# Patient Record
Sex: FEMALE | Race: WHITE | Employment: OTHER | URBAN - METROPOLITAN AREA
[De-identification: names, ages, dates, MRNs, and addresses within clinical notes are randomized per-mention and may not be internally consistent; named-entity substitution may affect disease eponyms.]

---

## 2017-02-08 ENCOUNTER — TRANSFERRED RECORDS (OUTPATIENT)
Dept: HEALTH INFORMATION MANAGEMENT | Facility: CLINIC | Age: 42
End: 2017-02-08

## 2017-02-10 ENCOUNTER — TRANSFERRED RECORDS (OUTPATIENT)
Dept: HEALTH INFORMATION MANAGEMENT | Facility: CLINIC | Age: 42
End: 2017-02-10

## 2017-02-22 ENCOUNTER — OFFICE VISIT (OUTPATIENT)
Dept: NEUROSURGERY | Facility: CLINIC | Age: 42
End: 2017-02-22
Attending: NEUROLOGICAL SURGERY
Payer: MEDICARE

## 2017-02-22 VITALS — WEIGHT: 229.72 LBS | HEIGHT: 65 IN | BODY MASS INDEX: 38.27 KG/M2 | TEMPERATURE: 99 F

## 2017-02-22 DIAGNOSIS — D32.0 BENIGN NEOPLASM OF CEREBRAL MENINGES (H): ICD-10-CM

## 2017-02-22 DIAGNOSIS — D49.6 BRAIN TUMOR (H): Primary | ICD-10-CM

## 2017-02-22 DIAGNOSIS — D32.9 MENINGIOMA (H): Primary | ICD-10-CM

## 2017-02-22 PROCEDURE — 99214 OFFICE O/P EST MOD 30 MIN: CPT | Mod: ZF

## 2017-02-22 RX ORDER — DIPHENHYDRAMINE HCL 25 MG
25 TABLET ORAL EVERY 6 HOURS PRN
Qty: 56 TABLET | Status: CANCELLED | OUTPATIENT
Start: 2017-02-22

## 2017-02-22 RX ORDER — VENLAFAXINE HYDROCHLORIDE 75 MG/1
75 CAPSULE, EXTENDED RELEASE ORAL EVERY MORNING
COMMUNITY

## 2017-02-22 RX ORDER — MEDROXYPROGESTERONE ACETATE 150 MG/ML
150 INJECTION, SUSPENSION INTRAMUSCULAR
COMMUNITY

## 2017-02-22 RX ORDER — ALBUTEROL SULFATE 90 UG/1
2 AEROSOL, METERED RESPIRATORY (INHALATION) 2 TIMES DAILY
COMMUNITY

## 2017-02-22 RX ORDER — GABAPENTIN 100 MG/1
CAPSULE ORAL
COMMUNITY

## 2017-02-22 RX ORDER — METHOCARBAMOL 500 MG/1
TABLET, FILM COATED ORAL 4 TIMES DAILY PRN
Status: ON HOLD | COMMUNITY
End: 2020-01-01

## 2017-02-22 ASSESSMENT — PAIN SCALES - GENERAL: PAINLEVEL: WORST PAIN (10)

## 2017-02-22 NOTE — NURSING NOTE
"Chief Complaint   Patient presents with     Consult     Multiple meningiomas       Initial Temp 99  F (37.2  C) (Oral)  Ht 5' 4.72\" (164.4 cm)  Wt 229 lb 11.5 oz (104.2 kg)  HC 55 cm (21.65\")  BMI 38.55 kg/m2 Estimated body mass index is 38.55 kg/(m^2) as calculated from the following:    Height as of this encounter: 5' 4.72\" (164.4 cm).    Weight as of this encounter: 229 lb 11.5 oz (104.2 kg).  Medication Reconciliation: complete Did take a while to input all meds and allergies.    Lisa Jameson CMA      "

## 2017-02-22 NOTE — PATIENT INSTRUCTIONS
You met with Pediatric Neurosurgery at the [facility]    Dr. Ángel Lau, Dr. Walt Myles, Dr. Ang Bryant,  Lupis Arrington, JEN, Fabi Bautista NP    Pediatric Appointment Scheduling and Call Center [phone]  Sadia Lopez RNCC, [phone]    Clinic Fax Number: [phone]    For Urgent Matters that cannot wait until the next business day, is over a holiday and/or a weekend, please call [phone] and ask to page the Pediatric Neurosurgery Resident on call.

## 2017-02-22 NOTE — MR AVS SNAPSHOT
After Visit Summary   2/22/2017    Chari Coates    MRN: [id number]           Patient Information     Date Of Birth          1975        Visit Information        Provider Department      2/22/2017 2:15 PM Ángel Lau MD Peds Neurosurgery        Today's Diagnoses     Brain tumor (H)    -  1    Benign neoplasm of cerebral meninges (H)           Care Instructions    You met with Pediatric Neurosurgery at the [facility]    Dr. Ángel Lau, Dr. Walt Myles, Dr. Ang Bryant,  Lupis Arrington NP, Fabi Bautista NP    Pediatric Appointment Scheduling and Call Center [phone]  Sadia Lopez CC, [phone]    Clinic Fax Number: [phone]    For Urgent Matters that cannot wait until the next business day, is over a holiday and/or a weekend, please call [phone] and ask to page the Pediatric Neurosurgery Resident on call.          Follow-ups after your visit        Your next 10 appointments already scheduled     Feb 23, 2017 11:00 AM CST   MR HEAD W/O & W CONTRAST ANGIOGRAM with URMR2   [facility], [place], MRI ([facility])    [address] [place] [address] [phone]           Take your medicines as usual, unless your doctor tells you not to. Bring a list of your current medicines to your exam (including vitamins, minerals and over-the-counter drugs).  You will be given intravenous contrast for this exam. To prepare:   The day before your exam, drink extra fluids at least six 8-ounce glasses (unless your doctor tells you to restrict your fluids).   Have a blood test (creatinine test) within 30 days of your exam. Go to your clinic or Diagnostic Imaging Department for this test.  The MRI machine uses a strong magnet. Please wear clothes without metal (snaps, zippers). A sweatsuit works well, or we may give you a hospital gown.  Please remove any body piercings and hair extensions before you  arrive. You will also remove watches, jewelry, hairpins, wallets, dentures, partial dental plates and hearing aids. You may wear contact lenses, and you may be able to wear your rings. We have a safe place to keep your personal items, but it is safer to leave them at home.   **IMPORTANT** THE INSTRUCTIONS BELOW ARE ONLY FOR THOSE PATIENTS WHO HAVE BEEN TOLD THEY WILL RECEIVE SEDATION OR GENERAL ANESTHESIA DURING THEIR MRI PROCEDURE:  IF YOU WILL RECEIVE SEDATION (take medicine to help you relax during your exam):   You must get the medicine from your doctor before you arrive. Bring the medicine to the exam. Do not take it at home.   Arrive one hour early. Bring someone who can take you home after the test. Your medicine will make you sleepy. After the exam, you may not drive, take a bus or take a taxi by yourself.   No eating 8 hours before your exam. You may have clear liquids up until 4 hours before your exam. (Clear liquids include water, clear tea, black coffee and fruit juice without pulp.)  IF YOU WILL RECEIVE ANESTHESIA (be asleep for your exam):   Arrive 1 1/2 hours early. Bring someone who can take you home after the test. You may not drive, take a bus or take a taxi by yourself.   No eating 8 hours before your exam. You may have clear liquids up until 4 hours before your exam. (Clear liquids include water, clear tea, black coffee and fruit juice without pulp.)  Please call the Imaging Department at your exam site with any questions.            Feb 23, 2017 11:45 AM CST   MR LUMBAR SPINE W/O & W CONTRAST with URMR2   [facility], [place], MRI ([facility])    [address] [place] [address] [phone]           Take your medicines as usual, unless your doctor tells you not to. Bring a list of your current medicines to your exam (including vitamins, minerals and over-the-counter drugs).  You will be given intravenous contrast for this exam. To  prepare:   The day before your exam, drink extra fluids at least six 8-ounce glasses (unless your doctor tells you to restrict your fluids).   Have a blood test (creatinine test) within 30 days of your exam. Go to your clinic or Diagnostic Imaging Department for this test.  The MRI machine uses a strong magnet. Please wear clothes without metal (snaps, zippers). A sweatsuit works well, or we may give you a hospital gown.  Please remove any body piercings and hair extensions before you arrive. You will also remove watches, jewelry, hairpins, wallets, dentures, partial dental plates and hearing aids. You may wear contact lenses, and you may be able to wear your rings. We have a safe place to keep your personal items, but it is safer to leave them at home.   **IMPORTANT** THE INSTRUCTIONS BELOW ARE ONLY FOR THOSE PATIENTS WHO HAVE BEEN TOLD THEY WILL RECEIVE SEDATION OR GENERAL ANESTHESIA DURING THEIR MRI PROCEDURE:  IF YOU WILL RECEIVE SEDATION (take medicine to help you relax during your exam):   You must get the medicine from your doctor before you arrive. Bring the medicine to the exam. Do not take it at home.   Arrive one hour early. Bring someone who can take you home after the test. Your medicine will make you sleepy. After the exam, you may not drive, take a bus or take a taxi by yourself.   No eating 8 hours before your exam. You may have clear liquids up until 4 hours before your exam. (Clear liquids include water, clear tea, black coffee and fruit juice without pulp.)  IF YOU WILL RECEIVE ANESTHESIA (be asleep for your exam):   Arrive 1 1/2 hours early. Bring someone who can take you home after the test. You may not drive, take a bus or take a taxi by yourself.   No eating 8 hours before your exam. You may have clear liquids up until 4 hours before your exam. (Clear liquids include water, clear tea, black coffee and fruit juice without pulp.)  Please call the Imaging Department at your exam site with any  questions.            Feb 23, 2017 12:30 PM CST   MR CERVICAL SPINE W/O & W CONTRAST with URMR2   [facility], [place], MRI ([facility])    [facility] [address] [phone]           Take your medicines as usual, unless your doctor tells you not to. Bring a list of your current medicines to your exam (including vitamins, minerals and over-the-counter drugs).  You will be given intravenous contrast for this exam. To prepare:   The day before your exam, drink extra fluids at least six 8-ounce glasses (unless your doctor tells you to restrict your fluids).   Have a blood test (creatinine test) within 30 days of your exam. Go to your clinic or Diagnostic Imaging Department for this test.  The MRI machine uses a strong magnet. Please wear clothes without metal (snaps, zippers). A sweatsuit works well, or we may give you a hospital gown.  Please remove any body piercings and hair extensions before you arrive. You will also remove watches, jewelry, hairpins, wallets, dentures, partial dental plates and hearing aids. You may wear contact lenses, and you may be able to wear your rings. We have a safe place to keep your personal items, but it is safer to leave them at home.   **IMPORTANT** THE INSTRUCTIONS BELOW ARE ONLY FOR THOSE PATIENTS WHO HAVE BEEN TOLD THEY WILL RECEIVE SEDATION OR GENERAL ANESTHESIA DURING THEIR MRI PROCEDURE:  IF YOU WILL RECEIVE SEDATION (take medicine to help you relax during your exam):   You must get the medicine from your doctor before you arrive. Bring the medicine to the exam. Do not take it at home.   Arrive one hour early. Bring someone who can take you home after the test. Your medicine will make you sleepy. After the exam, you may not drive, take a bus or take a taxi by yourself.   No eating 8 hours before your exam. You may have clear liquids up until 4 hours before your exam. (Clear liquids include water, clear tea,  black coffee and fruit juice without pulp.)  IF YOU WILL RECEIVE ANESTHESIA (be asleep for your exam):   Arrive 1 1/2 hours early. Bring someone who can take you home after the test. You may not drive, take a bus or take a taxi by yourself.   No eating 8 hours before your exam. You may have clear liquids up until 4 hours before your exam. (Clear liquids include water, clear tea, black coffee and fruit juice without pulp.)  Please call the Imaging Department at your exam site with any questions.            Feb 23, 2017  2:30 PM CST   MR THORACIC SPINE W/O & W CONTRAST with URMR2   [facility], [place], MRI ([facility] [place] [facility])    [facility] [address] [phone]           Take your medicines as usual, unless your doctor tells you not to. Bring a list of your current medicines to your exam (including vitamins, minerals and over-the-counter drugs).  You will be given intravenous contrast for this exam. To prepare:   The day before your exam, drink extra fluids at least six 8-ounce glasses (unless your doctor tells you to restrict your fluids).   Have a blood test (creatinine test) within 30 days of your exam. Go to your clinic or Diagnostic Imaging Department for this test.  The MRI machine uses a strong magnet. Please wear clothes without metal (snaps, zippers). A sweatsuit works well, or we may give you a hospital gown.  Please remove any body piercings and hair extensions before you arrive. You will also remove watches, jewelry, hairpins, wallets, dentures, partial dental plates and hearing aids. You may wear contact lenses, and you may be able to wear your rings. We have a safe place to keep your personal items, but it is safer to leave them at home.   **IMPORTANT** THE INSTRUCTIONS BELOW ARE ONLY FOR THOSE PATIENTS WHO HAVE BEEN TOLD THEY WILL RECEIVE SEDATION OR GENERAL ANESTHESIA DURING THEIR MRI PROCEDURE:  IF YOU WILL RECEIVE SEDATION (take  medicine to help you relax during your exam):   You must get the medicine from your doctor before you arrive. Bring the medicine to the exam. Do not take it at home.   Arrive one hour early. Bring someone who can take you home after the test. Your medicine will make you sleepy. After the exam, you may not drive, take a bus or take a taxi by yourself.   No eating 8 hours before your exam. You may have clear liquids up until 4 hours before your exam. (Clear liquids include water, clear tea, black coffee and fruit juice without pulp.)  IF YOU WILL RECEIVE ANESTHESIA (be asleep for your exam):   Arrive 1 1/2 hours early. Bring someone who can take you home after the test. You may not drive, take a bus or take a taxi by yourself.   No eating 8 hours before your exam. You may have clear liquids up until 4 hours before your exam. (Clear liquids include water, clear tea, black coffee and fruit juice without pulp.)  Please call the Imaging Department at your exam site with any questions.              Future tests that were ordered for you today     Open Future Orders        Priority Expected Expires Ordered    MRI Lumbar spine with & without gadolinium [KQY047] Routine  2/22/2018 2/22/2017    MRI Cervical spine with & without gadolinium [YZB699] Routine  2/22/2018 2/22/2017    MRI Thoracic spine with & without gadolinium [SEW772] Routine  2/22/2018 2/22/2017    MRI Angiogram head with & without contrast [LKC064] Routine  2/22/2018 2/22/2017    CT Head with & without contrast [VKB640] Routine  2/22/2018 2/22/2017            Who to contact     Please call your clinic at [phone] to:    Ask questions about your health    Make or cancel appointments    Discuss your medicines    Learn about your test results    Speak to your doctor   If you have compliments or concerns about an experience at your clinic, or if you wish to file a complaint, please contact [facility] Patient Relations at [phone] or  "email us at [email or web address]         Additional Information About Your Visit        [employer] Information     [email or web address] is an electronic gateway that provides easy, online access to your medical records. With [email or web address], you can request a clinic appointment, read your test results, renew a prescription or communicate with your care team.     To sign up for [email or web address] visit the website at [email or web address]   You will be asked to enter the access code listed below, as well as some personal information. Please follow the directions to create your username and password.     Your access code is: [email or web address]  Expires: 2017  4:43 PM     Your access code will  in 90 days. If you need help or a new code, please contact your [facility] or call [phone] for assistance.        Care EveryWhere ID     This is your Care EveryWhere ID. This could be used by other organizations to access your [facility] medical records  [id number]        Your Vitals Were     Temperature Height Head Circumference BMI (Body Mass Index)          99  F (37.2  C) (Oral) 1.644 m (5' 4.72\") 55 cm (21.65\") 38.55 kg/m2         Blood Pressure from Last 3 Encounters:   No data found for BP    Weight from Last 3 Encounters:   17 104.2 kg (229 lb 11.5 oz)               Primary Care Provider Office Phone # Fax #    Zack Spanglerford [phone]       [facility] [address] [place] [address]        Thank you!     Thank you for choosing PEDS NEUROSURGERY  for your care. Our goal is always to provide you with excellent care. Hearing back from our patients is one way we can continue to improve our services. Please take a few minutes to complete the written survey that you may receive in the mail after your visit with us. Thank you!             Your Updated Medication List - Protect others around you: Learn how to safely use, store and throw away your medicines at " www.disposemymeds.org.          This list is accurate as of: 2/22/17  4:44 PM.  Always use your most recent med list.                   Brand Name Dispense Instructions for use    albuterol 108 (90 BASE) MCG/ACT Inhaler    PROAIR HFA/PROVENTIL HFA/VENTOLIN HFA     Inhale 2 puffs into the lungs every 6 hours       DEPO-PROVERA 150 MG/ML injection   Generic drug:  medroxyPROGESTERone      Inject 150 mg into the muscle every 3 months       GABAPENTIN PO      Take 300 mg by mouth At Bedtime       LINZESS PO      Take 145 mcg by mouth every morning (before breakfast)       methocarbamol 500 MG tablet    ROBAXIN     Take by mouth 4 times daily as needed for muscle spasms       SYMBICORT IN          venlafaxine 75 MG 24 hr capsule    EFFEXOR-XR     Take by mouth daily       VITAMIN D PO      Take 50,000 Units by mouth twice a week       ZOFRAN PO      Pt. Unaware of strength-- New Med

## 2017-02-22 NOTE — LETTER
2/22/2017      RE: Chari Coates  [address]  [facility] [address]       February 22, 2017      Elías Christian M.D.   [facility]   [address] [place] [address]       RE:  Chari Coates      Dear Dr. Christian:      It was our pleasure to see Chari along with her father and brother at Pediatric Neurosurgery Clinic at the [facility] in [place], [place].      Briefly, Chari is a 41-year-old lady who had a history of ALL status post chemo and radiation at the age of 5-6 years old.  She was doing great until 2 years ago when she started to have headache.  Initially, she was treated for sinus headache.  However, her headache got worse.  Further workup then revealed multiple enhancing extra-axial lesions.  She was then referred to our Neurosurgery Clinic for further evaluation and management.      She has constant headache mostly in the occipital region.  Its intensity and frequency have increased in the last couple of years, associated with nausea, but no vomiting.  She does get intermittent blurry vision.  According to her brother, she has a left visual field deficit and she gets easily fatigued as well.  She also complains of right hand numbness and tingling sensation on and off, which has been going on for 2-3 years.  No symptoms in the left upper extremity.  No symptoms in bilateral lower extremities.  She has no bowel or bladder dysfunction.  No seizure or no stroke in the past.  She has no other complaints at this point.      PAST MEDICAL HISTORY:  GERD, asthma, acute lymphocytic leukemia, depression     PAST SURGICAL HISTORY:  She has a left foot toe surgery, unclear exact nature of the surgery. Laparoscopic Nissen fundoplication.      MEDICATIONS:  She is on Zofran for nausea, Robaxin 500 mg for muscle spasms.  She is on Depo shot, medroxyprogesterone injections every 3 months.  She is on Effexor 75 for depression.  She is on albuterol and Symbicort  inhaler for asthma.  She is on gabapentin for a left foot spasm and pain which she had since the left foot surgery.      SOCIAL HISTORY:  She lives alone.  She has a lot of family support - her brother and father.  She lives in [place], [place].      FAMILY HISTORY:  Negative for brain tumors.      REVIEW OF SYSTEMS:  Sore throat.  See HPI.  Negative except mentioned above.      PHYSICAL EXAMINATION:  She is a pleasant lady.  Awake, alert, oriented x3.  Her pupils are equal, round, reactive to light.  Extraocular movements intact.  Her visual field testing shows that she has a full visual field on the left eye; however, on the right eye, she does have a left-sided visual field deficit.  Face is asymmetric with left-side slightly down, however, she was able to give a symmetric smile and was able to raise eyebrows on both sides.  Facial sensation is intact to light touch bilaterally.  Tongue midline.  No drift.  Motor strength is 5/5 bilateral upper and lower extremities.  Sensation is intact to light touch.  Reflexes +1 biceps, brachioradialis, patellar bilaterally and symmetric.  No clonus bilaterally.  Downgoing toes bilaterally.      Brain MRI from 02/2017 shows multiple extra-axial enhancing lesions.  Biggest one in the right occipital area, along the right side of the tentorium and the falx, pushing into nearby brain parenchymal tissue.     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      PLAN:  We have spent significant time with her and her family discussing possible surgical interventions.  All of their questions have been answered.  The family would like to proceed with the surgery - resection of the large lesion in the right parietal, occipital area.  Prior to the surgery, we would like to get MRA and MRV to evaluate the vasculature around the tumor.  Unfortunately, patient cannot get CT angiogram due to her allergy to IV dye.  We also want to get a formal visual field testing  before the surgery.    We will get MRI spine with and without contrast, given that she has radiation treatment to her spine in the past.      Thank you for involving us in the care of this patient.        cc:  Zack Nicholas M.D.         [address]  [address]         ÁNGEL MORALES MD       As dictated by VLAD HOWARD MD            D: 2017 17:04   T: 2017 08:13   MT: SAI      Name:     CHARI VERDUGO   MRN:      [id number]        Account:      [id number]   :      1975           Service Date: 2017      Document: [id number]        I, Ángel Morales MD, saw and evaluated Chari Verdugo as part of a shared visit.  I have reviewed and discussed with the resident their history, physical and plan.    I personally reviewed the vital signs, medications, labs and imaging .    My key history or physical exam findings: As described, progressive headaches with blurry vision and evidence of flied cut on confrontational exam. She has multiple radiation induced meningiomas with one large one arising from tentorium/falx extending into the right trigone. There are PCA and tentorial feeders. Other meningiomas much smaller.     Key management decisions made by me: Plan to obtain spine MRI to rule out spine meningiomas as she did receive CS therapy, will obtain formal visual field testing and schedule her for craniotomy and resection of the largest meningioma. We discussed that she may require more surgery in her lifetime and the other tumors will need to be followed. We discussed that this is a risky surgery with risks being possibility of not getting the entire tumor out, and damage to venous drainage which can lead to blood loss and venous stroke with deficits. She understands and wishes to proceed with surgery which we will try to get done soon.     Ángel Morales MD  2017

## 2017-02-23 ENCOUNTER — HOSPITAL ENCOUNTER (OUTPATIENT)
Dept: MRI IMAGING | Facility: CLINIC | Age: 42
End: 2017-02-23
Attending: NEUROLOGICAL SURGERY
Payer: MEDICARE

## 2017-02-23 ENCOUNTER — HOSPITAL ENCOUNTER (OUTPATIENT)
Dept: MRI IMAGING | Facility: CLINIC | Age: 42
Discharge: HOME | End: 2017-02-23
Attending: NEUROLOGICAL SURGERY | Admitting: NEUROLOGICAL SURGERY
Payer: MEDICARE

## 2017-02-23 DIAGNOSIS — D32.0 BENIGN NEOPLASM OF CEREBRAL MENINGES (H): ICD-10-CM

## 2017-02-23 DIAGNOSIS — D49.6 BRAIN TUMOR (H): ICD-10-CM

## 2017-02-23 PROCEDURE — 72156 MRI NECK SPINE W/O & W/DYE: CPT

## 2017-02-23 PROCEDURE — 70553 MRI BRAIN STEM W/O & W/DYE: CPT

## 2017-02-23 PROCEDURE — 25000128 H RX IP 250 OP 636: Performed by: NEUROLOGICAL SURGERY

## 2017-02-23 PROCEDURE — 70546 MR ANGIOGRAPH HEAD W/O&W/DYE: CPT

## 2017-02-23 PROCEDURE — 25500064 ZZH RX 255 OP 636: Performed by: NEUROLOGICAL SURGERY

## 2017-02-23 PROCEDURE — A9585 GADOBUTROL INJECTION: HCPCS | Performed by: NEUROLOGICAL SURGERY

## 2017-02-23 PROCEDURE — 72158 MRI LUMBAR SPINE W/O & W/DYE: CPT

## 2017-02-23 PROCEDURE — 72157 MRI CHEST SPINE W/O & W/DYE: CPT

## 2017-02-23 RX ORDER — GADOBUTROL 604.72 MG/ML
10 INJECTION INTRAVENOUS ONCE
Status: COMPLETED | OUTPATIENT
Start: 2017-02-23 | End: 2017-02-23

## 2017-02-23 RX ADMIN — SODIUM CHLORIDE 100 ML: 9 INJECTION, SOLUTION INTRAVENOUS at 19:31

## 2017-02-23 RX ADMIN — GADOBUTROL 10 ML: 604.72 INJECTION INTRAVENOUS at 19:30

## 2017-02-24 NOTE — PROGRESS NOTES
February 22, 2017      Elías Christian M.D.   [facility]   [address] [place] [address]       RE:  Chari Coates      Dear Dr. Christian:      It was our pleasure to see Chari along with her father and brother at Pediatric Neurosurgery Clinic at the [facility] in [place], [place].      Briefly, Chari is a 41-year-old lady who had a history of ALL status post chemo and radiation at the age of 5-6 years old.  She was doing great until 2 years ago when she started to have headache.  Initially, she was treated for sinus headache.  However, her headache got worse.  Further workup then revealed multiple enhancing extra-axial lesions.  She was then referred to our Neurosurgery Clinic for further evaluation and management.      She has constant headache mostly in the occipital region.  Its intensity and frequency have increased in the last couple of years, associated with nausea, but no vomiting.  She does get intermittent blurry vision.  According to her brother, she has a left visual field deficit and she gets easily fatigued as well.  She also complains of right hand numbness and tingling sensation on and off, which has been going on for 2-3 years.  No symptoms in the left upper extremity.  No symptoms in bilateral lower extremities.  She has no bowel or bladder dysfunction.  No seizure or no stroke in the past.  She has no other complaints at this point.      PAST MEDICAL HISTORY:  GERD, asthma, acute lymphocytic leukemia, depression     PAST SURGICAL HISTORY:  She has a left foot toe surgery, unclear exact nature of the surgery. Laparoscopic Nissen fundoplication.      MEDICATIONS:  She is on Zofran for nausea, Robaxin 500 mg for muscle spasms.  She is on Depo shot, medroxyprogesterone injections every 3 months.  She is on Effexor 75 for depression.  She is on albuterol and Symbicort inhaler for asthma.  She is on gabapentin for a left foot spasm and pain which she  had since the left foot surgery.      SOCIAL HISTORY:  She lives alone.  She has a lot of family support - her brother and father.  She lives in [place], [place].      FAMILY HISTORY:  Negative for brain tumors.      REVIEW OF SYSTEMS:  Sore throat.  See HPI.  Negative except mentioned above.      PHYSICAL EXAMINATION:  She is a pleasant lady.  Awake, alert, oriented x3.  Her pupils are equal, round, reactive to light.  Extraocular movements intact.  Her visual field testing shows that she has a full visual field on the left eye; however, on the right eye, she does have a left-sided visual field deficit.  Face is asymmetric with left-side slightly down, however, she was able to give a symmetric smile and was able to raise eyebrows on both sides.  Facial sensation is intact to light touch bilaterally.  Tongue midline.  No drift.  Motor strength is 5/5 bilateral upper and lower extremities.  Sensation is intact to light touch.  Reflexes +1 biceps, brachioradialis, patellar bilaterally and symmetric.  No clonus bilaterally.  Downgoing toes bilaterally.      Brain MRI from 02/2017 shows multiple extra-axial enhancing lesions.  Biggest one in the right occipital area, along the right side of the tentorium and the falx, pushing into nearby brain parenchymal tissue.     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      PLAN:  We have spent significant time with her and her family discussing possible surgical interventions.  All of their questions have been answered.  The family would like to proceed with the surgery - resection of the large lesion in the right parietal, occipital area.  Prior to the surgery, we would like to get MRA and MRV to evaluate the vasculature around the tumor.  Unfortunately, patient cannot get CT angiogram due to her allergy to IV dye.  We also want to get a formal visual field testing before the surgery.    We will get MRI spine with and without contrast, given that she  has radiation treatment to her spine in the past.      Thank you for involving us in the care of this patient.        cc:  Zack Nicholas M.D.         [address]  [address]         ÁNGEL MORALES MD       As dictated by VLAD HOWARD MD            D: 2017 17:04   T: 2017 08:13   MT: JV      Name:     CHARI VERDUGO   MRN:      [id number]        Account:      [id number]   :      1975           Service Date: 2017      Document: [id number]        I, Ángel Morales MD, saw and evaluated Chari Verdugo as part of a shared visit.  I have reviewed and discussed with the resident their history, physical and plan.    I personally reviewed the vital signs, medications, labs and imaging .    My key history or physical exam findings: As described, progressive headaches with blurry vision and evidence of flied cut on confrontational exam. She has multiple radiation induced meningiomas with one large one arising from tentorium/falx extending into the right trigone. There are PCA and tentorial feeders. Other meningiomas much smaller.     Key management decisions made by me: Plan to obtain spine MRI to rule out spine meningiomas as she did receive CS therapy, will obtain formal visual field testing and schedule her for craniotomy and resection of the largest meningioma. We discussed that she may require more surgery in her lifetime and the other tumors will need to be followed. We discussed that this is a risky surgery with risks being possibility of not getting the entire tumor out, and damage to venous drainage which can lead to blood loss and venous stroke with deficits. She understands and wishes to proceed with surgery which we will try to get done soon.     Ángel Morales MD  2017

## 2017-02-28 DIAGNOSIS — D32.0 CEREBRAL MENINGIOMA (H): Primary | ICD-10-CM

## 2017-03-06 ENCOUNTER — TRANSFERRED RECORDS (OUTPATIENT)
Dept: HEALTH INFORMATION MANAGEMENT | Facility: CLINIC | Age: 42
End: 2017-03-06

## 2017-03-27 ENCOUNTER — ANESTHESIA EVENT (OUTPATIENT)
Dept: SURGERY | Facility: CLINIC | Age: 42
DRG: 025 | End: 2017-03-27
Payer: MEDICARE

## 2017-03-29 ENCOUNTER — OFFICE VISIT (OUTPATIENT)
Dept: SURGERY | Facility: CLINIC | Age: 42
End: 2017-03-29

## 2017-03-29 ENCOUNTER — ALLIED HEALTH/NURSE VISIT (OUTPATIENT)
Dept: SURGERY | Facility: CLINIC | Age: 42
End: 2017-03-29

## 2017-03-29 VITALS
TEMPERATURE: 98.7 F | SYSTOLIC BLOOD PRESSURE: 134 MMHG | HEART RATE: 81 BPM | DIASTOLIC BLOOD PRESSURE: 80 MMHG | BODY MASS INDEX: 39.25 KG/M2 | HEIGHT: 65 IN | OXYGEN SATURATION: 96 % | RESPIRATION RATE: 14 BRPM | WEIGHT: 235.6 LBS

## 2017-03-29 DIAGNOSIS — G93.9 LESION OF LEFT PARIETAL LOBE OF BRAIN: ICD-10-CM

## 2017-03-29 DIAGNOSIS — G93.9 LESION OF LEFT PARIETAL LOBE OF BRAIN: Primary | ICD-10-CM

## 2017-03-29 LAB
APTT PPP: 27 SEC (ref 22–37)
INR PPP: 0.96 (ref 0.86–1.14)

## 2017-03-29 RX ORDER — DIPHENHYDRAMINE HCL 25 MG
CAPSULE ORAL PRN
COMMUNITY

## 2017-03-29 NOTE — ANESTHESIA PREPROCEDURE EVALUATION
Anesthesia Evaluation     . Pt has had prior anesthetic. Type: General and MAC (slow awakening; nausea delayed discharge)    History of anesthetic complications   - PONV        ROS/MED HX    ENT/Pulmonary:     (+)allergic rhinitis, Mild Persistent asthma Last exacerbation: years,Treatment: Inhaled steroids and Inhaler daily,  , . .    Neurologic: Comment: Fibromyalgia  Chronic back and neck pain - neg neurologic ROS     Cardiovascular:         METS/Exercise Tolerance: Comment: Can get wheezy with stairs >4 METS   Hematologic:     (+) History of Transfusion no previous transfusion reaction -      Musculoskeletal:   (+) arthritis (rheumatoid arthritis), , , other musculoskeletal- hx rheumatoid arthritis; no biologics or methotrxate      GI/Hepatic:     (+) GERD       Renal/Genitourinary:  - ROS Renal section negative       Endo:     (+) Obesity, .      Psychiatric:     (+) psychiatric history depression      Infectious Disease:   (+) Other Infectious Disease previous UTI treated with ciprofloxacin      Malignancy:   (+) Malignancy History of Neuro  Neuro CA Active status post.          Other:    (+) H/O Chronic Pain,                   Physical Exam  Normal systems: dental    Airway   Mallampati: III  TM distance: <3 FB  Neck ROM: full    Dental     Cardiovascular   Rhythm and rate: regular and normal      Pulmonary    breath sounds clear to auscultation    Other findings:   LABS scanned in from OSH: CBC and CMP normal  Type and screen sent today    Procedure: Procedure(s):  Stealth Assisted Craniotomy, Meningioma Resection - Wound Class:     HPI: Chari Coates is a 41 year old female with PMHx significant for fibromyalgia, rheumatoid arthritis, GERD, depression, obesity, chronic neck and back pain, and ALL as a child, presents for resection of multiple meningiomas.    PMHx/PSHx:  No past medical history on file.    No past surgical history on file.      No current facility-administered medications on file prior to  "encounter.   Current Outpatient Prescriptions on File Prior to Encounter:  Cholecalciferol (VITAMIN D PO) Take 50,000 Units by mouth twice a week   Ondansetron HCl (ZOFRAN PO) Pt. Unaware of strength-- New Med   methocarbamol (ROBAXIN) 500 MG tablet Take by mouth 4 times daily as needed for muscle spasms   Linaclotide (LINZESS PO) Take 145 mcg by mouth every morning (before breakfast)   medroxyPROGESTERone (DEPO-PROVERA) 150 MG/ML injection Inject 150 mg into the muscle every 3 months   venlafaxine (EFFEXOR-XR) 75 MG 24 hr capsule Take by mouth daily   Budesonide-Formoterol Fumarate (SYMBICORT IN)    albuterol (PROAIR HFA/PROVENTIL HFA/VENTOLIN HFA) 108 (90 BASE) MCG/ACT Inhaler Inhale 2 puffs into the lungs every 6 hours   GABAPENTIN PO Take 300 mg by mouth At Bedtime       Social Hx:   Social History   Substance Use Topics     Smoking status: Never Smoker     Smokeless tobacco: Not on file     Alcohol use Not on file       Allergies:   Allergies   Allergen Reactions     Augmentin      Azithromycin      Bactrim [Sulfamethoxazole W/Trimethoprim]      Cefdinir      Cortisone      Erythromycin      Flonase [Fluticasone]      Iodine      IV     Methylprednisolone      Penicillins      \"Flushed\" Per pt.      Tizanidine          NPO Status: Per ASA Guidelines    Labs:    Blood Bank:  No results found for: ABO, RH, AS  BMP:  No results for input(s): NA, POTASSIUM, CHLORIDE, CO2, BUN, CR, GLC, AUREA in the last 76086 hours.  CBC:   No results for input(s): WBC, RBC, HGB, HCT, MCV, MCH, MCHC, RDW, PLT in the last 56158 hours.  Coags:  No results for input(s): INR, PTT, FIBR in the last 75044 hours.      IMAGING  MR BRAIN W/O & W CONTRAST 2/23/2017 7:31 PM      Findings:  There are multiple T1 isointense, T2 hyperintense avidly enhancing  dural-based masses as follows:     4.5 x 4.2 x 5.0 cm bilobed lesion (series 11 image 13) with dural tail  extending along the right lateral surface of the posterior cerebral  falx and " right-sided leaflet of the tentorium cerebelli exerts mass  effect upon the right parietal occipital lobe and compresses the  posterior horn and trigone of the right lateral ventricle. There is  associated dilation of the temporal horn of the right lateral  ventricle. There is associated vasogenic edema within the right  parieto-occipital lobe subcortical white matter. It is unclear if this  represents one or 2 lesions as there is a thickened septa superiorly   the 2 lobes but the lesion appears more contiguous  inferiorly along the tentorium. There are multiple foci of  intratumoral T1 hyperintensity corresponding to increased attenuation  on CT, compatible with intratumoral hemorrhage.     1.7 x 0.8 x 0.7 cm lesion overlying the anterior left frontal lobe  (series 11 image 18) with mild mass effect upon the left frontal lobe  but no associated vasogenic edema.     0.8 x 0.7 x 0.8 cm lesion overlying the anterior left frontal lobe  adjacent to the falx (series 11 image 16) with minimal mass effect on  the underlying frontal lobe but no associated vasogenic edema.     1.8 x 2.0 x 2.1 cm parasagittal lesion overlying the left parietal  lobe (series 11 image 19) with associated blooming artifact  corresponding to foci of calcification on head CT. Mild mass effect on  the underlying parietal lobe without associated vasogenic edema.     0.6 x 0.7 x 0.5 cm parasagittal lesion along the right lateral aspect  of the posterior cerebral falx (series 11 image 17) with no associated  mass effect or vasogenic edema.     4 mm rounded lesion located in the posterior superior fundus of the  internal auditory canal, without a clear dural tail.     Ascites from the evidence of intratumoral hemorrhage involving the  largest lesion, there is no other evidence of intracranial hemorrhage.  The cerebellar tonsils extend 1 mm inferior to the foramen magnum. No  extra-axial fluid collection. The ventricles are proportionate to  the  cerebral sulci.     No definite abnormality of the skull marrow signal is noted. The major  vascular intracranial flow-voids are present. The visualized portions  of paranasal sinuses, and mastoid air cells are relatively clear. The  orbits are grossly unremarkable.         Impression:  1. There are 5 intracranial dural-based masses as described above with  imaging characteristics compatible with multiple meningiomas. The  largest of these masses arises from the right posterior cerebral falx  and right leaflet of the tentorium cerebelli and demonstrates mass  effect on the right parietal-occipital lobe and is the only lesion  that is associated with associated vasogenic edema and intratumoral  hemorrhage. This lesion also compresses the trigone of the right  lateral ventricle with associated mild dilatation of the temporal horn  of the right lateral ventricle. The other lesions variably demonstrate  mild to no significant mass effect upon the cerebrum.  2. In addition, there is a 4 mm rounded lesion in the left internal  auditory canal. In light of the other multiple meningiomas, this could  represent a small meningioma but by virtue of its location could  represent a schwannoma.     I have personally reviewed the examination and initial interpretation  and I agree with the findings.     CAYLA LEON MD       PAC Discussion and Assessment    ASA Classification: 3  Case is suitable for:   Anesthetic techniques and relevant risks discussed: GA  Invasive monitoring and risk discussed:   Types:   Possibility and Risk of blood transfusion discussed:   NPO instructions given:   Additional anesthetic preparation and risks discussed:   Needs early admission to pre-op area:   Other:     PAC Resident/NP Anesthesia Assessment:  41 year old female for combined optical tacking craniotomy, resection of right parietal lesion and potentially 2 smaller lesions,  with Dr. Ángel Lau, on 3/30/17, in diagnosis and  treatment of  likely meningiomas.   CANCER: Acute lymphocytic leukemia S/P chemoradiation at 5-6 years of age. Recent discovery of brain lesions when sinus CT done for persistent congestion. Some visual changes, no seizures. Ambulation normal.  PULM: Asthma on symbicort and albuterol inhalers. No acute sx. Never hospitalized or on steroids. On exam,   GI: GERD. PUD 2006. S/P Nissen fundoplication. On Zofran for nausea.   Muscles spasms treated with and gabapentin.  No known cardiovascular dx, sx or meds. Exercise tolerance: Treadmill for 45 min at a time,  2 x week. Waves and stairs can be associated with wheezing and she will pretreat with inhaler.   Heavy menses with h/o anemia- on Depoprovera shot x 20 years.   With past GA has had slow awakening which converted a SDS to overnight-? Date. Pt also evaluated by Dr. Jnag. See her recommendations below.        Reviewed and Signed by PAC Mid-Level Provider/Resident  Mid-Level Provider/Resident: Clau Sommers PA-C  Date: 3/29/17  Time: 4:47 pm    Attending Anesthesiologist Anesthesia Assessment:  41 year old for optical tracking craniotomy, excision of presumed meningioma(s). Chart reviewed, patient seen and evaluated; agree with above assessment.    Patient is appropriate for the planned procedure without further workup or medical management change. The final anesthesia plan will be determined by the physician anesthesiologist caring for the patient on the day of surgery.      Reviewed and Signed by PAC Anesthesiologist  Anesthesiologist: PEREZ  Date: 3/29/2017  Time:   Pass/Fail: Pass  Disposition:     PAC Pharmacist Assessment:        Pharmacist:   Date:   Time:      Anesthesia Plan      History & Physical Review  History and physical reviewed and following examination; no interval change.    ASA Status:  3 .        Plan for General, RSI and ETT with Intravenous induction. Maintenance will be Balanced.    PONV prophylaxis:  Ondansetron (or other  5HT-3)  Additional equipment: 2nd IV and Arterial Line      Postoperative Care      Consents            Nesha Nguyen MD  CA-2/PGY-3  3/29/2017  3:04 PM                      .

## 2017-03-29 NOTE — MR AVS SNAPSHOT
After Visit Summary   3/29/2017    Chari Coates    MRN: [id number]           Patient Information     Date Of Birth          1975        Visit Information        Provider Department      3/29/2017 4:00 PM 6, [facility]        Care Instructions    Preparing for Your Surgery      Name:  Chari Coates   MRN:  [id number]   :  1975   Today's Date:  3/29/2017     Arriving for surgery:  Surgery date:  3/30/17  Surgery time:  7:30 am  Arrival time:  5:00 am (CT @ 6:00 am)  Please come to:       [facility] [address]  [address] [place]  [address]    -   parking is available in front of the hospital from 5:15 am to 8:00 pm    -  Stop at the Information Desk in the lobby    -   Inform the information person that you are here for surgery. An escort to 3c will be provided. If you would not like an escort, please proceed to 3C on the 3rd floor. [phone]     What can I eat or drink?  -  You may have solid food or milk products until 8 hours prior to your surgery. (10:00 pm)  -  You may have water, apple juice or 7up/Sprite until 2 hours prior to your surgery. (5:00 am)    Which medicines can I take?  -  Do NOT take aspirin or Ibuprofen      -  Please take these medications the day of surgery: inhalers (bring to hospital), Effexor      How do I prepare myself?  -  Take two showers: one the night before surgery; and one the morning of surgery.         Use Scrubcare or Hibiclens to wash from neck down.  You may use your own shampoo and conditioner. No other hair products.   -  Do NOT use lotion, powder, deodorant, or antiperspirant the day of your surgery.  -  Do NOT wear any makeup, fingernail polish or jewelry.   - Do not bring your own medications to the hospital, except for inhalers and eye drops.-  Bring your ID and insurance card.    Questions or Concerns:  If you have questions or concerns, please call  the  Preoperative Assessment Center, Monday-Friday 7AM-7PM:  [phone]                  AFTER YOUR SURGERY  Breathing exercises   Breathing exercises help you recover faster. Take deep breaths and let the air out slowly. This will:     Help you wake up after surgery.    Help prevent complications like pneumonia.  Preventing complications will help you go home sooner.   Nausea and vomiting   You may feel sick to your stomach after surgery; if so, let your nurse know.    Pain control:  After surgery, you may have pain. Our goal is to help you manage your pain. Pain medicine will help you feel comfortable enough to do activities that will help you heal.  These activities may include breathing exercises, walking and physical therapy.   To help your health care team treat your pain we will ask: 1) If you have pain  2) where it is located 3) describe your pain in your words  Methods of pain control include medications given by mouth, vein or by nerve block for some surgeries.  We may give you a pain control pump that will:  1) Deliver the medicine through a tube placed in your vein  2) Control the amount of medicine you receive  3) Allow you to push a button to deliver a dose of pain medicine  Sequential Compression Device (SCD) or Pneumo Boots:  You may need to wear SCD S on your legs or feet. These are wraps connected to a machine that pumps in air and releases it. The repeated pumping helps prevent blood clots from forming.                   Follow-ups after your visit        Your next 10 appointments already scheduled     Mar 29, 2017  4:50 PM CDT   (Arrive by 4:35 PM)   PAC Anesthesia Consult with  Pac Anesthesiologist   [facility] ([facility])    [address] [place] [address]  [address] [facility] [address] [phone]            Mar 29, 2017  5:00 PM CDT   LAB with  LAB   [facility] Lab ([facility])    [address] [place] [address]  [facility] [id number] [phone]           Patient must bring picture ID.  Patient should be prepared to give a urine specimen  Please do not eat 10-12 hours before your appointment if you are coming in fasting for labs on lipids, cholesterol, or glucose (sugar).  Pregnant women should follow their Care Team instructions. Water with medications is okay. Do not drink coffee or other fluids.   If you have concerns about taking  your medications, please ask at office or if scheduling via [employer], send a message by clicking on Secure Messaging, Message Your Care Team.            Mar 30, 2017  6:00 AM CDT   CT HEAD W/O CONTRAST with UUCT1   [facility], [place] ([facility], [facility])    [address] [place] [address] [phone]           Please bring any scans or X-rays taken at other hospitals, if similar tests were done. Also bring a list of your medicines, including vitamins, minerals and over-the-counter drugs. It is safest to leave personal items at home.  Be sure to tell your doctor:   If you have any allergies.   If there s any chance you are pregnant.   If you are breastfeeding.   If you have any special needs.  You do not need to do anything special to prepare.  Please wear loose clothing, such as a sweat suit or jogging clothes. Avoid snaps, zippers and other metal. We may ask you to undress and put on a hospital gown.            Mar 30, 2017  6:30 AM CDT   (Arrive by 6:00 AM)   MR BRAIN W CONTRAST with UUMR2   [facility], [facility] ([facility], [facility])    [address] [facility] [address] [phone]           Take your medicines as usual, unless your doctor tells you not to. Bring a list of your current medicines to your exam (including vitamins, minerals and over-the-counter drugs).  You will be given intravenous contrast for this exam. To prepare:   The day before your exam, drink  extra fluids at least six 8-ounce glasses (unless your doctor tells you to restrict your fluids).   Have a blood test (creatinine test) within 30 days of your exam. Go to your clinic or Diagnostic Imaging Department for this test.  The MRI machine uses a strong magnet. Please wear clothes without metal (snaps, zippers). A sweatsuit works well, or we may give you a hospital gown.  Please remove any body piercings and hair extensions before you arrive. You will also remove watches, jewelry, hairpins, wallets, dentures, partial dental plates and hearing aids. You may wear contact lenses, and you may be able to wear your rings. We have a safe place to keep your personal items, but it is safer to leave them at home.   **IMPORTANT** THE INSTRUCTIONS BELOW ARE ONLY FOR THOSE PATIENTS WHO HAVE BEEN TOLD THEY WILL RECEIVE SEDATION OR GENERAL ANESTHESIA DURING THEIR MRI PROCEDURE:  IF YOU WILL RECEIVE SEDATION (take medicine to help you relax during your exam):   You must get the medicine from your doctor before you arrive. Bring the medicine to the exam. Do not take it at home.   Arrive one hour early. Bring someone who can take you home after the test. Your medicine will make you sleepy. After the exam, you may not drive, take a bus or take a taxi by yourself.   No eating 8 hours before your exam. You may have clear liquids up until 4 hours before your exam. (Clear liquids include water, clear tea, black coffee and fruit juice without pulp.)  IF YOU WILL RECEIVE ANESTHESIA (be asleep for your exam):   Arrive 1 1/2 hours early. Bring someone who can take you home after the test. You may not drive, take a bus or take a taxi by yourself.   No eating 8 hours before your exam. You may have clear liquids up until 4 hours before your exam. (Clear liquids include water, clear tea, black coffee and fruit juice without pulp.)  Please call the Imaging Department at your exam site with any questions.            Mar 30, 2017   Procedure  "with Ángel Lau MD   [facility], [place], Same Day Surgery (--)    [address]  [place] [address] [phone]              Who to contact     Please call your clinic at [phone] to:    Ask questions about your health    Make or cancel appointments    Discuss your medicines    Learn about your test results    Speak to your doctor   If you have compliments or concerns about an experience at your clinic, or if you wish to file a complaint, please contact [facility] Patient Relations at [phone] or email us at [email or web address]         Additional Information About Your Visit        [employer] Information     [employer] is an electronic gateway that provides easy, online access to your medical records. With [employer], you can request a clinic appointment, read your test results, renew a prescription or communicate with your care team.     To sign up for [employer] visit the website at [email or web address]   You will be asked to enter the access code listed below, as well as some personal information. Please follow the directions to create your username and password.     Your access code is: [email or web address]  Expires: 2017  5:43 PM     Your access code will  in 90 days. If you need help or a new code, please contact your [facility] or call [phone] for assistance.        Care EveryWhere ID     This is your Care EveryWhere ID. This could be used by other organizations to access your [place] medical records  [id number]        Your Vitals Were     Pulse Temperature Respirations Height Pulse Oximetry BMI (Body Mass Index)    81 98.7  F (37.1  C) (Oral) 14 1.651 m (5' 5\") 96% 39.21 kg/m2       Blood Pressure from Last 3 Encounters:   17 134/80    Weight from Last 3 Encounters:   17 106.9 kg (235 lb 9.6 oz)   17 104.2 kg (229 lb 11.5 oz)              Today, you had the following     No orders found for " herminia       Primary Care Provider Office Phone # Fax #    Zack Nicholas [phone]       [facility] [address] [address]        Thank you!     Thank you for choosing [facility] PREOPERATIVE ASSESSMENT [place]  for your care. Our goal is always to provide you with excellent care. Hearing back from our patients is one way we can continue to improve our services. Please take a few minutes to complete the written survey that you may receive in the mail after your visit with us. Thank you!             Your Updated Medication List - Protect others around you: Learn how to safely use, store and throw away your medicines at www.disposemymeds.org.          This list is accurate as of: 3/29/17  4:24 PM.  Always use your most recent med list.                   Brand Name Dispense Instructions for use    albuterol 108 (90 BASE) MCG/ACT Inhaler    PROAIR HFA/PROVENTIL HFA/VENTOLIN HFA     Inhale 2 puffs into the lungs 2 times daily       DEPO-PROVERA 150 MG/ML injection   Generic drug:  medroxyPROGESTERone      Inject 150 mg into the muscle every 3 months       diphenhydrAMINE 25 MG capsule    BENADRYL     Take by mouth as needed       GABAPENTIN PO      Take 300 mg by mouth At Bedtime       LINZESS PO      Take 145 mcg by mouth every morning (before breakfast)       methocarbamol 500 MG tablet    ROBAXIN     Take by mouth 4 times daily as needed for muscle spasms       SYMBICORT IN      Inhale into the lungs 2 times daily       venlafaxine 75 MG 24 hr capsule    EFFEXOR-XR     Take 75 mg by mouth every morning       VITAMIN D PO      Take 50,000 Units by mouth twice a week       ZOFRAN PO      Take by mouth as needed Pt. Unaware of strength-- New Med

## 2017-03-29 NOTE — PATIENT INSTRUCTIONS
Preparing for Your Surgery      Name:  Chari Coates   MRN:  [id number]   :  1975   Today's Date:  3/29/2017     Arriving for surgery:  Surgery date:  3/30/17  Surgery time:  7:30 am  Arrival time:  5:00 am (CT @ 6:00 am)  Please come to:       [facility] [address]  [address] [place]  [address]    -   parking is available in front of the hospital from 5:15 am to 8:00 pm    -  Stop at the Information Desk in the lobby    -   Inform the information person that you are here for surgery. An escort to 3c will be provided. If you would not like an escort, please proceed to 3C on the 3rd floor. [phone]     What can I eat or drink?  -  You may have solid food or milk products until 8 hours prior to your surgery. (10:00 pm)  -  You may have water, apple juice or 7up/Sprite until 2 hours prior to your surgery. (5:00 am)    Which medicines can I take?  -  Do NOT take aspirin or Ibuprofen      -  Please take these medications the day of surgery: inhalers (bring to hospital), Effexor      How do I prepare myself?  -  Take two showers: one the night before surgery; and one the morning of surgery.         Use Scrubcare or Hibiclens to wash from neck down.  You may use your own shampoo and conditioner. No other hair products.   -  Do NOT use lotion, powder, deodorant, or antiperspirant the day of your surgery.  -  Do NOT wear any makeup, fingernail polish or jewelry.   - Do not bring your own medications to the hospital, except for inhalers and eye drops.-  Bring your ID and insurance card.    Questions or Concerns:  If you have questions or concerns, please call the  Preoperative Assessment Center, Monday-Friday 7AM-7PM:  [phone]                  AFTER YOUR SURGERY  Breathing exercises   Breathing exercises help you recover faster. Take deep breaths and let the air out slowly. This will:     Help you wake up after surgery.    Help prevent complications like  pneumonia.  Preventing complications will help you go home sooner.   Nausea and vomiting   You may feel sick to your stomach after surgery; if so, let your nurse know.    Pain control:  After surgery, you may have pain. Our goal is to help you manage your pain. Pain medicine will help you feel comfortable enough to do activities that will help you heal.  These activities may include breathing exercises, walking and physical therapy.   To help your health care team treat your pain we will ask: 1) If you have pain  2) where it is located 3) describe your pain in your words  Methods of pain control include medications given by mouth, vein or by nerve block for some surgeries.  We may give you a pain control pump that will:  1) Deliver the medicine through a tube placed in your vein  2) Control the amount of medicine you receive  3) Allow you to push a button to deliver a dose of pain medicine  Sequential Compression Device (SCD) or Pneumo Boots:  You may need to wear SCD S on your legs or feet. These are wraps connected to a machine that pumps in air and releases it. The repeated pumping helps prevent blood clots from forming.

## 2017-03-30 ENCOUNTER — HOSPITAL ENCOUNTER (OUTPATIENT)
Dept: MRI IMAGING | Facility: CLINIC | Age: 42
DRG: 025 | End: 2017-03-30
Attending: NEUROLOGICAL SURGERY | Admitting: NEUROLOGICAL SURGERY
Payer: MEDICARE

## 2017-03-30 ENCOUNTER — HOSPITAL ENCOUNTER (INPATIENT)
Facility: CLINIC | Age: 42
LOS: 5 days | Discharge: HOME | DRG: 025 | End: 2017-04-04
Attending: NEUROLOGICAL SURGERY | Admitting: NEUROLOGICAL SURGERY
Payer: MEDICARE

## 2017-03-30 ENCOUNTER — HOSPITAL ENCOUNTER (OUTPATIENT)
Dept: CT IMAGING | Facility: CLINIC | Age: 42
DRG: 025 | End: 2017-03-30
Attending: NEUROLOGICAL SURGERY | Admitting: NEUROLOGICAL SURGERY
Payer: MEDICARE

## 2017-03-30 ENCOUNTER — ANESTHESIA (OUTPATIENT)
Dept: SURGERY | Facility: CLINIC | Age: 42
DRG: 025 | End: 2017-03-30
Payer: MEDICARE

## 2017-03-30 DIAGNOSIS — D32.0 CEREBRAL MENINGIOMA (H): ICD-10-CM

## 2017-03-30 DIAGNOSIS — Z98.890 S/P CRANIOTOMY: Primary | ICD-10-CM

## 2017-03-30 PROBLEM — D32.9 MENINGIOMA (H): Status: ACTIVE | Noted: 2017-03-30

## 2017-03-30 LAB
ABO + RH BLD: NORMAL
ABO + RH BLD: NORMAL
ANION GAP SERPL CALCULATED.3IONS-SCNC: 8 MMOL/L (ref 3–14)
APTT PPP: 24 SEC (ref 22–37)
APTT PPP: 25 SEC (ref 22–37)
BASE DEFICIT BLDA-SCNC: 4.4 MMOL/L
BASE DEFICIT BLDA-SCNC: 7.1 MMOL/L
BASE DEFICIT BLDV-SCNC: 5.7 MMOL/L
BASE DEFICIT BLDV-SCNC: 6.5 MMOL/L
BLD GP AB SCN SERPL QL: NORMAL
BLD PROD TYP BPU: NORMAL
BLD UNIT ID BPU: 0
BLOOD BANK CMNT PATIENT-IMP: NORMAL
BLOOD BANK CMNT PATIENT-IMP: NORMAL
BLOOD PRODUCT CODE: NORMAL
BPU ID: NORMAL
BUN SERPL-MCNC: 6 MG/DL (ref 7–30)
CA-I BLD-MCNC: 4.2 MG/DL (ref 4.4–5.2)
CA-I BLD-MCNC: 4.8 MG/DL (ref 4.4–5.2)
CA-I BLD-MCNC: 5.3 MG/DL (ref 4.4–5.2)
CA-I BLD-MCNC: 5.7 MG/DL (ref 4.4–5.2)
CALCIUM SERPL-MCNC: 8.3 MG/DL (ref 8.5–10.1)
CHLORIDE SERPL-SCNC: 114 MMOL/L (ref 94–109)
CO2 SERPL-SCNC: 22 MMOL/L (ref 20–32)
CREAT SERPL-MCNC: 0.68 MG/DL (ref 0.52–1.04)
ERYTHROCYTE [DISTWIDTH] IN BLOOD BY AUTOMATED COUNT: 13.9 % (ref 10–15)
ERYTHROCYTE [DISTWIDTH] IN BLOOD BY AUTOMATED COUNT: 14.1 % (ref 10–15)
ERYTHROCYTE [DISTWIDTH] IN BLOOD BY AUTOMATED COUNT: 14.1 % (ref 10–15)
FIBRINOGEN PPP-MCNC: 137 MG/DL (ref 200–420)
GFR SERPL CREATININE-BSD FRML MDRD: ABNORMAL ML/MIN/1.7M2
GLUCOSE BLD-MCNC: 146 MG/DL (ref 70–99)
GLUCOSE BLD-MCNC: 167 MG/DL (ref 70–99)
GLUCOSE BLD-MCNC: 203 MG/DL (ref 70–99)
GLUCOSE BLD-MCNC: 94 MG/DL (ref 70–99)
GLUCOSE BLDC GLUCOMTR-MCNC: 128 MG/DL (ref 70–99)
GLUCOSE BLDC GLUCOMTR-MCNC: 162 MG/DL (ref 70–99)
GLUCOSE SERPL-MCNC: 164 MG/DL (ref 70–99)
HCG UR QL: NEGATIVE
HCO3 BLD-SCNC: 18 MMOL/L (ref 21–28)
HCO3 BLD-SCNC: 21 MMOL/L (ref 21–28)
HCO3 BLDV-SCNC: 19 MMOL/L (ref 21–28)
HCO3 BLDV-SCNC: 20 MMOL/L (ref 21–28)
HCT VFR BLD AUTO: 26.2 % (ref 35–47)
HCT VFR BLD AUTO: 27.8 % (ref 35–47)
HCT VFR BLD AUTO: 30.5 % (ref 35–47)
HGB BLD-MCNC: 11.3 G/DL (ref 11.7–15.7)
HGB BLD-MCNC: 12.4 G/DL (ref 11.7–15.7)
HGB BLD-MCNC: 8.5 G/DL (ref 11.7–15.7)
HGB BLD-MCNC: 8.5 G/DL (ref 11.7–15.7)
HGB BLD-MCNC: 8.7 G/DL (ref 11.7–15.7)
HGB BLD-MCNC: 9 G/DL (ref 11.7–15.7)
HGB BLD-MCNC: 9.2 G/DL (ref 11.7–15.7)
HGB BLD-MCNC: 9.9 G/DL (ref 11.7–15.7)
INR PPP: 0.95 (ref 0.86–1.14)
INR PPP: 1.43 (ref 0.86–1.14)
MAGNESIUM SERPL-MCNC: 1.7 MG/DL (ref 1.6–2.3)
MCH RBC QN AUTO: 28.8 PG (ref 26.5–33)
MCH RBC QN AUTO: 29.6 PG (ref 26.5–33)
MCH RBC QN AUTO: 29.7 PG (ref 26.5–33)
MCHC RBC AUTO-ENTMCNC: 32.4 G/DL (ref 31.5–36.5)
MCHC RBC AUTO-ENTMCNC: 32.4 G/DL (ref 31.5–36.5)
MCHC RBC AUTO-ENTMCNC: 32.5 G/DL (ref 31.5–36.5)
MCV RBC AUTO: 89 FL (ref 78–100)
MCV RBC AUTO: 91 FL (ref 78–100)
MCV RBC AUTO: 92 FL (ref 78–100)
MRSA DNA SPEC QL NAA+PROBE: NORMAL
NUM BPU REQUESTED: 6
O2/TOTAL GAS SETTING VFR VENT: 46 %
O2/TOTAL GAS SETTING VFR VENT: 46 %
O2/TOTAL GAS SETTING VFR VENT: 47 %
O2/TOTAL GAS SETTING VFR VENT: 49 %
OSMOLALITY SERPL: 311 MMOL/KG (ref 275–295)
PCO2 BLD: 32 MM HG (ref 35–45)
PCO2 BLD: 35 MM HG (ref 35–45)
PCO2 BLDV: 36 MM HG (ref 40–50)
PCO2 BLDV: 42 MM HG (ref 40–50)
PH BLD: 7.36 PH (ref 7.35–7.45)
PH BLD: 7.37 PH (ref 7.35–7.45)
PH BLDV: 7.3 PH (ref 7.32–7.43)
PH BLDV: 7.33 PH (ref 7.32–7.43)
PHOSPHATE SERPL-MCNC: 3.5 MG/DL (ref 2.5–4.5)
PLATELET # BLD AUTO: 131 10E9/L (ref 150–450)
PLATELET # BLD AUTO: 156 10E9/L (ref 150–450)
PLATELET # BLD AUTO: 223 10E9/L (ref 150–450)
PO2 BLD: 167 MM HG (ref 80–105)
PO2 BLD: 84 MM HG (ref 80–105)
PO2 BLDV: 157 MM HG (ref 25–47)
PO2 BLDV: 94 MM HG (ref 25–47)
POTASSIUM BLD-SCNC: 3.7 MMOL/L (ref 3.4–5.3)
POTASSIUM BLD-SCNC: 4.2 MMOL/L (ref 3.4–5.3)
POTASSIUM BLD-SCNC: 4.2 MMOL/L (ref 3.4–5.3)
POTASSIUM BLD-SCNC: 4.5 MMOL/L (ref 3.4–5.3)
POTASSIUM SERPL-SCNC: 3.9 MMOL/L (ref 3.4–5.3)
POTASSIUM SERPL-SCNC: 4.8 MMOL/L (ref 3.4–5.3)
RBC # BLD AUTO: 2.95 10E12/L (ref 3.8–5.2)
RBC # BLD AUTO: 3.03 10E12/L (ref 3.8–5.2)
RBC # BLD AUTO: 3.34 10E12/L (ref 3.8–5.2)
SODIUM BLD-SCNC: 132 MMOL/L (ref 133–144)
SODIUM BLD-SCNC: 137 MMOL/L (ref 133–144)
SODIUM BLD-SCNC: 140 MMOL/L (ref 133–144)
SODIUM BLD-SCNC: 142 MMOL/L (ref 133–144)
SODIUM SERPL-SCNC: 144 MMOL/L (ref 133–144)
SPECIMEN EXP DATE BLD: NORMAL
SPECIMEN SOURCE: NORMAL
TRANSFUSION STATUS PATIENT QL: NORMAL
WBC # BLD AUTO: 10.5 10E9/L (ref 4–11)
WBC # BLD AUTO: 11 10E9/L (ref 4–11)
WBC # BLD AUTO: 13 10E9/L (ref 4–11)

## 2017-03-30 PROCEDURE — A9270 NON-COVERED ITEM OR SERVICE: HCPCS | Mod: GY | Performed by: NEUROLOGICAL SURGERY

## 2017-03-30 PROCEDURE — 25000125 ZZHC RX 250: Performed by: ANESTHESIOLOGY

## 2017-03-30 PROCEDURE — 27211024 ZZHC OR SUPPLY OTHER OPNP: Performed by: NEUROLOGICAL SURGERY

## 2017-03-30 PROCEDURE — 37000009 ZZH ANESTHESIA TECHNICAL FEE, EACH ADDTL 15 MIN: Performed by: NEUROLOGICAL SURGERY

## 2017-03-30 PROCEDURE — A9585 GADOBUTROL INJECTION: HCPCS | Performed by: NEUROLOGICAL SURGERY

## 2017-03-30 PROCEDURE — 83930 ASSAY OF BLOOD OSMOLALITY: CPT | Performed by: NEUROLOGICAL SURGERY

## 2017-03-30 PROCEDURE — 36000076 ZZH SURGERY LEVEL 6 EA 15 ADDTL MIN - UMMC: Performed by: NEUROLOGICAL SURGERY

## 2017-03-30 PROCEDURE — 27810169 ZZH OR IMPLANT GENERAL: Performed by: NEUROLOGICAL SURGERY

## 2017-03-30 PROCEDURE — P9041 ALBUMIN (HUMAN),5%, 50ML: HCPCS

## 2017-03-30 PROCEDURE — 25800025 ZZH RX 258

## 2017-03-30 PROCEDURE — 86901 BLOOD TYPING SEROLOGIC RH(D): CPT | Performed by: NEUROLOGICAL SURGERY

## 2017-03-30 PROCEDURE — 84100 ASSAY OF PHOSPHORUS: CPT | Performed by: NEUROLOGICAL SURGERY

## 2017-03-30 PROCEDURE — 25000128 H RX IP 250 OP 636: Performed by: NEUROLOGICAL SURGERY

## 2017-03-30 PROCEDURE — 85730 THROMBOPLASTIN TIME PARTIAL: CPT | Performed by: NEUROLOGICAL SURGERY

## 2017-03-30 PROCEDURE — 70552 MRI BRAIN STEM W/DYE: CPT

## 2017-03-30 PROCEDURE — 25500064 ZZH RX 255 OP 636: Performed by: NEUROLOGICAL SURGERY

## 2017-03-30 PROCEDURE — 25000125 ZZHC RX 250: Performed by: NEUROLOGICAL SURGERY

## 2017-03-30 PROCEDURE — 85610 PROTHROMBIN TIME: CPT | Performed by: NEUROLOGICAL SURGERY

## 2017-03-30 PROCEDURE — 88331 PATH CONSLTJ SURG 1 BLK 1SPC: CPT | Performed by: NEUROLOGICAL SURGERY

## 2017-03-30 PROCEDURE — 88307 TISSUE EXAM BY PATHOLOGIST: CPT | Performed by: NEUROLOGICAL SURGERY

## 2017-03-30 PROCEDURE — 86850 RBC ANTIBODY SCREEN: CPT | Performed by: NEUROLOGICAL SURGERY

## 2017-03-30 PROCEDURE — 8E09XBG COMPUTER ASSISTED PROCEDURE OF HEAD AND NECK REGION, WITH COMPUTERIZED TOMOGRAPHY: ICD-10-PCS | Performed by: NEUROLOGICAL SURGERY

## 2017-03-30 PROCEDURE — 36415 COLL VENOUS BLD VENIPUNCTURE: CPT | Performed by: NEUROLOGICAL SURGERY

## 2017-03-30 PROCEDURE — 00000146 ZZHCL STATISTIC GLUCOSE BY METER IP

## 2017-03-30 PROCEDURE — 40000275 ZZH STATISTIC RCP TIME EA 10 MIN

## 2017-03-30 PROCEDURE — 25000125 ZZHC RX 250

## 2017-03-30 PROCEDURE — 71000015 ZZH RECOVERY PHASE 1 LEVEL 2 EA ADDTL HR: Performed by: NEUROLOGICAL SURGERY

## 2017-03-30 PROCEDURE — 40000170 ZZH STATISTIC PRE-PROCEDURE ASSESSMENT II: Performed by: NEUROLOGICAL SURGERY

## 2017-03-30 PROCEDURE — P9016 RBC LEUKOCYTES REDUCED: HCPCS | Performed by: PHYSICIAN ASSISTANT

## 2017-03-30 PROCEDURE — 80048 BASIC METABOLIC PNL TOTAL CA: CPT | Performed by: NEUROLOGICAL SURGERY

## 2017-03-30 PROCEDURE — 81025 URINE PREGNANCY TEST: CPT | Performed by: NEUROLOGICAL SURGERY

## 2017-03-30 PROCEDURE — 85384 FIBRINOGEN ACTIVITY: CPT | Performed by: NEUROLOGICAL SURGERY

## 2017-03-30 PROCEDURE — S0077 INJECTION, CLINDAMYCIN PHOSP: HCPCS | Performed by: NEUROLOGICAL SURGERY

## 2017-03-30 PROCEDURE — 25000132 ZZH RX MED GY IP 250 OP 250 PS 637: Mod: GY | Performed by: NEUROLOGICAL SURGERY

## 2017-03-30 PROCEDURE — 00B20ZX EXCISION OF DURA MATER, OPEN APPROACH, DIAGNOSTIC: ICD-10-PCS | Performed by: NEUROLOGICAL SURGERY

## 2017-03-30 PROCEDURE — 00C20ZZ EXTIRPATION OF MATTER FROM DURA MATER, OPEN APPROACH: ICD-10-PCS | Performed by: NEUROLOGICAL SURGERY

## 2017-03-30 PROCEDURE — 82947 ASSAY GLUCOSE BLOOD QUANT: CPT | Performed by: ANESTHESIOLOGY

## 2017-03-30 PROCEDURE — 84295 ASSAY OF SERUM SODIUM: CPT | Performed by: ANESTHESIOLOGY

## 2017-03-30 PROCEDURE — 37000008 ZZH ANESTHESIA TECHNICAL FEE, 1ST 30 MIN: Performed by: NEUROLOGICAL SURGERY

## 2017-03-30 PROCEDURE — 25000565 ZZH ISOFLURANE, EA 15 MIN: Performed by: NEUROLOGICAL SURGERY

## 2017-03-30 PROCEDURE — 40000014 ZZH STATISTIC ARTERIAL MONITORING DAILY

## 2017-03-30 PROCEDURE — 84132 ASSAY OF SERUM POTASSIUM: CPT | Performed by: NEUROLOGICAL SURGERY

## 2017-03-30 PROCEDURE — 85018 HEMOGLOBIN: CPT | Performed by: NEUROLOGICAL SURGERY

## 2017-03-30 PROCEDURE — 85027 COMPLETE CBC AUTOMATED: CPT | Performed by: ANESTHESIOLOGY

## 2017-03-30 PROCEDURE — 27210794 ZZH OR GENERAL SUPPLY STERILE: Performed by: NEUROLOGICAL SURGERY

## 2017-03-30 PROCEDURE — 87641 MR-STAPH DNA AMP PROBE: CPT | Performed by: INTERNAL MEDICINE

## 2017-03-30 PROCEDURE — 82803 BLOOD GASES ANY COMBINATION: CPT | Performed by: ANESTHESIOLOGY

## 2017-03-30 PROCEDURE — 25000128 H RX IP 250 OP 636: Performed by: ANESTHESIOLOGY

## 2017-03-30 PROCEDURE — C1713 ANCHOR/SCREW BN/BN,TIS/BN: HCPCS | Performed by: NEUROLOGICAL SURGERY

## 2017-03-30 PROCEDURE — 27210995 ZZH RX 272: Performed by: NEUROLOGICAL SURGERY

## 2017-03-30 PROCEDURE — 83735 ASSAY OF MAGNESIUM: CPT | Performed by: NEUROLOGICAL SURGERY

## 2017-03-30 PROCEDURE — 70450 CT HEAD/BRAIN W/O DYE: CPT

## 2017-03-30 PROCEDURE — 86900 BLOOD TYPING SEROLOGIC ABO: CPT | Performed by: NEUROLOGICAL SURGERY

## 2017-03-30 PROCEDURE — 85027 COMPLETE CBC AUTOMATED: CPT | Performed by: NEUROLOGICAL SURGERY

## 2017-03-30 PROCEDURE — 25000128 H RX IP 250 OP 636

## 2017-03-30 PROCEDURE — 36000074 ZZH SURGERY LEVEL 6 1ST 30 MIN - UMMC: Performed by: NEUROLOGICAL SURGERY

## 2017-03-30 PROCEDURE — 71000014 ZZH RECOVERY PHASE 1 LEVEL 2 FIRST HR: Performed by: NEUROLOGICAL SURGERY

## 2017-03-30 PROCEDURE — 87640 STAPH A DNA AMP PROBE: CPT | Performed by: INTERNAL MEDICINE

## 2017-03-30 PROCEDURE — 20000004 ZZH R&B ICU UMMC

## 2017-03-30 PROCEDURE — 82330 ASSAY OF CALCIUM: CPT | Performed by: ANESTHESIOLOGY

## 2017-03-30 PROCEDURE — 25000128 H RX IP 250 OP 636: Performed by: NURSE ANESTHETIST, CERTIFIED REGISTERED

## 2017-03-30 PROCEDURE — C9399 UNCLASSIFIED DRUGS OR BIOLOG: HCPCS | Performed by: NURSE ANESTHETIST, CERTIFIED REGISTERED

## 2017-03-30 PROCEDURE — 84132 ASSAY OF SERUM POTASSIUM: CPT | Performed by: ANESTHESIOLOGY

## 2017-03-30 DEVICE — IMP PLATE SYN STR DOG BONE LOW PROFILE 2H TI 421.502
Type: IMPLANTABLE DEVICE | Site: SKULL | Status: NON-FUNCTIONAL
Removed: 2020-12-16

## 2017-03-30 DEVICE — IMP PLATE SYN BOX LOW PROFILE 10X16MM 04H TI 421.521
Type: IMPLANTABLE DEVICE | Site: SKULL | Status: NON-FUNCTIONAL
Removed: 2020-12-16

## 2017-03-30 DEVICE — IMP BUR HOLE COVER 17MM LOW PROFILE TI 421.527
Type: IMPLANTABLE DEVICE | Site: SKULL | Status: NON-FUNCTIONAL
Removed: 2020-12-16

## 2017-03-30 RX ORDER — METOCLOPRAMIDE HYDROCHLORIDE 5 MG/ML
10 INJECTION INTRAMUSCULAR; INTRAVENOUS EVERY 6 HOURS PRN
Status: DISCONTINUED | OUTPATIENT
Start: 2017-03-30 | End: 2017-04-04 | Stop reason: HOSPADM

## 2017-03-30 RX ORDER — POLYETHYLENE GLYCOL 3350 17 G/17G
17 POWDER, FOR SOLUTION ORAL 2 TIMES DAILY
Status: DISCONTINUED | OUTPATIENT
Start: 2017-03-30 | End: 2017-04-04 | Stop reason: HOSPADM

## 2017-03-30 RX ORDER — METHOCARBAMOL 500 MG/1
500 TABLET, FILM COATED ORAL 4 TIMES DAILY
Status: DISCONTINUED | OUTPATIENT
Start: 2017-03-30 | End: 2017-04-04 | Stop reason: HOSPADM

## 2017-03-30 RX ORDER — METOCLOPRAMIDE HYDROCHLORIDE 5 MG/ML
10 INJECTION INTRAMUSCULAR; INTRAVENOUS EVERY 6 HOURS PRN
Status: DISCONTINUED | OUTPATIENT
Start: 2017-03-30 | End: 2017-03-30 | Stop reason: HOSPADM

## 2017-03-30 RX ORDER — NALOXONE HYDROCHLORIDE 0.4 MG/ML
.1-.4 INJECTION, SOLUTION INTRAMUSCULAR; INTRAVENOUS; SUBCUTANEOUS
Status: DISCONTINUED | OUTPATIENT
Start: 2017-03-30 | End: 2017-04-04 | Stop reason: HOSPADM

## 2017-03-30 RX ORDER — HYDROMORPHONE HYDROCHLORIDE 1 MG/ML
.3-.5 INJECTION, SOLUTION INTRAMUSCULAR; INTRAVENOUS; SUBCUTANEOUS
Status: DISCONTINUED | OUTPATIENT
Start: 2017-03-30 | End: 2017-04-01

## 2017-03-30 RX ORDER — HYDRALAZINE HYDROCHLORIDE 20 MG/ML
10-20 INJECTION INTRAMUSCULAR; INTRAVENOUS EVERY 30 MIN PRN
Status: DISCONTINUED | OUTPATIENT
Start: 2017-03-30 | End: 2017-04-04 | Stop reason: HOSPADM

## 2017-03-30 RX ORDER — BUDESONIDE AND FORMOTEROL FUMARATE DIHYDRATE 80; 4.5 UG/1; UG/1
2 AEROSOL RESPIRATORY (INHALATION)
Status: DISCONTINUED | OUTPATIENT
Start: 2017-03-30 | End: 2017-04-04 | Stop reason: HOSPADM

## 2017-03-30 RX ORDER — SODIUM CHLORIDE, SODIUM LACTATE, POTASSIUM CHLORIDE, CALCIUM CHLORIDE 600; 310; 30; 20 MG/100ML; MG/100ML; MG/100ML; MG/100ML
INJECTION, SOLUTION INTRAVENOUS CONTINUOUS PRN
Status: DISCONTINUED | OUTPATIENT
Start: 2017-03-30 | End: 2017-03-30

## 2017-03-30 RX ORDER — AMOXICILLIN 250 MG
2 CAPSULE ORAL 2 TIMES DAILY
Status: DISCONTINUED | OUTPATIENT
Start: 2017-03-30 | End: 2017-04-04 | Stop reason: HOSPADM

## 2017-03-30 RX ORDER — GADOBUTROL 604.72 MG/ML
10 INJECTION INTRAVENOUS ONCE
Status: COMPLETED | OUTPATIENT
Start: 2017-03-30 | End: 2017-03-30

## 2017-03-30 RX ORDER — METOCLOPRAMIDE 10 MG/1
10 TABLET ORAL EVERY 6 HOURS PRN
Status: DISCONTINUED | OUTPATIENT
Start: 2017-03-30 | End: 2017-04-04 | Stop reason: HOSPADM

## 2017-03-30 RX ORDER — VENLAFAXINE HYDROCHLORIDE 75 MG/1
75 TABLET, EXTENDED RELEASE ORAL EVERY MORNING
Status: DISCONTINUED | OUTPATIENT
Start: 2017-03-31 | End: 2017-04-04 | Stop reason: HOSPADM

## 2017-03-30 RX ORDER — ONDANSETRON 4 MG/1
4-8 TABLET, ORALLY DISINTEGRATING ORAL EVERY 6 HOURS PRN
Status: DISCONTINUED | OUTPATIENT
Start: 2017-03-30 | End: 2017-04-04 | Stop reason: HOSPADM

## 2017-03-30 RX ORDER — TRAMADOL HYDROCHLORIDE 50 MG/1
50 TABLET ORAL EVERY 6 HOURS PRN
Status: DISCONTINUED | OUTPATIENT
Start: 2017-03-30 | End: 2017-04-04 | Stop reason: HOSPADM

## 2017-03-30 RX ORDER — SIMETHICONE 80 MG
80 TABLET,CHEWABLE ORAL EVERY 6 HOURS PRN
Status: DISCONTINUED | OUTPATIENT
Start: 2017-03-30 | End: 2017-04-04 | Stop reason: HOSPADM

## 2017-03-30 RX ORDER — LABETALOL HYDROCHLORIDE 5 MG/ML
10-40 INJECTION, SOLUTION INTRAVENOUS EVERY 10 MIN PRN
Status: DISCONTINUED | OUTPATIENT
Start: 2017-03-30 | End: 2017-04-04 | Stop reason: HOSPADM

## 2017-03-30 RX ORDER — CLINDAMYCIN PHOSPHATE 900 MG/50ML
900 INJECTION, SOLUTION INTRAVENOUS SEE ADMIN INSTRUCTIONS
Status: DISCONTINUED | OUTPATIENT
Start: 2017-03-30 | End: 2017-03-30 | Stop reason: HOSPADM

## 2017-03-30 RX ORDER — ONDANSETRON 4 MG/1
4 TABLET, ORALLY DISINTEGRATING ORAL EVERY 30 MIN PRN
Status: DISCONTINUED | OUTPATIENT
Start: 2017-03-30 | End: 2017-03-30 | Stop reason: HOSPADM

## 2017-03-30 RX ORDER — SODIUM CHLORIDE 9 MG/ML
INJECTION, SOLUTION INTRAVENOUS CONTINUOUS
Status: DISCONTINUED | OUTPATIENT
Start: 2017-03-30 | End: 2017-04-02

## 2017-03-30 RX ORDER — GABAPENTIN 300 MG/1
300 CAPSULE ORAL AT BEDTIME
Status: DISCONTINUED | OUTPATIENT
Start: 2017-03-30 | End: 2017-04-04 | Stop reason: HOSPADM

## 2017-03-30 RX ORDER — ALBUTEROL SULFATE 90 UG/1
2 AEROSOL, METERED RESPIRATORY (INHALATION) 2 TIMES DAILY
Status: DISCONTINUED | OUTPATIENT
Start: 2017-03-30 | End: 2017-04-04 | Stop reason: HOSPADM

## 2017-03-30 RX ORDER — MEPERIDINE HYDROCHLORIDE 25 MG/ML
12.5 INJECTION INTRAMUSCULAR; INTRAVENOUS; SUBCUTANEOUS EVERY 5 MIN PRN
Status: DISCONTINUED | OUTPATIENT
Start: 2017-03-30 | End: 2017-03-30 | Stop reason: HOSPADM

## 2017-03-30 RX ORDER — CALCIUM CHLORIDE 100 MG/ML
INJECTION INTRAVENOUS; INTRAVENTRICULAR PRN
Status: DISCONTINUED | OUTPATIENT
Start: 2017-03-30 | End: 2017-03-30

## 2017-03-30 RX ORDER — FENTANYL CITRATE 50 UG/ML
INJECTION, SOLUTION INTRAMUSCULAR; INTRAVENOUS PRN
Status: DISCONTINUED | OUTPATIENT
Start: 2017-03-30 | End: 2017-03-30

## 2017-03-30 RX ORDER — ONDANSETRON 2 MG/ML
4 INJECTION INTRAMUSCULAR; INTRAVENOUS EVERY 30 MIN PRN
Status: DISCONTINUED | OUTPATIENT
Start: 2017-03-30 | End: 2017-03-30 | Stop reason: HOSPADM

## 2017-03-30 RX ORDER — METOCLOPRAMIDE 10 MG/1
10 TABLET ORAL EVERY 6 HOURS PRN
Status: DISCONTINUED | OUTPATIENT
Start: 2017-03-30 | End: 2017-03-30 | Stop reason: HOSPADM

## 2017-03-30 RX ORDER — BUPIVACAINE HYDROCHLORIDE AND EPINEPHRINE 2.5; 5 MG/ML; UG/ML
INJECTION, SOLUTION EPIDURAL; INFILTRATION; INTRACAUDAL; PERINEURAL PRN
Status: DISCONTINUED | OUTPATIENT
Start: 2017-03-30 | End: 2017-03-30 | Stop reason: HOSPADM

## 2017-03-30 RX ORDER — ONDANSETRON 2 MG/ML
INJECTION INTRAMUSCULAR; INTRAVENOUS PRN
Status: DISCONTINUED | OUTPATIENT
Start: 2017-03-30 | End: 2017-03-30

## 2017-03-30 RX ORDER — OXYCODONE HYDROCHLORIDE 5 MG/1
5-10 TABLET ORAL EVERY 4 HOURS PRN
Status: DISCONTINUED | OUTPATIENT
Start: 2017-03-30 | End: 2017-03-30

## 2017-03-30 RX ORDER — PROPOFOL 10 MG/ML
INJECTION, EMULSION INTRAVENOUS PRN
Status: DISCONTINUED | OUTPATIENT
Start: 2017-03-30 | End: 2017-03-30

## 2017-03-30 RX ORDER — LIDOCAINE HYDROCHLORIDE 20 MG/ML
INJECTION, SOLUTION INFILTRATION; PERINEURAL PRN
Status: DISCONTINUED | OUTPATIENT
Start: 2017-03-30 | End: 2017-03-30

## 2017-03-30 RX ORDER — VENLAFAXINE HYDROCHLORIDE 75 MG/1
75 CAPSULE, EXTENDED RELEASE ORAL EVERY MORNING
Status: DISCONTINUED | OUTPATIENT
Start: 2017-03-31 | End: 2017-03-30 | Stop reason: CLARIF

## 2017-03-30 RX ORDER — HYDRALAZINE HYDROCHLORIDE 20 MG/ML
2.5-5 INJECTION INTRAMUSCULAR; INTRAVENOUS EVERY 10 MIN PRN
Status: DISCONTINUED | OUTPATIENT
Start: 2017-03-30 | End: 2017-03-30 | Stop reason: HOSPADM

## 2017-03-30 RX ORDER — MANNITOL 20 G/100ML
INJECTION, SOLUTION INTRAVENOUS PRN
Status: DISCONTINUED | OUTPATIENT
Start: 2017-03-30 | End: 2017-03-30

## 2017-03-30 RX ORDER — NALOXONE HYDROCHLORIDE 0.4 MG/ML
.1-.4 INJECTION, SOLUTION INTRAMUSCULAR; INTRAVENOUS; SUBCUTANEOUS
Status: DISCONTINUED | OUTPATIENT
Start: 2017-03-30 | End: 2017-03-30 | Stop reason: HOSPADM

## 2017-03-30 RX ORDER — DIPHENHYDRAMINE HCL 25 MG
50 CAPSULE ORAL ONCE
Status: COMPLETED | OUTPATIENT
Start: 2017-03-30 | End: 2017-03-30

## 2017-03-30 RX ORDER — DEXAMETHASONE SODIUM PHOSPHATE 4 MG/ML
4 INJECTION, SOLUTION INTRA-ARTICULAR; INTRALESIONAL; INTRAMUSCULAR; INTRAVENOUS; SOFT TISSUE
Status: DISCONTINUED | OUTPATIENT
Start: 2017-03-31 | End: 2017-04-02

## 2017-03-30 RX ORDER — SODIUM CHLORIDE 9 MG/ML
INJECTION, SOLUTION INTRAVENOUS CONTINUOUS PRN
Status: DISCONTINUED | OUTPATIENT
Start: 2017-03-30 | End: 2017-03-30

## 2017-03-30 RX ORDER — ALBUMIN, HUMAN INJ 5% 5 %
SOLUTION INTRAVENOUS CONTINUOUS PRN
Status: DISCONTINUED | OUTPATIENT
Start: 2017-03-30 | End: 2017-03-30

## 2017-03-30 RX ORDER — LABETALOL HYDROCHLORIDE 5 MG/ML
10 INJECTION, SOLUTION INTRAVENOUS
Status: DISCONTINUED | OUTPATIENT
Start: 2017-03-30 | End: 2017-03-30 | Stop reason: HOSPADM

## 2017-03-30 RX ORDER — SODIUM CHLORIDE, SODIUM LACTATE, POTASSIUM CHLORIDE, CALCIUM CHLORIDE 600; 310; 30; 20 MG/100ML; MG/100ML; MG/100ML; MG/100ML
INJECTION, SOLUTION INTRAVENOUS CONTINUOUS
Status: DISCONTINUED | OUTPATIENT
Start: 2017-03-30 | End: 2017-03-30 | Stop reason: HOSPADM

## 2017-03-30 RX ORDER — ONDANSETRON 2 MG/ML
4-8 INJECTION INTRAMUSCULAR; INTRAVENOUS EVERY 6 HOURS PRN
Status: DISCONTINUED | OUTPATIENT
Start: 2017-03-30 | End: 2017-04-04 | Stop reason: HOSPADM

## 2017-03-30 RX ORDER — FENTANYL CITRATE 50 UG/ML
25-50 INJECTION, SOLUTION INTRAMUSCULAR; INTRAVENOUS
Status: DISCONTINUED | OUTPATIENT
Start: 2017-03-30 | End: 2017-03-30 | Stop reason: HOSPADM

## 2017-03-30 RX ORDER — PROCHLORPERAZINE 25 MG
25 SUPPOSITORY, RECTAL RECTAL EVERY 12 HOURS PRN
Status: DISCONTINUED | OUTPATIENT
Start: 2017-03-30 | End: 2017-04-04 | Stop reason: HOSPADM

## 2017-03-30 RX ORDER — CLINDAMYCIN PHOSPHATE 900 MG/50ML
900 INJECTION, SOLUTION INTRAVENOUS
Status: COMPLETED | OUTPATIENT
Start: 2017-03-30 | End: 2017-03-30

## 2017-03-30 RX ORDER — PROCHLORPERAZINE MALEATE 5 MG
5-10 TABLET ORAL EVERY 6 HOURS PRN
Status: DISCONTINUED | OUTPATIENT
Start: 2017-03-30 | End: 2017-04-04 | Stop reason: HOSPADM

## 2017-03-30 RX ORDER — LIDOCAINE 40 MG/G
CREAM TOPICAL
Status: DISCONTINUED | OUTPATIENT
Start: 2017-03-30 | End: 2017-04-04 | Stop reason: HOSPADM

## 2017-03-30 RX ORDER — MEDROXYPROGESTERONE ACETATE 150 MG/ML
150 INJECTION, SUSPENSION INTRAMUSCULAR
Status: DISCONTINUED | OUTPATIENT
Start: 2017-03-30 | End: 2017-03-30 | Stop reason: CLARIF

## 2017-03-30 RX ADMIN — PHENYLEPHRINE HYDROCHLORIDE 100 MCG: 10 INJECTION, SOLUTION INTRAMUSCULAR; INTRAVENOUS; SUBCUTANEOUS at 10:10

## 2017-03-30 RX ADMIN — LIDOCAINE HYDROCHLORIDE 100 MG: 20 INJECTION, SOLUTION INFILTRATION; PERINEURAL at 08:02

## 2017-03-30 RX ADMIN — ROCURONIUM BROMIDE 5 MG: 10 INJECTION INTRAVENOUS at 16:06

## 2017-03-30 RX ADMIN — MANNITOL 50 G: 20 INJECTION, SOLUTION INTRAVENOUS at 10:30

## 2017-03-30 RX ADMIN — FENTANYL CITRATE 25 MCG: 50 INJECTION, SOLUTION INTRAMUSCULAR; INTRAVENOUS at 18:35

## 2017-03-30 RX ADMIN — HYDROMORPHONE HYDROCHLORIDE 0.5 MG: 10 INJECTION, SOLUTION INTRAMUSCULAR; INTRAVENOUS; SUBCUTANEOUS at 20:22

## 2017-03-30 RX ADMIN — TRAMADOL HYDROCHLORIDE 50 MG: 50 TABLET, COATED ORAL at 22:44

## 2017-03-30 RX ADMIN — POLYETHYLENE GLYCOL 3350 17 G: 17 POWDER, FOR SOLUTION ORAL at 21:29

## 2017-03-30 RX ADMIN — CLINDAMYCIN PHOSPHATE 900 MG: 18 INJECTION, SOLUTION INTRAVENOUS at 15:15

## 2017-03-30 RX ADMIN — ONDANSETRON 4 MG: 2 INJECTION INTRAMUSCULAR; INTRAVENOUS at 17:24

## 2017-03-30 RX ADMIN — ALBUTEROL SULFATE 2 PUFF: 90 AEROSOL, METERED RESPIRATORY (INHALATION) at 21:14

## 2017-03-30 RX ADMIN — FENTANYL CITRATE 350 MCG: 50 INJECTION, SOLUTION INTRAMUSCULAR; INTRAVENOUS at 09:07

## 2017-03-30 RX ADMIN — PHENYLEPHRINE HYDROCHLORIDE 100 MCG: 10 INJECTION, SOLUTION INTRAMUSCULAR; INTRAVENOUS; SUBCUTANEOUS at 15:07

## 2017-03-30 RX ADMIN — SUGAMMADEX 200 MG: 100 INJECTION, SOLUTION INTRAVENOUS at 16:35

## 2017-03-30 RX ADMIN — ROCURONIUM BROMIDE 20 MG: 10 INJECTION INTRAVENOUS at 09:07

## 2017-03-30 RX ADMIN — FENTANYL CITRATE 25 MCG: 50 INJECTION, SOLUTION INTRAMUSCULAR; INTRAVENOUS at 18:21

## 2017-03-30 RX ADMIN — ROCURONIUM BROMIDE 20 MG: 10 INJECTION INTRAVENOUS at 15:24

## 2017-03-30 RX ADMIN — SODIUM CHLORIDE, POTASSIUM CHLORIDE, SODIUM LACTATE AND CALCIUM CHLORIDE: 600; 310; 30; 20 INJECTION, SOLUTION INTRAVENOUS at 07:38

## 2017-03-30 RX ADMIN — ONDANSETRON 4 MG: 2 INJECTION INTRAMUSCULAR; INTRAVENOUS at 16:30

## 2017-03-30 RX ADMIN — SODIUM CHLORIDE, POTASSIUM CHLORIDE, SODIUM LACTATE AND CALCIUM CHLORIDE: 600; 310; 30; 20 INJECTION, SOLUTION INTRAVENOUS at 09:08

## 2017-03-30 RX ADMIN — HYDROMORPHONE HYDROCHLORIDE 0.5 MG: 10 INJECTION, SOLUTION INTRAMUSCULAR; INTRAVENOUS; SUBCUTANEOUS at 21:36

## 2017-03-30 RX ADMIN — SODIUM CHLORIDE: 9 INJECTION, SOLUTION INTRAVENOUS at 13:40

## 2017-03-30 RX ADMIN — ROCURONIUM BROMIDE 10 MG: 10 INJECTION INTRAVENOUS at 11:48

## 2017-03-30 RX ADMIN — ROCURONIUM BROMIDE 50 MG: 10 INJECTION INTRAVENOUS at 08:02

## 2017-03-30 RX ADMIN — ROCURONIUM BROMIDE 20 MG: 10 INJECTION INTRAVENOUS at 09:57

## 2017-03-30 RX ADMIN — FENTANYL CITRATE 25 MCG: 50 INJECTION, SOLUTION INTRAMUSCULAR; INTRAVENOUS at 17:58

## 2017-03-30 RX ADMIN — FENTANYL CITRATE 250 MCG: 50 INJECTION, SOLUTION INTRAMUSCULAR; INTRAVENOUS at 08:44

## 2017-03-30 RX ADMIN — ONDANSETRON 4 MG: 2 INJECTION INTRAMUSCULAR; INTRAVENOUS at 20:39

## 2017-03-30 RX ADMIN — Medication 2 MCG/KG/HR: at 09:06

## 2017-03-30 RX ADMIN — GABAPENTIN 300 MG: 300 CAPSULE ORAL at 22:40

## 2017-03-30 RX ADMIN — SODIUM CHLORIDE, POTASSIUM CHLORIDE, SODIUM LACTATE AND CALCIUM CHLORIDE: 600; 310; 30; 20 INJECTION, SOLUTION INTRAVENOUS at 15:42

## 2017-03-30 RX ADMIN — GADOBUTROL 10 ML: 604.72 INJECTION INTRAVENOUS at 06:42

## 2017-03-30 RX ADMIN — SENNOSIDES AND DOCUSATE SODIUM 2 TABLET: 8.6; 5 TABLET ORAL at 21:29

## 2017-03-30 RX ADMIN — CALCIUM CHLORIDE 500 MG: 100 INJECTION, SOLUTION INTRAVENOUS at 14:09

## 2017-03-30 RX ADMIN — PROPOFOL 100 MG: 10 INJECTION, EMULSION INTRAVENOUS at 09:07

## 2017-03-30 RX ADMIN — SODIUM CHLORIDE, POTASSIUM CHLORIDE, SODIUM LACTATE AND CALCIUM CHLORIDE: 600; 310; 30; 20 INJECTION, SOLUTION INTRAVENOUS at 08:57

## 2017-03-30 RX ADMIN — SODIUM CHLORIDE: 9 INJECTION, SOLUTION INTRAVENOUS at 17:39

## 2017-03-30 RX ADMIN — BUDESONIDE AND FORMOTEROL FUMARATE DIHYDRATE 2 PUFF: 80; 4.5 AEROSOL RESPIRATORY (INHALATION) at 21:15

## 2017-03-30 RX ADMIN — ALBUMIN (HUMAN): 12.5 SOLUTION INTRAVENOUS at 15:27

## 2017-03-30 RX ADMIN — MIDAZOLAM HYDROCHLORIDE 1 MG: 1 INJECTION, SOLUTION INTRAMUSCULAR; INTRAVENOUS at 07:55

## 2017-03-30 RX ADMIN — ROCURONIUM BROMIDE 10 MG: 10 INJECTION INTRAVENOUS at 11:09

## 2017-03-30 RX ADMIN — FENTANYL CITRATE 250 MCG: 50 INJECTION, SOLUTION INTRAMUSCULAR; INTRAVENOUS at 08:02

## 2017-03-30 RX ADMIN — ALBUMIN (HUMAN): 12.5 SOLUTION INTRAVENOUS at 11:44

## 2017-03-30 RX ADMIN — ALBUMIN (HUMAN): 12.5 SOLUTION INTRAVENOUS at 11:30

## 2017-03-30 RX ADMIN — SIMETHICONE CHEW TAB 80 MG 80 MG: 80 TABLET ORAL at 21:25

## 2017-03-30 RX ADMIN — FENTANYL CITRATE 50 MCG: 50 INJECTION, SOLUTION INTRAMUSCULAR; INTRAVENOUS at 16:54

## 2017-03-30 RX ADMIN — DIPHENHYDRAMINE HYDROCHLORIDE 50 MG: 25 CAPSULE ORAL at 06:19

## 2017-03-30 RX ADMIN — ROCURONIUM BROMIDE 20 MG: 10 INJECTION INTRAVENOUS at 13:10

## 2017-03-30 RX ADMIN — ROCURONIUM BROMIDE 10 MG: 10 INJECTION INTRAVENOUS at 12:30

## 2017-03-30 RX ADMIN — CALCIUM CHLORIDE 500 MG: 100 INJECTION, SOLUTION INTRAVENOUS at 13:57

## 2017-03-30 RX ADMIN — MANNITOL 50 G: 20 INJECTION, SOLUTION INTRAVENOUS at 10:00

## 2017-03-30 RX ADMIN — PHENYLEPHRINE HYDROCHLORIDE 100 MCG: 10 INJECTION, SOLUTION INTRAMUSCULAR; INTRAVENOUS; SUBCUTANEOUS at 15:00

## 2017-03-30 RX ADMIN — METHOCARBAMOL 500 MG: 500 TABLET ORAL at 19:57

## 2017-03-30 RX ADMIN — PHENYLEPHRINE HYDROCHLORIDE 100 MCG: 10 INJECTION, SOLUTION INTRAMUSCULAR; INTRAVENOUS; SUBCUTANEOUS at 14:22

## 2017-03-30 RX ADMIN — PROPOFOL 200 MG: 10 INJECTION, EMULSION INTRAVENOUS at 08:02

## 2017-03-30 RX ADMIN — ROCURONIUM BROMIDE 10 MG: 10 INJECTION INTRAVENOUS at 14:26

## 2017-03-30 RX ADMIN — PHENYLEPHRINE HYDROCHLORIDE 100 MCG: 10 INJECTION, SOLUTION INTRAMUSCULAR; INTRAVENOUS; SUBCUTANEOUS at 10:05

## 2017-03-30 RX ADMIN — ROCURONIUM BROMIDE 10 MG: 10 INJECTION INTRAVENOUS at 13:51

## 2017-03-30 RX ADMIN — CLINDAMYCIN PHOSPHATE 900 MG: 18 INJECTION, SOLUTION INTRAVENOUS at 09:00

## 2017-03-30 ASSESSMENT — VISUAL ACUITY
OU: BLURRED VISION
OU: BLURRED VISION
OU: NORMAL ACUITY
OU: OTHER (SEE COMMENT)
OU: BLURRED VISION
OU: OTHER (SEE COMMENT)
OU: BLURRED VISION

## 2017-03-30 ASSESSMENT — PAIN DESCRIPTION - DESCRIPTORS
DESCRIPTORS: ACHING;DISCOMFORT
DESCRIPTORS: HEADACHE

## 2017-03-30 NOTE — IP AVS SNAPSHOT
[address] [address]    Phone:  [phone]                                       After Visit Summary   3/30/2017    Chari Coates    MRN: [id number]           After Visit Summary Signature Page     I have received my discharge instructions, and my questions have been answered. I have discussed any challenges I see with this plan with the nurse or doctor.    ..........................................................................................................................................  Patient/Patient Representative Signature      ..........................................................................................................................................  Patient Representative Print Name and Relationship to Patient    ..................................................               ................................................  Date                                            Time    ..........................................................................................................................................  Reviewed by Signature/Title    ...................................................              ..............................................  Date                                                            Time

## 2017-03-30 NOTE — OR NURSING
Pt's pre op care complete. Neuro checks are intact except C/O numbness and tingling in fingers since she was diagnosed with brain tumor. She went to CT. Pt said she is allergic to contrast dye,it makes her flush but takes benadryl prior to procedure. Dr. Modi notified,order received for po Benadryl prior to CT scan. Given before pt went for CT.

## 2017-03-30 NOTE — OR NURSING
Dr. Espinosa at bedside. No immediate concerns. Headache and nausea continue but patient able to rest between cares well. Receiving RN stated that he spoke with Dr. Frazier, MRI will be completed in morning. Not needed during transit from PACU to ICU. Patient easily aroused and appropriate. Talkative and pleasant.

## 2017-03-30 NOTE — IP AVS SNAPSHOT
MRN:[id number]                      After Visit Summary   3/30/2017    Chari Coates    MRN: [id number]           Thank you!     Thank you for choosing [facility] for your care. Our goal is always to provide you with excellent care. Hearing back from our patients is one way we can continue to improve our services. Please take a few minutes to complete the written survey that you may receive in the mail after you visit with us. Thank you!        Patient Information     Date Of Birth          1975        About your hospital stay     You were admitted on:  March 30, 2017 You last received care in the:  [address] [facility]    You were discharged on:  April 4, 2017        Reason for your hospital stay       Craniotomy for tumor resection                  Who to Call     For medical emergencies, please call 911.  For non-urgent questions about your medical care, please call your primary care provider or clinic, [phone]  For questions related to your surgery, please call your surgery clinic        Attending Provider     Provider Ángel Seymour MD Neurosurgery       Primary Care Provider Office Phone # Fax #    Zack Spanglerford [phone]       [facility] [address] [place] [address]        After Care Instructions     Activity       Your activity upon discharge:   Walk as tolerated  No lifting over 10 pounds  No strenuous activity  No driving until re-evaluated or cleared by Dr Lau            Diet       Follow this diet upon discharge: Orders Placed This Encounter      Advance Diet as Tolerated: Regular Diet Adult            Discharge Instructions       You underwent surgery to have a brain tumor removed by Ángel Lau MD   - If you have not received the results of the pathology (diagnosis) at the time of discharge, our office will call you with these results as soon as they become available, or we will discuss them with you during your  clinic visit, whichever is first.     - If you are on a steroid medication (decadron or dexamethasone) please follow the detailed instructions with the prescription to slowly decrease the amount you are taking.   - You will have follow up scheduled with the physician assistants and/or nurse practitioners in our clinic 2 weeks after your surgery.  If you live far away, you may see your primary care doctor for a wound check at 2 weeks.     - If you have not heard from our clinic about your follow up visit by 3-4 days following your discharge, please call our clinic at [phone] to schedule an appointment with the Neurosurgery teams.     After discharge, your activity restrictions are:   -We encourage short frequent walks, increasing as tolerated.  - No driving until you are seen in clinic and cleared by your neurosurgeon.  If you have had a seizure, you may not drive for at least 3 months according to [place] law.    - No strenuous activity.  - No lifting more than 10 pounds until you are seen in clinic (a gallon of milk weighs approximately 8 pounds)    Wound cares after surgery  - You are ok to shower, but do not soak your incisions. Pat them dry if they get damp.   - Avoid coloring your hair or permanent styling until cleared by your surgeon  - No baths, hot tubs or pools for 4-6 weeks after surgery.   - No strenuous activity.  - No lifting more than 10 pounds until you are seen in clinic (a gallon of milk weighs approximately 8 pounds)  - You are ok to shower, but do not soak your incisions. Pat them dry if they get damp.   - Avoid coloring your hair or permanent styling until cleared by your surgeon  - No baths, hot tubs or pools for 4-6 weeks after surgery.       Call if you have any of the followin. Temperature greater than 101.5 F.   2. Any redness, swelling or discharge from the wound.   3. Any new weakness, numbness or altered mental status.  4. Worsening pain that is not improving with the pain  medications you were prescribed.     Call [phone] or after 5:00 pm or on weekends call [phone] and ask for the neurosurgery resident on call. Thank You.                  Follow-up Appointments     Adult [facility] Follow-up and recommended labs and tests       Follow up with primary care provider, RAYA WALDRON, in 10-14 days for suture removal     Follow up with Dr Ángel Lau in 6 weeks for follow up- no pre appointment imaging required      Appointments on [place] and/or [facility] (with [facility] or [facility] provider or service). Call [phone] if you haven't heard regarding these appointments within 7 days of discharge.                  Your next 10 appointments already scheduled     May 24, 2017  2:15 PM CDT   Return Visit with Ángel Lau MD   Peds Neurosurgery ([facility])    [facility]  [address] [facility]  [address] [facility] [address] [phone]              Additional Services     Occupational Therapy Referral       Outpatient Rehab  [facility]  Phone: [phone]    Treatment: Evaluation & Treatment.  Pt s/p craniotomy for meningioma resection.   Special Instructions/Modalities: none    Special Programs:  Vision rehab    Please be aware that coverage of these services is subject to the terms and limitations of your health insurance plan.  Call member services at your health plan with any benefit or coverage questions.      **Note to Provider:  If you are referring outside of [facility] for the therapy appointment, please list the name of the location in the  special instructions  above, print the referral and give to the patient to schedule the appointment.            Physical Therapy Referral       Outpatient Rehab  [facility]  Phone:  [phone]  Fax:  [phone]    [facility] provides Physical Therapy evaluation and treatment and many specialty services across the [facility] system.  If requesting a  "specialty program, please choose from the list below.    Treatment: Evaluation & Treatment.  Pt s/p craniotomy for meningioma resection.   Special Instructions/Modalities: none  Special Programs: None    Please be aware that coverage of these services is subject to the terms and limitations of your health insurance plan.  Call member services at your health plan with any benefit or coverage questions.      **Note to Provider:  If you are referring outside of [facility] for the therapy appointment, please list the name of the location in the  special instructions  above, print the referral and give to the patient to schedule the appointment.                  Additional Information     If you use hormonal birth control (such as the pill, patch, ring or implants): You'll need a second form of birth control for 7 days (condoms, a diaphragm or contraceptive foam). While in the hospital, you received a medicine called Bridion. Your normal birth control will not work as well for a week after taking this medicine.          Pending Results     No orders found from 3/28/2017 to 3/31/2017.            Statement of Approval     Ordered          04/04/17 0807  I have reviewed and agree with all the recommendations and orders detailed in this document.  EFFECTIVE NOW     Approved and electronically signed by:  Briana Boyer APRN CNP             Admission Information     Date & Time Provider Department Dept. Phone    3/30/2017 Ángel Lau MD [address] [facility] [phone]      Your Vitals Were     Blood Pressure Pulse Temperature Respirations Height Weight    149/75 (BP Location: Left arm) 94 98.7  F (37.1  C) (Oral) 18 1.651 m (5' 5\") 106.6 kg (235 lb 0.2 oz)    Pulse Oximetry BMI (Body Mass Index)                95% 39.11 kg/m2          MyChart Information     [employer] lets you send messages to your doctor, view your test results, renew your prescriptions, schedule appointments and more. To sign up, " "go to [email or web address] . Click on \"Log in\" on the left side of the screen, which will take you to the Welcome page. Then click on \"Sign up Now\" on the right side of the page.     You will be asked to enter the access code listed below, as well as some personal information. Please follow the directions to create your username and password.     Your access code is: [email or web address]  Expires: 2017  5:43 PM     Your access code will  in 90 days. If you need help or a new code, please call your [facility] clinic or [phone].        Care EveryWhere ID     This is your Care EveryWhere ID. This could be used by other organizations to access your [facility] medical records  [id number]           Review of your medicines      START taking        Dose / Directions    dexamethasone 2 MG tablet   Commonly known as:  DECADRON   Used for:  S/P craniotomy        Dose:  2 mg   Take 1 tablet (2 mg) by mouth every 8 hours   Quantity:  40 tablet   Refills:  0       oxyCODONE 5 MG IR tablet   Commonly known as:  ROXICODONE   Used for:  S/P craniotomy        Dose:  5 mg   1 tablet (5 mg) by Oral or Feeding Tube route every 4 hours as needed for moderate to severe pain   Quantity:  45 tablet   Refills:  0       pantoprazole 40 MG EC tablet   Commonly known as:  PROTONIX   Used for:  S/P craniotomy        Dose:  40 mg   Take 1 tablet (40 mg) by mouth every morning   Quantity:  30 tablet   Refills:  0       senna-docusate 8.6-50 MG per tablet   Commonly known as:  SENOKOT-S;PERICOLACE   Used for:  S/P craniotomy        Dose:  2 tablet   Take 2 tablets by mouth 2 times daily   Quantity:  30 tablet   Refills:  0       traMADol 50 MG tablet   Commonly known as:  ULTRAM   Used for:  S/P craniotomy        Dose:  50 mg   Take 1 tablet (50 mg) by mouth every 6 hours as needed   Quantity:  30 tablet   Refills:  0         CONTINUE these medicines which have NOT CHANGED        Dose / Directions    albuterol 108 (90 BASE) MCG/ACT " Inhaler   Commonly known as:  PROAIR HFA/PROVENTIL HFA/VENTOLIN HFA        Dose:  2 puff   Inhale 2 puffs into the lungs 2 times daily   Refills:  0       DEPO-PROVERA 150 MG/ML injection   Generic drug:  medroxyPROGESTERone        Dose:  150 mg   Inject 150 mg into the muscle every 3 months   Refills:  0       diphenhydrAMINE 25 MG capsule   Commonly known as:  BENADRYL        Take by mouth as needed   Refills:  0       GABAPENTIN PO        Dose:  300 mg   Take 300 mg by mouth At Bedtime   Refills:  0       LINZESS PO        Dose:  145 mcg   Take 145 mcg by mouth every morning (before breakfast)   Refills:  0       methocarbamol 500 MG tablet   Commonly known as:  ROBAXIN        Take by mouth 4 times daily as needed for muscle spasms   Refills:  0       SYMBICORT IN        Inhale into the lungs 2 times daily   Refills:  0       venlafaxine 75 MG 24 hr capsule   Commonly known as:  EFFEXOR-XR        Dose:  75 mg   Take 75 mg by mouth every morning   Refills:  0       VITAMIN D PO        Dose:  33365 Units   Take 50,000 Units by mouth twice a week   Refills:  0       ZOFRAN PO        Take by mouth as needed Pt. Unaware of strength-- New Med   Refills:  0            Where to get your medicines      These medications were sent to [facility] - [place], [place] - [address] [facility]  [address] [facility] [address]     Phone:  [phone]     dexamethasone 2 MG tablet    pantoprazole 40 MG EC tablet    senna-docusate 8.6-50 MG per tablet         Some of these will need a paper prescription and others can be bought over the counter. Ask your nurse if you have questions.     Bring a paper prescription for each of these medications     oxyCODONE 5 MG IR tablet    traMADol 50 MG tablet                Protect others around you: Learn how to safely use, store and throw away your medicines at www.disposemymeds.org.             Medication List: This is a list of all your medications and when to take  them. Check marks below indicate your daily home schedule. Keep this list as a reference.      Medications           Morning Afternoon Evening Bedtime As Needed    albuterol 108 (90 BASE) MCG/ACT Inhaler   Commonly known as:  PROAIR HFA/PROVENTIL HFA/VENTOLIN HFA   Inhale 2 puffs into the lungs 2 times daily   Last time this was given:  2 puffs on 4/4/2017  7:39 AM                                DEPO-PROVERA 150 MG/ML injection   Inject 150 mg into the muscle every 3 months   Generic drug:  medroxyPROGESTERone                                dexamethasone 2 MG tablet   Commonly known as:  DECADRON   Take 1 tablet (2 mg) by mouth every 8 hours   Last time this was given:  4 mg on 4/4/2017 12:08 AM                                diphenhydrAMINE 25 MG capsule   Commonly known as:  BENADRYL   Take by mouth as needed   Last time this was given:  25 mg on 4/4/2017 10:08 AM                                GABAPENTIN PO   Take 300 mg by mouth At Bedtime   Last time this was given:  300 mg on 3/31/2017 10:15 PM                                LINZESS PO   Take 145 mcg by mouth every morning (before breakfast)   Last time this was given:  145 mcg on 4/3/2017  7:49 AM                                methocarbamol 500 MG tablet   Commonly known as:  ROBAXIN   Take by mouth 4 times daily as needed for muscle spasms   Last time this was given:  500 mg on 4/3/2017 12:01 PM                                oxyCODONE 5 MG IR tablet   Commonly known as:  ROXICODONE   1 tablet (5 mg) by Oral or Feeding Tube route every 4 hours as needed for moderate to severe pain   Last time this was given:  5 mg on 4/2/2017 10:09 PM                                pantoprazole 40 MG EC tablet   Commonly known as:  PROTONIX   Take 1 tablet (40 mg) by mouth every morning   Last time this was given:  40 mg on 4/4/2017  7:38 AM                                senna-docusate 8.6-50 MG per tablet   Commonly known as:  SENOKOT-S;PERICOLACE   Take 2 tablets by  mouth 2 times daily   Last time this was given:  2 tablets on 4/1/2017  8:57 AM                                SYMBICORT IN   Inhale into the lungs 2 times daily   Last time this was given:  2 puffs on 4/4/2017  7:39 AM                                traMADol 50 MG tablet   Commonly known as:  ULTRAM   Take 1 tablet (50 mg) by mouth every 6 hours as needed   Last time this was given:  50 mg on 4/3/2017  5:34 PM                                venlafaxine 75 MG 24 hr capsule   Commonly known as:  EFFEXOR-XR   Take 75 mg by mouth every morning                                VITAMIN D PO   Take 50,000 Units by mouth twice a week                                ZOFRAN PO   Take by mouth as needed Pt. Unaware of strength-- New Med

## 2017-03-30 NOTE — ANESTHESIA PROCEDURE NOTES
Arterial Line Procedure Note  Staff:     Anesthesiologist:  ZACKERY MONTOYA    Resident/CRNA:  DELTA PASTRANA    Arterial line performed by resident/CRNA in presence of a teaching physician    Location: In OR After Induction  Procedure Start/Stop Times:     patient identified, IV checked, risks and benefits discussed, informed consent, monitors and equipment checked, pre-op evaluation and at physician/surgeon's request      Correct Patient: Yes      Correct Position: Yes      Correct Site: Yes      Correct Procedure: Yes      Correct Laterality:  N/A    Site Marked:  N/A  Line Placement:     Procedure:  Arterial Line    Insertion Site:  Radial    Insertion laterality:  Left    Skin Prep: Chloraprep      Patient Prep: patient draped, mask, sterile gloves, sterile gown, hat and hand hygiene      Local skin infiltration:  None    Ultrasound Guided?: Yes      Artery evaluated via ultrasound confirming patency.   Using realtime imaging, the artery was punctured and the needle was observed entering the artery.      A permanent image is entered into patient's chart.      Catheter size:  20 gauge, 12 cm    Cath secured with: suture      Dressing:  Tegaderm    Complications:  None obvious    Arterial waveform: Yes      IBP within 10% of NIBP: Yes

## 2017-03-30 NOTE — ANESTHESIA POSTPROCEDURE EVALUATION
Patient: Chari Coates    Procedure(s):  Stealth Assisted Craniotomy, Meningioma Resection - Wound Class: I-Clean    Diagnosis:Meningioma  Diagnosis Additional Information: No value filed.    Anesthesia Type:  General, RSI, ETT    Note:  Anesthesia Post Evaluation    Patient location during evaluation: PACU  Patient participation: Able to fully participate in evaluation  Level of consciousness: awake and alert  Pain management: adequate  Airway patency: patent  Cardiovascular status: acceptable  Respiratory status: acceptable  Hydration status: acceptable  PONV: controlled     Anesthetic complications: None          Last vitals:  Vitals:    03/30/17 1745 03/30/17 1800 03/30/17 1815   BP:      Resp: 16 18 18   Temp:   36.4  C (97.6  F)   SpO2:            Electronically Signed By: Ernesto Espinosa MD  March 30, 2017  6:36 PM

## 2017-03-30 NOTE — ANESTHESIA CARE TRANSFER NOTE
Patient: Chari Coates    Procedure(s):  Stealth Assisted Craniotomy, Meningioma Resection - Wound Class: I-Clean    Diagnosis: Meningioma  Diagnosis Additional Information: No value filed.    Anesthesia Type:   General, RSI, ETT     Note:  Airway :Face Mask  Patient transferred to:PACU  Comments: VSS. Breathing spont. Report to RN at bedside.      Vitals: (Last set prior to Anesthesia Care Transfer)    CRNA VITALS  3/30/2017 1622 - 3/30/2017 1652      3/30/2017             Resp Rate (set): 10                Electronically Signed By: DAVIE Bañuelos CRNA  March 30, 2017  4:52 PM

## 2017-03-30 NOTE — BRIEF OP NOTE
[facility] [place]    Brief Operative Note    Pre-operative diagnosis: Meningioma  Post-operative diagnosis Meningioma  Procedure: Procedure(s):  Stealth Assisted Craniotomy, Meningioma Resection - Wound Class: I-Clean  Surgeon: Surgeon(s) and Role:     * Ángel Lau MD - Primary     * Kaitlin Cardenas - Resident - Assisting     * Hay Frazier MD - Resident - Assisting  Anesthesia: General   Estimated blood loss: 2500cc, 2units PRBCs transfused in OR.  Drains: None  Specimens:   ID Type Source Tests Collected by Time Destination   A : Right Tentorial Tumor Tissue Brain SURGICAL PATHOLOGY EXAM Ángel Lau MD 3/30/2017 10:35 AM    B : Right Tentorial Tumor Tissue Brain SURGICAL PATHOLOGY EXAM Ángel Lau MD 3/30/2017  2:44 PM    C : Right Tentorial Tumor (Sonopet contents) Tissue Brain SURGICAL PATHOLOGY EXAM Ángel Lau MD 3/30/2017  3:40 PM      Findings:   meningioma with good resection.  Complications: None.

## 2017-03-31 ENCOUNTER — APPOINTMENT (OUTPATIENT)
Dept: GENERAL RADIOLOGY | Facility: CLINIC | Age: 42
DRG: 025 | End: 2017-03-31
Attending: STUDENT IN AN ORGANIZED HEALTH CARE EDUCATION/TRAINING PROGRAM
Payer: MEDICARE

## 2017-03-31 ENCOUNTER — APPOINTMENT (OUTPATIENT)
Dept: MRI IMAGING | Facility: CLINIC | Age: 42
DRG: 025 | End: 2017-03-31
Attending: NEUROLOGICAL SURGERY
Payer: MEDICARE

## 2017-03-31 LAB
ANION GAP SERPL CALCULATED.3IONS-SCNC: 11 MMOL/L (ref 3–14)
ANION GAP SERPL CALCULATED.3IONS-SCNC: 9 MMOL/L (ref 3–14)
APTT PPP: 24 SEC (ref 22–37)
BASOPHILS # BLD AUTO: 0 10E9/L (ref 0–0.2)
BASOPHILS NFR BLD AUTO: 0.1 %
BLD PROD TYP BPU: NORMAL
BLD UNIT ID BPU: 0
BLOOD PRODUCT CODE: NORMAL
BPU ID: NORMAL
BUN SERPL-MCNC: 5 MG/DL (ref 7–30)
BUN SERPL-MCNC: 6 MG/DL (ref 7–30)
CA-I BLD-MCNC: 4.4 MG/DL (ref 4.4–5.2)
CALCIUM SERPL-MCNC: 8.2 MG/DL (ref 8.5–10.1)
CALCIUM SERPL-MCNC: 8.3 MG/DL (ref 8.5–10.1)
CHLORIDE SERPL-SCNC: 109 MMOL/L (ref 94–109)
CHLORIDE SERPL-SCNC: 112 MMOL/L (ref 94–109)
CO2 SERPL-SCNC: 21 MMOL/L (ref 20–32)
CO2 SERPL-SCNC: 22 MMOL/L (ref 20–32)
CREAT SERPL-MCNC: 0.57 MG/DL (ref 0.52–1.04)
CREAT SERPL-MCNC: 0.6 MG/DL (ref 0.52–1.04)
DIFFERENTIAL METHOD BLD: ABNORMAL
EOSINOPHIL # BLD AUTO: 0 10E9/L (ref 0–0.7)
EOSINOPHIL NFR BLD AUTO: 0 %
ERYTHROCYTE [DISTWIDTH] IN BLOOD BY AUTOMATED COUNT: 14.9 % (ref 10–15)
ERYTHROCYTE [DISTWIDTH] IN BLOOD BY AUTOMATED COUNT: 15.3 % (ref 10–15)
FIBRINOGEN PPP-MCNC: 219 MG/DL (ref 200–420)
GFR SERPL CREATININE-BSD FRML MDRD: ABNORMAL ML/MIN/1.7M2
GFR SERPL CREATININE-BSD FRML MDRD: ABNORMAL ML/MIN/1.7M2
GLUCOSE SERPL-MCNC: 145 MG/DL (ref 70–99)
GLUCOSE SERPL-MCNC: 148 MG/DL (ref 70–99)
HCT VFR BLD AUTO: 23.6 % (ref 35–47)
HCT VFR BLD AUTO: 25.6 % (ref 35–47)
HGB BLD-MCNC: 7.9 G/DL (ref 11.7–15.7)
HGB BLD-MCNC: 8.6 G/DL (ref 11.7–15.7)
IMM GRANULOCYTES # BLD: 0.1 10E9/L (ref 0–0.4)
IMM GRANULOCYTES NFR BLD: 0.6 %
INR PPP: 1.23 (ref 0.86–1.14)
LYMPHOCYTES # BLD AUTO: 0.8 10E9/L (ref 0.8–5.3)
LYMPHOCYTES NFR BLD AUTO: 4.9 %
MAGNESIUM SERPL-MCNC: 1.7 MG/DL (ref 1.6–2.3)
MAGNESIUM SERPL-MCNC: 2 MG/DL (ref 1.6–2.3)
MCH RBC QN AUTO: 29.5 PG (ref 26.5–33)
MCH RBC QN AUTO: 29.8 PG (ref 26.5–33)
MCHC RBC AUTO-ENTMCNC: 33.5 G/DL (ref 31.5–36.5)
MCHC RBC AUTO-ENTMCNC: 33.6 G/DL (ref 31.5–36.5)
MCV RBC AUTO: 88 FL (ref 78–100)
MCV RBC AUTO: 89 FL (ref 78–100)
MONOCYTES # BLD AUTO: 0.4 10E9/L (ref 0–1.3)
MONOCYTES NFR BLD AUTO: 2.9 %
NEUTROPHILS # BLD AUTO: 14.1 10E9/L (ref 1.6–8.3)
NEUTROPHILS NFR BLD AUTO: 91.5 %
NRBC # BLD AUTO: 0 10*3/UL
NRBC BLD AUTO-RTO: 0 /100
PHOSPHATE SERPL-MCNC: 1.4 MG/DL (ref 2.5–4.5)
PHOSPHATE SERPL-MCNC: 2.8 MG/DL (ref 2.5–4.5)
PLATELET # BLD AUTO: 128 10E9/L (ref 150–450)
PLATELET # BLD AUTO: 129 10E9/L (ref 150–450)
POTASSIUM SERPL-SCNC: 4 MMOL/L (ref 3.4–5.3)
POTASSIUM SERPL-SCNC: 4.2 MMOL/L (ref 3.4–5.3)
RADIOLOGIST FLAGS: ABNORMAL
RBC # BLD AUTO: 2.65 10E12/L (ref 3.8–5.2)
RBC # BLD AUTO: 2.92 10E12/L (ref 3.8–5.2)
SODIUM SERPL-SCNC: 139 MMOL/L (ref 133–144)
SODIUM SERPL-SCNC: 143 MMOL/L (ref 133–144)
TRANSFUSION STATUS PATIENT QL: NORMAL
TRANSFUSION STATUS PATIENT QL: NORMAL
WBC # BLD AUTO: 11.7 10E9/L (ref 4–11)
WBC # BLD AUTO: 15.4 10E9/L (ref 4–11)

## 2017-03-31 PROCEDURE — 25000128 H RX IP 250 OP 636: Performed by: STUDENT IN AN ORGANIZED HEALTH CARE EDUCATION/TRAINING PROGRAM

## 2017-03-31 PROCEDURE — 85384 FIBRINOGEN ACTIVITY: CPT | Performed by: NEUROLOGICAL SURGERY

## 2017-03-31 PROCEDURE — 25500064 ZZH RX 255 OP 636: Performed by: NEUROLOGICAL SURGERY

## 2017-03-31 PROCEDURE — 85025 COMPLETE CBC W/AUTO DIFF WBC: CPT | Performed by: STUDENT IN AN ORGANIZED HEALTH CARE EDUCATION/TRAINING PROGRAM

## 2017-03-31 PROCEDURE — 25000128 H RX IP 250 OP 636: Performed by: NEUROLOGICAL SURGERY

## 2017-03-31 PROCEDURE — 40000141 ZZH STATISTIC PERIPHERAL IV START W/O US GUIDANCE

## 2017-03-31 PROCEDURE — 25000132 ZZH RX MED GY IP 250 OP 250 PS 637: Mod: GY | Performed by: NEUROLOGICAL SURGERY

## 2017-03-31 PROCEDURE — 71010 XR CHEST PORT 1 VW: CPT

## 2017-03-31 PROCEDURE — 70553 MRI BRAIN STEM W/O & W/DYE: CPT

## 2017-03-31 PROCEDURE — 80048 BASIC METABOLIC PNL TOTAL CA: CPT | Performed by: NEUROLOGICAL SURGERY

## 2017-03-31 PROCEDURE — 83735 ASSAY OF MAGNESIUM: CPT | Performed by: NEUROLOGICAL SURGERY

## 2017-03-31 PROCEDURE — 86901 BLOOD TYPING SEROLOGIC RH(D): CPT | Performed by: NEUROLOGICAL SURGERY

## 2017-03-31 PROCEDURE — 36415 COLL VENOUS BLD VENIPUNCTURE: CPT | Performed by: STUDENT IN AN ORGANIZED HEALTH CARE EDUCATION/TRAINING PROGRAM

## 2017-03-31 PROCEDURE — A9270 NON-COVERED ITEM OR SERVICE: HCPCS | Mod: GY | Performed by: NEUROLOGICAL SURGERY

## 2017-03-31 PROCEDURE — 36415 COLL VENOUS BLD VENIPUNCTURE: CPT | Performed by: NEUROLOGICAL SURGERY

## 2017-03-31 PROCEDURE — 85730 THROMBOPLASTIN TIME PARTIAL: CPT | Performed by: NEUROLOGICAL SURGERY

## 2017-03-31 PROCEDURE — 93010 ELECTROCARDIOGRAM REPORT: CPT | Performed by: INTERNAL MEDICINE

## 2017-03-31 PROCEDURE — A9585 GADOBUTROL INJECTION: HCPCS | Performed by: NEUROLOGICAL SURGERY

## 2017-03-31 PROCEDURE — 86900 BLOOD TYPING SEROLOGIC ABO: CPT | Performed by: NEUROLOGICAL SURGERY

## 2017-03-31 PROCEDURE — 25000125 ZZHC RX 250

## 2017-03-31 PROCEDURE — 85610 PROTHROMBIN TIME: CPT | Performed by: NEUROLOGICAL SURGERY

## 2017-03-31 PROCEDURE — P9016 RBC LEUKOCYTES REDUCED: HCPCS | Performed by: PHYSICIAN ASSISTANT

## 2017-03-31 PROCEDURE — 86850 RBC ANTIBODY SCREEN: CPT | Performed by: NEUROLOGICAL SURGERY

## 2017-03-31 PROCEDURE — 93005 ELECTROCARDIOGRAM TRACING: CPT

## 2017-03-31 PROCEDURE — 82330 ASSAY OF CALCIUM: CPT | Performed by: NEUROLOGICAL SURGERY

## 2017-03-31 PROCEDURE — 84100 ASSAY OF PHOSPHORUS: CPT | Performed by: NEUROLOGICAL SURGERY

## 2017-03-31 PROCEDURE — 25000125 ZZHC RX 250: Performed by: STUDENT IN AN ORGANIZED HEALTH CARE EDUCATION/TRAINING PROGRAM

## 2017-03-31 PROCEDURE — 20000004 ZZH R&B ICU UMMC

## 2017-03-31 PROCEDURE — 85027 COMPLETE CBC AUTOMATED: CPT | Performed by: NEUROLOGICAL SURGERY

## 2017-03-31 RX ORDER — GADOBUTROL 604.72 MG/ML
10 INJECTION INTRAVENOUS ONCE
Status: COMPLETED | OUTPATIENT
Start: 2017-03-31 | End: 2017-03-31

## 2017-03-31 RX ORDER — POTASSIUM CHLORIDE 1.5 G/1.58G
20-40 POWDER, FOR SOLUTION ORAL
Status: DISCONTINUED | OUTPATIENT
Start: 2017-03-31 | End: 2017-04-04 | Stop reason: HOSPADM

## 2017-03-31 RX ORDER — POTASSIUM CHLORIDE 29.8 MG/ML
20 INJECTION INTRAVENOUS
Status: DISCONTINUED | OUTPATIENT
Start: 2017-03-31 | End: 2017-04-04 | Stop reason: HOSPADM

## 2017-03-31 RX ORDER — DIPHENHYDRAMINE HCL 25 MG
50 CAPSULE ORAL ONCE
Status: COMPLETED | OUTPATIENT
Start: 2017-03-31 | End: 2017-03-31

## 2017-03-31 RX ORDER — DIPHENHYDRAMINE HCL 25 MG
25 CAPSULE ORAL EVERY 6 HOURS PRN
Status: DISCONTINUED | OUTPATIENT
Start: 2017-03-31 | End: 2017-04-04 | Stop reason: HOSPADM

## 2017-03-31 RX ORDER — POTASSIUM CHLORIDE 750 MG/1
20-40 TABLET, EXTENDED RELEASE ORAL
Status: DISCONTINUED | OUTPATIENT
Start: 2017-03-31 | End: 2017-04-04 | Stop reason: HOSPADM

## 2017-03-31 RX ORDER — MAGNESIUM SULFATE HEPTAHYDRATE 40 MG/ML
4 INJECTION, SOLUTION INTRAVENOUS EVERY 4 HOURS PRN
Status: DISCONTINUED | OUTPATIENT
Start: 2017-03-31 | End: 2017-04-04 | Stop reason: HOSPADM

## 2017-03-31 RX ORDER — POTASSIUM CHLORIDE 7.45 MG/ML
10 INJECTION INTRAVENOUS
Status: DISCONTINUED | OUTPATIENT
Start: 2017-03-31 | End: 2017-04-04 | Stop reason: HOSPADM

## 2017-03-31 RX ADMIN — SODIUM CHLORIDE: 9 INJECTION, SOLUTION INTRAVENOUS at 15:34

## 2017-03-31 RX ADMIN — POLYETHYLENE GLYCOL 3350 17 G: 17 POWDER, FOR SOLUTION ORAL at 20:06

## 2017-03-31 RX ADMIN — VENLAFAXINE HYDROCHLORIDE 75 MG: 75 TABLET, EXTENDED RELEASE ORAL at 08:47

## 2017-03-31 RX ADMIN — SENNOSIDES AND DOCUSATE SODIUM 2 TABLET: 8.6; 5 TABLET ORAL at 20:06

## 2017-03-31 RX ADMIN — POLYETHYLENE GLYCOL 3350 17 G: 17 POWDER, FOR SOLUTION ORAL at 08:46

## 2017-03-31 RX ADMIN — SIMETHICONE CHEW TAB 80 MG 80 MG: 80 TABLET ORAL at 08:47

## 2017-03-31 RX ADMIN — ALBUTEROL SULFATE 2 PUFF: 90 AEROSOL, METERED RESPIRATORY (INHALATION) at 20:07

## 2017-03-31 RX ADMIN — METHOCARBAMOL 500 MG: 500 TABLET ORAL at 08:46

## 2017-03-31 RX ADMIN — HYDROMORPHONE HYDROCHLORIDE 0.5 MG: 10 INJECTION, SOLUTION INTRAMUSCULAR; INTRAVENOUS; SUBCUTANEOUS at 09:27

## 2017-03-31 RX ADMIN — HYDROMORPHONE HYDROCHLORIDE 0.5 MG: 10 INJECTION, SOLUTION INTRAMUSCULAR; INTRAVENOUS; SUBCUTANEOUS at 01:36

## 2017-03-31 RX ADMIN — ONDANSETRON 4 MG: 2 INJECTION INTRAMUSCULAR; INTRAVENOUS at 05:16

## 2017-03-31 RX ADMIN — DIPHENHYDRAMINE HYDROCHLORIDE 25 MG: 25 CAPSULE ORAL at 04:14

## 2017-03-31 RX ADMIN — HYDROMORPHONE HYDROCHLORIDE 0.5 MG: 10 INJECTION, SOLUTION INTRAMUSCULAR; INTRAVENOUS; SUBCUTANEOUS at 05:16

## 2017-03-31 RX ADMIN — BUDESONIDE AND FORMOTEROL FUMARATE DIHYDRATE 2 PUFF: 80; 4.5 AEROSOL RESPIRATORY (INHALATION) at 20:07

## 2017-03-31 RX ADMIN — ALBUTEROL SULFATE 2 PUFF: 90 AEROSOL, METERED RESPIRATORY (INHALATION) at 08:46

## 2017-03-31 RX ADMIN — GADOBUTROL 10 ML: 604.72 INJECTION INTRAVENOUS at 09:40

## 2017-03-31 RX ADMIN — METHOCARBAMOL 500 MG: 500 TABLET ORAL at 20:06

## 2017-03-31 RX ADMIN — LABETALOL HYDROCHLORIDE 20 MG: 5 INJECTION, SOLUTION INTRAVENOUS at 21:58

## 2017-03-31 RX ADMIN — DIPHENHYDRAMINE HYDROCHLORIDE 50 MG: 25 CAPSULE ORAL at 09:27

## 2017-03-31 RX ADMIN — SODIUM CHLORIDE 1000 ML: 9 INJECTION, SOLUTION INTRAVENOUS at 20:05

## 2017-03-31 RX ADMIN — HYDROMORPHONE HYDROCHLORIDE 0.5 MG: 10 INJECTION, SOLUTION INTRAMUSCULAR; INTRAVENOUS; SUBCUTANEOUS at 03:14

## 2017-03-31 RX ADMIN — LINACLOTIDE 145 MCG: 145 CAPSULE, GELATIN COATED ORAL at 08:47

## 2017-03-31 RX ADMIN — GABAPENTIN 300 MG: 300 CAPSULE ORAL at 22:15

## 2017-03-31 RX ADMIN — METHOCARBAMOL 500 MG: 500 TABLET ORAL at 12:31

## 2017-03-31 RX ADMIN — HYDROMORPHONE HYDROCHLORIDE 0.5 MG: 10 INJECTION, SOLUTION INTRAMUSCULAR; INTRAVENOUS; SUBCUTANEOUS at 00:20

## 2017-03-31 RX ADMIN — PROCHLORPERAZINE EDISYLATE 10 MG: 5 INJECTION INTRAMUSCULAR; INTRAVENOUS at 08:56

## 2017-03-31 RX ADMIN — DEXAMETHASONE SODIUM PHOSPHATE 4 MG: 4 INJECTION, SOLUTION INTRAMUSCULAR; INTRAVENOUS at 23:49

## 2017-03-31 RX ADMIN — SODIUM CHLORIDE: 9 INJECTION, SOLUTION INTRAVENOUS at 02:33

## 2017-03-31 RX ADMIN — DEXAMETHASONE SODIUM PHOSPHATE 4 MG: 4 INJECTION, SOLUTION INTRAMUSCULAR; INTRAVENOUS at 08:46

## 2017-03-31 RX ADMIN — ONDANSETRON 4 MG: 2 INJECTION INTRAMUSCULAR; INTRAVENOUS at 03:14

## 2017-03-31 RX ADMIN — METOPROLOL TARTRATE 5 MG: 5 INJECTION INTRAVENOUS at 22:14

## 2017-03-31 RX ADMIN — SENNOSIDES AND DOCUSATE SODIUM 2 TABLET: 8.6; 5 TABLET ORAL at 08:45

## 2017-03-31 RX ADMIN — BUDESONIDE AND FORMOTEROL FUMARATE DIHYDRATE 2 PUFF: 80; 4.5 AEROSOL RESPIRATORY (INHALATION) at 08:46

## 2017-03-31 RX ADMIN — DEXAMETHASONE SODIUM PHOSPHATE 4 MG: 4 INJECTION, SOLUTION INTRAMUSCULAR; INTRAVENOUS at 16:18

## 2017-03-31 RX ADMIN — DEXAMETHASONE SODIUM PHOSPHATE 4 MG: 4 INJECTION, SOLUTION INTRAMUSCULAR; INTRAVENOUS at 00:20

## 2017-03-31 ASSESSMENT — VISUAL ACUITY
OU: NORMAL ACUITY

## 2017-03-31 ASSESSMENT — ACTIVITIES OF DAILY LIVING (ADL)
AMBULATION: 0-->INDEPENDENT
SWALLOWING: 0-->SWALLOWS FOODS/LIQUIDS WITHOUT DIFFICULTY
FALL_HISTORY_WITHIN_LAST_SIX_MONTHS: NO
COGNITION: 0 - NO COGNITION ISSUES REPORTED
BATHING: 0-->INDEPENDENT
DRESS: 0-->INDEPENDENT
TOILETING: 0-->INDEPENDENT
RETIRED_COMMUNICATION: 0-->UNDERSTANDS/COMMUNICATES WITHOUT DIFFICULTY
TRANSFERRING: 0-->INDEPENDENT
RETIRED_EATING: 0-->INDEPENDENT

## 2017-03-31 ASSESSMENT — PAIN DESCRIPTION - DESCRIPTORS: DESCRIPTORS: HEADACHE

## 2017-03-31 NOTE — PROGRESS NOTES
[facility]  Neurosurgery Daily ICU Note:          Assessment   Chari Coates is a 41 year old female who is postoperative day #1 from right stealth assisted craniotomy for meningioma resection.           Plan 1.   Neuro: Meningioma resection    Continue frequent neuro exams while in ICU. Notify MD for acute changes in exam.    Pain control    Follow-up pathology    Post-operative MRI scheduled for today.   2. CVS: hemodynamically stable    Maintain SBP < 140    Hydralazine and labetolol PRN    Continuous cardiac monitoring while in ICU  3. Pulmonary: no issues    Continuous pulse oximetry    Supplemental oxygen PRN    Incentive spirometry Q1H while awake  4. GI: No issues    Advance diet as tolerated to regular    Bowel regimen    Protonix for ulcer ppx    PRN anti-emetics  5. Renal: no issues    mIVF -- TKO when patient is tolerating good PO intake    Flynn can be removed POD 1.    Electrolyte replacement protocol    Continue to monitor intake/output  6. ID: afebrile, normal WBC count    Continue to monitor for fevers and/or signs of infection  7. Endocrine: Continue steroids.     Continue glucose checks  8. Heme: no issues    Platelets > 100,000    INR < 1.5    Hemoglobin > 8  9. Prophylaxis    DVT: SCDs while in bed    GI: Protonix  10. Disposition: Surgical ICU. Stable. Anticipate transfer to floor today.     PT/OT    Out of bed    Ambulate QID with assistance as tolerated  Above patient and plan has been discussed with the neurosurgery chief resident.     Please dial * * * [phone] and enter [phone] to reach the on-call neurosurgery resident if you have questions.          Subjective     No acute events overnight. Doing well in post-operative period.           Objective   Temp:  [97.6  F (36.4  C)-98.6  F (37  C)] 98.4  F (36.9  C)  Heart Rate:  [103-117] 117  Resp:  [13-24] 13  BP: (107-135)/(64-83) 135/69  SpO2:  [95 %-99 %] 95 %  Resp: 13    I/O last 3 completed shifts:  In: 7623  [P.O.:195; I.V.:4535]  Out: 6260 [Urine:3760; Blood:2500]    Physical Exam  General: NAD, lying comfortably in bed  Incision: clean, dry, dressing intact  Neurologic    Mental Status:       -- Awake; Alert; oriented x 3      -- Follows commands       -- Speech fluent, spontaneous. No aphasia or dysarthria.      -- no gaze preference. No apparent hemineglect.    Cranial Nerves:      --  PERRL 3-2mm bilat and brisk      -- extraocular movements intact      -- face symmetrical, tongue midline      -- sensory V1-V3 intact bilaterally    Motor:    Delt Bi Tri WE WF    R 5 5 5 5 5 5   L 5 5 5 5 5 5    IP Quad Ham DF PF EHL   R 5 5 5 5 5 5   L 5 5 5 5 5 5     Sensory: symmetrically intact to light touch x4 extremities.       Labs and Imaging   BMP  Recent Labs  Lab 03/31/17 0409 03/30/17 1714 03/30/17  1504 03/30/17  1423    144 142 137   POTASSIUM 4.2 4.8 4.5 4.2   CHLORIDE 109 114*  --   --    CO2 22 22  --   --    BUN 5* 6*  --   --    CR 0.60 0.68  --   --    ARUEA 8.2* 8.3*  --   --        CBC  Recent Labs  Lab 03/31/17  0409 03/30/17  1714 03/30/17  1504 03/30/17  1423 03/30/17  1418 03/30/17  1341   WBC 11.7* 11.0  --   --  10.5 13.0*   HGB 8.6* 8.5* 8.7* 9.2* 9.0* 9.9*   * 131*  --   --  156 223       COAGS  Recent Labs  Lab 03/31/17  0409 03/30/17  1714 03/30/17  0545 03/29/17  1736   INR 1.23* 1.43* 0.95 0.96   PTT 24 25 24 27   FIBR 219 137*  --   --        ABG  Recent Labs  Lab 03/30/17  1203 03/30/17  1058   PH 7.36 7.37   PCO2 32* 35   PO2 84 167*   HCO3 18* 21       IMAGING No results found for this or any previous visit (from the past 24 hour(s)).     Contact the neurosurgery resident on call with questions.    Dial * * *[phone] when prompted.   Soto Modi MD, PhD  Neurosurgery PGY-2

## 2017-04-01 LAB
BLD PROD TYP BPU: NORMAL
BLD UNIT ID BPU: 0
BLOOD PRODUCT CODE: NORMAL
BPU ID: NORMAL
ERYTHROCYTE [DISTWIDTH] IN BLOOD BY AUTOMATED COUNT: 15.1 % (ref 10–15)
ERYTHROCYTE [DISTWIDTH] IN BLOOD BY AUTOMATED COUNT: 15.1 % (ref 10–15)
HCT VFR BLD AUTO: 28 % (ref 35–47)
HCT VFR BLD AUTO: 30.8 % (ref 35–47)
HGB BLD-MCNC: 10 G/DL (ref 11.7–15.7)
HGB BLD-MCNC: 9 G/DL (ref 11.7–15.7)
LACTATE BLD-SCNC: 1.7 MMOL/L (ref 0.7–2.1)
MAGNESIUM SERPL-MCNC: 2.2 MG/DL (ref 1.6–2.3)
MCH RBC QN AUTO: 28.9 PG (ref 26.5–33)
MCH RBC QN AUTO: 29.3 PG (ref 26.5–33)
MCHC RBC AUTO-ENTMCNC: 32.1 G/DL (ref 31.5–36.5)
MCHC RBC AUTO-ENTMCNC: 32.5 G/DL (ref 31.5–36.5)
MCV RBC AUTO: 90 FL (ref 78–100)
MCV RBC AUTO: 90 FL (ref 78–100)
PLATELET # BLD AUTO: 140 10E9/L (ref 150–450)
PLATELET # BLD AUTO: 141 10E9/L (ref 150–450)
RBC # BLD AUTO: 3.11 10E12/L (ref 3.8–5.2)
RBC # BLD AUTO: 3.41 10E12/L (ref 3.8–5.2)
TRANSFUSION STATUS PATIENT QL: NORMAL
TRANSFUSION STATUS PATIENT QL: NORMAL
WBC # BLD AUTO: 16.2 10E9/L (ref 4–11)
WBC # BLD AUTO: 16.3 10E9/L (ref 4–11)

## 2017-04-01 PROCEDURE — 12000003 ZZH R&B CRITICAL UMMC

## 2017-04-01 PROCEDURE — 25000125 ZZHC RX 250: Performed by: STUDENT IN AN ORGANIZED HEALTH CARE EDUCATION/TRAINING PROGRAM

## 2017-04-01 PROCEDURE — 36415 COLL VENOUS BLD VENIPUNCTURE: CPT | Performed by: NEUROLOGICAL SURGERY

## 2017-04-01 PROCEDURE — 25000128 H RX IP 250 OP 636: Performed by: NEUROLOGICAL SURGERY

## 2017-04-01 PROCEDURE — 25000132 ZZH RX MED GY IP 250 OP 250 PS 637: Mod: GY | Performed by: NEUROLOGICAL SURGERY

## 2017-04-01 PROCEDURE — A9270 NON-COVERED ITEM OR SERVICE: HCPCS | Mod: GY | Performed by: NEUROLOGICAL SURGERY

## 2017-04-01 PROCEDURE — 83605 ASSAY OF LACTIC ACID: CPT | Performed by: NEUROLOGICAL SURGERY

## 2017-04-01 PROCEDURE — 25000125 ZZHC RX 250: Performed by: NEUROLOGICAL SURGERY

## 2017-04-01 PROCEDURE — 25000132 ZZH RX MED GY IP 250 OP 250 PS 637: Mod: GY | Performed by: STUDENT IN AN ORGANIZED HEALTH CARE EDUCATION/TRAINING PROGRAM

## 2017-04-01 PROCEDURE — 27210995 ZZH RX 272

## 2017-04-01 PROCEDURE — 85027 COMPLETE CBC AUTOMATED: CPT | Performed by: NEUROLOGICAL SURGERY

## 2017-04-01 PROCEDURE — 40000556 ZZH STATISTIC PERIPHERAL IV START W US GUIDANCE

## 2017-04-01 PROCEDURE — 83735 ASSAY OF MAGNESIUM: CPT | Performed by: NEUROLOGICAL SURGERY

## 2017-04-01 PROCEDURE — P9016 RBC LEUKOCYTES REDUCED: HCPCS | Performed by: PHYSICIAN ASSISTANT

## 2017-04-01 PROCEDURE — A9270 NON-COVERED ITEM OR SERVICE: HCPCS | Mod: GY | Performed by: STUDENT IN AN ORGANIZED HEALTH CARE EDUCATION/TRAINING PROGRAM

## 2017-04-01 RX ORDER — OXYCODONE HYDROCHLORIDE 5 MG/1
5 TABLET ORAL EVERY 4 HOURS PRN
Status: DISCONTINUED | OUTPATIENT
Start: 2017-04-01 | End: 2017-04-04 | Stop reason: HOSPADM

## 2017-04-01 RX ORDER — PANTOPRAZOLE SODIUM 40 MG/1
40 TABLET, DELAYED RELEASE ORAL EVERY MORNING
Status: DISCONTINUED | OUTPATIENT
Start: 2017-04-02 | End: 2017-04-04 | Stop reason: HOSPADM

## 2017-04-01 RX ADMIN — DIPHENHYDRAMINE HYDROCHLORIDE 25 MG: 25 CAPSULE ORAL at 00:22

## 2017-04-01 RX ADMIN — DIPHENHYDRAMINE HYDROCHLORIDE 25 MG: 25 CAPSULE ORAL at 21:38

## 2017-04-01 RX ADMIN — ALBUTEROL SULFATE 2 PUFF: 90 AEROSOL, METERED RESPIRATORY (INHALATION) at 19:54

## 2017-04-01 RX ADMIN — METHOCARBAMOL 500 MG: 500 TABLET ORAL at 19:53

## 2017-04-01 RX ADMIN — TRAMADOL HYDROCHLORIDE 50 MG: 50 TABLET, COATED ORAL at 08:30

## 2017-04-01 RX ADMIN — OXYCODONE HYDROCHLORIDE 5 MG: 5 TABLET ORAL at 09:57

## 2017-04-01 RX ADMIN — ONDANSETRON 4 MG: 4 TABLET, ORALLY DISINTEGRATING ORAL at 09:34

## 2017-04-01 RX ADMIN — HYDROMORPHONE HYDROCHLORIDE 0.5 MG: 10 INJECTION, SOLUTION INTRAMUSCULAR; INTRAVENOUS; SUBCUTANEOUS at 05:57

## 2017-04-01 RX ADMIN — OXYCODONE HYDROCHLORIDE 5 MG: 5 TABLET ORAL at 21:27

## 2017-04-01 RX ADMIN — TRAMADOL HYDROCHLORIDE 50 MG: 50 TABLET, COATED ORAL at 16:47

## 2017-04-01 RX ADMIN — METHOCARBAMOL 500 MG: 500 TABLET ORAL at 08:57

## 2017-04-01 RX ADMIN — METHOCARBAMOL 500 MG: 500 TABLET ORAL at 16:18

## 2017-04-01 RX ADMIN — POLYETHYLENE GLYCOL 3350 17 G: 17 POWDER, FOR SOLUTION ORAL at 08:56

## 2017-04-01 RX ADMIN — BUDESONIDE AND FORMOTEROL FUMARATE DIHYDRATE 2 PUFF: 80; 4.5 AEROSOL RESPIRATORY (INHALATION) at 09:01

## 2017-04-01 RX ADMIN — SODIUM PHOSPHATE, MONOBASIC, MONOHYDRATE AND SODIUM PHOSPHATE, DIBASIC, ANHYDROUS 10 MMOL: 276; 142 INJECTION, SOLUTION INTRAVENOUS at 05:31

## 2017-04-01 RX ADMIN — ONDANSETRON 8 MG: 4 TABLET, ORALLY DISINTEGRATING ORAL at 00:22

## 2017-04-01 RX ADMIN — TRAMADOL HYDROCHLORIDE 50 MG: 50 TABLET, COATED ORAL at 23:14

## 2017-04-01 RX ADMIN — DEXAMETHASONE SODIUM PHOSPHATE 4 MG: 4 INJECTION, SOLUTION INTRAMUSCULAR; INTRAVENOUS at 23:14

## 2017-04-01 RX ADMIN — ALBUTEROL SULFATE 2 PUFF: 90 AEROSOL, METERED RESPIRATORY (INHALATION) at 09:01

## 2017-04-01 RX ADMIN — ONDANSETRON 8 MG: 4 TABLET, ORALLY DISINTEGRATING ORAL at 21:27

## 2017-04-01 RX ADMIN — VENLAFAXINE HYDROCHLORIDE 75 MG: 75 TABLET, EXTENDED RELEASE ORAL at 08:57

## 2017-04-01 RX ADMIN — SIMETHICONE CHEW TAB 80 MG 80 MG: 80 TABLET ORAL at 09:01

## 2017-04-01 RX ADMIN — TRAMADOL HYDROCHLORIDE 50 MG: 50 TABLET, COATED ORAL at 00:22

## 2017-04-01 RX ADMIN — METHOCARBAMOL 500 MG: 500 TABLET ORAL at 12:04

## 2017-04-01 RX ADMIN — DEXAMETHASONE SODIUM PHOSPHATE 4 MG: 4 INJECTION, SOLUTION INTRAMUSCULAR; INTRAVENOUS at 16:18

## 2017-04-01 RX ADMIN — ONDANSETRON 4 MG: 4 TABLET, ORALLY DISINTEGRATING ORAL at 12:11

## 2017-04-01 RX ADMIN — DEXAMETHASONE SODIUM PHOSPHATE 4 MG: 4 INJECTION, SOLUTION INTRAMUSCULAR; INTRAVENOUS at 08:57

## 2017-04-01 RX ADMIN — BUDESONIDE AND FORMOTEROL FUMARATE DIHYDRATE 2 PUFF: 80; 4.5 AEROSOL RESPIRATORY (INHALATION) at 19:54

## 2017-04-01 RX ADMIN — LINACLOTIDE 145 MCG: 145 CAPSULE, GELATIN COATED ORAL at 09:01

## 2017-04-01 RX ADMIN — SENNOSIDES AND DOCUSATE SODIUM 2 TABLET: 8.6; 5 TABLET ORAL at 08:57

## 2017-04-01 ASSESSMENT — VISUAL ACUITY
OU: NORMAL ACUITY

## 2017-04-01 ASSESSMENT — PAIN DESCRIPTION - DESCRIPTORS
DESCRIPTORS: HEADACHE

## 2017-04-01 NOTE — PROGRESS NOTES
[facility]  Neurosurgery Daily ICU Note:          Assessment   Chari Coates is a 41 year old female who is postoperative day #2 from right stealth assisted craniotomy for meningioma resection.           Plan 1.   Neuro: Meningioma resection    Continue frequent neuro exams while in ICU. Notify MD for acute changes in exam.    Pain control    Follow-up pathology    Head-wrap to be removed this AM on rounds.     Post-operative MRI demonstrated right PCA infarct.   2. CVS: Patient tachycardic overnight. Hemoglobin drawn and found to be low. 2 units of packed red blood cells administered. Awaiting finalized follow-up hemoglobin.   3. Pulmonary: no issues    Continuous pulse oximetry    Supplemental oxygen PRN    Incentive spirometry Q1H while awake  4. GI: No issues    Advance diet as tolerated to regular    Bowel regimen    Protonix for ulcer ppx    PRN anti-emetics  5. Renal: no issues    mIVF -- TKO when patient is tolerating good PO intake    Electrolyte replacement protocol    Continue to monitor intake/output  6. ID: afebrile, normal WBC count    Continue to monitor for fevers and/or signs of infection  7. Endocrine: Continue steroids.     Continue glucose checks  8. Heme: no issues    Platelets > 100,000    INR < 1.5    Hemoglobin > 8  9. Prophylaxis    DVT: SCDs while in bed    GI: Protonix  10. Disposition: Surgical ICU. But floor status. Awaiting bed on 6A.     PT/OT      Above patient and plan has been discussed with the neurosurgery chief resident.     Please dial * * * [phone] and enter job code [phone] to reach the on-call neurosurgery resident if you have questions.          Subjective     No acute events overnight. Doing well in post-operative period.           Objective   Temp:  [97.9  F (36.6  C)-99  F (37.2  C)] 98.7  F (37.1  C)  Heart Rate:  [] 87  Resp:  [8-29] 14  BP: ()/(54-81) 130/72  SpO2:  [92 %-98 %] 95 %  Resp: 14    I/O last 3 completed shifts:  In: 4106  [P.O.:630; I.V.:2876]  Out: 3900 [Urine:3900]    Physical Exam  General: NAD, lying comfortably in bed  Incision: clean, dry, head wrap intact  Neurologic    Mental Status:       -- Awake; Alert; oriented x 3      -- Follows commands       -- Speech fluent, spontaneous. No aphasia or dysarthria.      -- no gaze preference. No apparent hemineglect.    Cranial Nerves:      --  PERRL 3-2mm bilat and brisk, left hemifield cut.       -- extraocular movements intact      -- face symmetrical, tongue midline      -- sensory V1-V3 intact bilaterally    Motor:    Delt Bi Tri WE WF    R 5 5 5 5 5 5   L 5 5 5 5 5 5    IP Quad Ham DF PF EHL   R 5 5 5 5 5 5   L 5 5 5 5 5 5     Sensory: symmetrically intact to light touch x4 extremities.       Labs and Imaging   BMP    Recent Labs  Lab 03/31/17  2232 03/31/17  0409 03/30/17  1714 03/30/17  1504    139 144 142   POTASSIUM 4.0 4.2 4.8 4.5   CHLORIDE 112* 109 114*  --    CO2 21 22 22  --    BUN 6* 5* 6*  --    CR 0.57 0.60 0.68  --    AUREA 8.3* 8.2* 8.3*  --      CBC    Recent Labs  Lab 04/01/17  0428 03/31/17  1934 03/31/17  0409 03/30/17  1714   WBC 16.2* 15.4* 11.7* 11.0   HGB 9.0* 7.9* 8.6* 8.5*   * 128* 129* 131*     COAGS    Recent Labs  Lab 03/31/17  0409 03/30/17  1714 03/30/17  0545 03/29/17  1736   INR 1.23* 1.43* 0.95 0.96   PTT 24 25 24 27   FIBR 219 137*  --   --      ABG    Recent Labs  Lab 03/30/17  1203 03/30/17  1058   PH 7.36 7.37   PCO2 32* 35   PO2 84 167*   HCO3 18* 21        Contact the neurosurgery resident on call with questions.    Dial * * *[phone]: Enter [phone] when prompted.   Soto Modi MD, PhD  Neurosurgery PGY-2

## 2017-04-02 ENCOUNTER — APPOINTMENT (OUTPATIENT)
Dept: PHYSICAL THERAPY | Facility: CLINIC | Age: 42
DRG: 025 | End: 2017-04-02
Attending: STUDENT IN AN ORGANIZED HEALTH CARE EDUCATION/TRAINING PROGRAM
Payer: MEDICARE

## 2017-04-02 ENCOUNTER — APPOINTMENT (OUTPATIENT)
Dept: OCCUPATIONAL THERAPY | Facility: CLINIC | Age: 42
DRG: 025 | End: 2017-04-02
Attending: STUDENT IN AN ORGANIZED HEALTH CARE EDUCATION/TRAINING PROGRAM
Payer: MEDICARE

## 2017-04-02 LAB
LACTATE BLD-SCNC: 2.2 MMOL/L (ref 0.7–2.1)
MAGNESIUM SERPL-MCNC: 2.3 MG/DL (ref 1.6–2.3)

## 2017-04-02 PROCEDURE — 25000132 ZZH RX MED GY IP 250 OP 250 PS 637: Mod: GY | Performed by: NEUROLOGICAL SURGERY

## 2017-04-02 PROCEDURE — 25000125 ZZHC RX 250: Performed by: NEUROLOGICAL SURGERY

## 2017-04-02 PROCEDURE — A9270 NON-COVERED ITEM OR SERVICE: HCPCS | Mod: GY | Performed by: STUDENT IN AN ORGANIZED HEALTH CARE EDUCATION/TRAINING PROGRAM

## 2017-04-02 PROCEDURE — 97530 THERAPEUTIC ACTIVITIES: CPT | Mod: GP | Performed by: PHYSICAL THERAPIST

## 2017-04-02 PROCEDURE — 83735 ASSAY OF MAGNESIUM: CPT | Performed by: STUDENT IN AN ORGANIZED HEALTH CARE EDUCATION/TRAINING PROGRAM

## 2017-04-02 PROCEDURE — 36416 COLLJ CAPILLARY BLOOD SPEC: CPT | Performed by: STUDENT IN AN ORGANIZED HEALTH CARE EDUCATION/TRAINING PROGRAM

## 2017-04-02 PROCEDURE — 12000008 ZZH R&B INTERMEDIATE UMMC

## 2017-04-02 PROCEDURE — A9270 NON-COVERED ITEM OR SERVICE: HCPCS | Mod: GY | Performed by: NEUROLOGICAL SURGERY

## 2017-04-02 PROCEDURE — 25000132 ZZH RX MED GY IP 250 OP 250 PS 637: Mod: GY | Performed by: STUDENT IN AN ORGANIZED HEALTH CARE EDUCATION/TRAINING PROGRAM

## 2017-04-02 PROCEDURE — 97535 SELF CARE MNGMENT TRAINING: CPT | Mod: GO

## 2017-04-02 PROCEDURE — 40000193 ZZH STATISTIC PT WARD VISIT: Performed by: PHYSICAL THERAPIST

## 2017-04-02 PROCEDURE — 97116 GAIT TRAINING THERAPY: CPT | Mod: GP | Performed by: PHYSICAL THERAPIST

## 2017-04-02 PROCEDURE — 83605 ASSAY OF LACTIC ACID: CPT | Performed by: NEUROLOGICAL SURGERY

## 2017-04-02 PROCEDURE — 25000125 ZZHC RX 250: Performed by: STUDENT IN AN ORGANIZED HEALTH CARE EDUCATION/TRAINING PROGRAM

## 2017-04-02 PROCEDURE — 25000131 ZZH RX MED GY IP 250 OP 636 PS 637: Mod: GY | Performed by: NEUROLOGICAL SURGERY

## 2017-04-02 PROCEDURE — 97162 PT EVAL MOD COMPLEX 30 MIN: CPT | Mod: GP | Performed by: PHYSICAL THERAPIST

## 2017-04-02 PROCEDURE — 36415 COLL VENOUS BLD VENIPUNCTURE: CPT | Performed by: NEUROLOGICAL SURGERY

## 2017-04-02 PROCEDURE — 97165 OT EVAL LOW COMPLEX 30 MIN: CPT | Mod: GO

## 2017-04-02 PROCEDURE — 40000133 ZZH STATISTIC OT WARD VISIT

## 2017-04-02 RX ORDER — DEXAMETHASONE 4 MG/1
4 TABLET ORAL EVERY 8 HOURS SCHEDULED
Status: DISCONTINUED | OUTPATIENT
Start: 2017-04-02 | End: 2017-04-04 | Stop reason: HOSPADM

## 2017-04-02 RX ORDER — ACETAMINOPHEN 325 MG/1
650 TABLET ORAL EVERY 4 HOURS PRN
Status: DISCONTINUED | OUTPATIENT
Start: 2017-04-02 | End: 2017-04-04 | Stop reason: HOSPADM

## 2017-04-02 RX ORDER — ACETAMINOPHEN 650 MG/1
650 SUPPOSITORY RECTAL EVERY 4 HOURS PRN
Status: DISCONTINUED | OUTPATIENT
Start: 2017-04-02 | End: 2017-04-04 | Stop reason: HOSPADM

## 2017-04-02 RX ADMIN — ACETAMINOPHEN 650 MG: 325 TABLET, FILM COATED ORAL at 10:38

## 2017-04-02 RX ADMIN — PANTOPRAZOLE SODIUM 40 MG: 40 TABLET, DELAYED RELEASE ORAL at 07:45

## 2017-04-02 RX ADMIN — DIPHENHYDRAMINE HYDROCHLORIDE 25 MG: 25 CAPSULE ORAL at 22:47

## 2017-04-02 RX ADMIN — DEXAMETHASONE 4 MG: 4 TABLET ORAL at 15:50

## 2017-04-02 RX ADMIN — TRAMADOL HYDROCHLORIDE 50 MG: 50 TABLET, COATED ORAL at 05:54

## 2017-04-02 RX ADMIN — BUDESONIDE AND FORMOTEROL FUMARATE DIHYDRATE 2 PUFF: 80; 4.5 AEROSOL RESPIRATORY (INHALATION) at 19:40

## 2017-04-02 RX ADMIN — ACETAMINOPHEN 650 MG: 325 TABLET, FILM COATED ORAL at 22:09

## 2017-04-02 RX ADMIN — OXYCODONE HYDROCHLORIDE 5 MG: 5 TABLET ORAL at 22:09

## 2017-04-02 RX ADMIN — TRAMADOL HYDROCHLORIDE 50 MG: 50 TABLET, COATED ORAL at 23:11

## 2017-04-02 RX ADMIN — DEXAMETHASONE 4 MG: 4 TABLET ORAL at 23:11

## 2017-04-02 RX ADMIN — TRAMADOL HYDROCHLORIDE 50 MG: 50 TABLET, COATED ORAL at 14:53

## 2017-04-02 RX ADMIN — ALBUTEROL SULFATE 2 PUFF: 90 AEROSOL, METERED RESPIRATORY (INHALATION) at 07:45

## 2017-04-02 RX ADMIN — ONDANSETRON 8 MG: 4 TABLET, ORALLY DISINTEGRATING ORAL at 03:51

## 2017-04-02 RX ADMIN — OXYCODONE HYDROCHLORIDE 5 MG: 5 TABLET ORAL at 03:51

## 2017-04-02 RX ADMIN — ONDANSETRON 8 MG: 4 TABLET, ORALLY DISINTEGRATING ORAL at 22:11

## 2017-04-02 RX ADMIN — METHOCARBAMOL 500 MG: 500 TABLET ORAL at 07:45

## 2017-04-02 RX ADMIN — VENLAFAXINE HYDROCHLORIDE 75 MG: 75 TABLET, EXTENDED RELEASE ORAL at 07:45

## 2017-04-02 RX ADMIN — BUDESONIDE AND FORMOTEROL FUMARATE DIHYDRATE 2 PUFF: 80; 4.5 AEROSOL RESPIRATORY (INHALATION) at 07:45

## 2017-04-02 RX ADMIN — OXYCODONE HYDROCHLORIDE 5 MG: 5 TABLET ORAL at 18:00

## 2017-04-02 RX ADMIN — OXYCODONE HYDROCHLORIDE 5 MG: 5 TABLET ORAL at 08:05

## 2017-04-02 RX ADMIN — DEXAMETHASONE SODIUM PHOSPHATE 4 MG: 4 INJECTION, SOLUTION INTRAMUSCULAR; INTRAVENOUS at 07:45

## 2017-04-02 RX ADMIN — ALBUTEROL SULFATE 2 PUFF: 90 AEROSOL, METERED RESPIRATORY (INHALATION) at 19:40

## 2017-04-02 ASSESSMENT — ACTIVITIES OF DAILY LIVING (ADL): PREVIOUS_RESPONSIBILITIES: MEAL PREP;HOUSEKEEPING;LAUNDRY;SHOPPING;YARDWORK;MEDICATION MANAGEMENT;FINANCES;DRIVING

## 2017-04-02 ASSESSMENT — PAIN DESCRIPTION - DESCRIPTORS
DESCRIPTORS: HEADACHE
DESCRIPTORS: HEADACHE

## 2017-04-02 ASSESSMENT — VISUAL ACUITY
OU: NORMAL ACUITY

## 2017-04-02 NOTE — PROGRESS NOTES
" 04/02/17 1000   Quick Adds   Type of Visit Initial Occupational Therapy Evaluation   Living Environment   Lives With alone   Living Arrangements house   Home Accessibility stairs to enter home;stairs within home;tub/shower is not walk in;bed not on first floor  (split level)   Number of Stairs to Enter Home 6   Number of Stairs Within Home 14   Transportation Available car;family or friend will provide   Living Environment Comment Pt lives in a split level home. Pt lives alone, with 2 small dogs. There are multiple sets of stairs to entoer home and within home.    Self-Care   Dominant Hand right   Usual Activity Tolerance moderate   Current Activity Tolerance poor   Regular Exercise no   Equipment Currently Used at Home none   Activity/Exercise/Self-Care Comment Pt reports no regular exercise. Will let her dogs out and walk with them a little in the yard.    Functional Level Prior   Ambulation 0-->independent   Transferring 0-->independent   Toileting 0-->independent   Bathing 0-->independent   Dressing 0-->independent   Eating 0-->independent   Communication 0-->understands/communicates without difficulty   Swallowing 0-->swallows foods/liquids without difficulty   Cognition 0 - no cognition issues reported   Fall history within last six months no   Prior Functional Level Comment Pt previously independent in all ADLs/IADLs.        Present no   General Information   Onset of Illness/Injury or Date of Surgery - Date 03/30/17   Referring Physician Leschke, John M, MD   Patient/Family Goals Statement To go home   Additional Occupational Profile Info/Pertinent History of Current Problem \"Chari Coates is a 41 year old female who is postoperative from right stealth assisted craniotomy for meningioma resection. \"   Precautions/Limitations other (see comments)  (craniotomy)   Heart Disease Risk Factors Overweight   General Observations Pt with incision on R skull parietal lobe.    Cognitive Status " Examination   Orientation person;place  (disorented to date and day of week)   Level of Consciousness alert   Able to Follow Commands WNL/WFL   Cognitive Comment Pt with some noted difficulty word finding and slurred speech.    Visual Perception   Visual Perception Wears glasses   Visual Field Noted significant L visual field cut.    Occulomotor Difficulty scanning to L side.    Visual Perception Comments Pt presents with a significant L visual field cut. In addition, pt reports some blurry and double vision. Pt wear glasses at baseline.    Sensory Examination   Sensory Quick Adds No deficits were identified   Pain Assessment   Patient Currently in Pain No   Integumentary/Edema   Integumentary/Edema no deficits were identifed   Posture   Posture not impaired   Range of Motion (ROM)   ROM Quick Adds No deficits were identified   Strength   Strength Comments Not ttested 2/2 precautions   Coordination   Upper Extremity Coordination No deficits were identified   Gross Motor Coordination Difficult to assess 2/2 pt limited in activity tolerance from weakness and dizziness. Attempt to stand, however no functional mobility.    Fine Motor Coordination No deficits noted   Coordination Comments Some difficulty 2/2 vision deficits   Mobility   Bed Mobility Bed mobility skill: Scooting/Bridging;Bed mobility skill: Sit to supine;Bed mobility skill: Supine to sit   Bed Mobility Skill: Sit to Supine   Level of [place]: Sit/Supine stand-by assist   Physical Assist/Nonphysical Assist: Sit/Supine verbal cues   Assistive Device: Sit/Supine (HOB elevated)   Bed Mobility Skill: Supine to Sit   Level of [place]: Supine/Sit stand-by assist   Physical Assist/Nonphysical Assist: Supine/Sit verbal cues   Assistive Device: Supine/Sit (HOB elevated)   Transfer Skills   Transfer Transfer Skill: Stand to Sit   Transfer Comments Attempt to stand, however pt limtied by weakness and dizziness.    Transfer Skill: Sit to Stand   Level of  [place]: Sit/Stand maximum assist (25% patients effort)   Physical Assist/Nonphysical Assist: Sit/Stand verbal cues;1 person assist   Upper Body Dressing   Level of [place]: Dress Upper Body minimum assist (75% patients effort)   Physical Assist/Nonphysical Assist: Dress Upper Body verbal cues;1 person assist;set-up required   Lower Body Dressing   Level of [place]: Dress Lower Body dependent (less than 25% patients effort)   Physical Assist/Nonphysical Assist: Dress Lower Body 1 person assist   Grooming   Level of [place]: Grooming stand-by assist   Physical Assist/Nonphysical Assist: Grooming supervision;set-up required;verbal cues   Instrumental Activities of Daily Living (IADL)   Previous Responsibilities meal prep;housekeeping;laundry;shopping;yardwork;medication management;finances;driving   IADL Comments Pt previously independnet in IADLs. Pt reports owning 2 dogs, independent in pet care.    Activities of Daily Living Analysis   Impairments Contributing to Impaired Activities of Daily Living balance impaired;pain   ADL Comments Pt previously independent in ADLs. Currently limitd by precautions, decreased acitivty tolerance, and visual deficits.    General Therapy Interventions   Planned Therapy Interventions ADL retraining;IADL retraining;balance training;bed mobility training;visual perception;transfer training;progressive activity/exercise   Clinical Impression   Criteria for Skilled Therapeutic Interventions Met yes, treatment indicated   OT Diagnosis IMpaired self-care ADLs/IADLs.   Influenced by the following impairments Decreased activity tolerance/weakness, dizziness, visual field deficits   Assessment of Occupational Performance 1-3 Performance Deficits   Identified Performance Deficits Visual field deficits, impaired balance, decreased activity tolerance   Clinical Decision Making (Complexity) Low complexity   Therapy Frequency daily   Predicted Duration of Therapy Intervention  "(days/wks) 1 week   Anticipated Equipment Needs at Discharge bathing equipment;dressing equipment   Anticipated Discharge Disposition Home with Assist;Home with Outpatient Therapy   Risks and Benefits of Treatment have been explained. Yes   Patient, Family & other staff in agreement with plan of care Yes   Clinical Impression Comments Pt previously independent in ADLs/IADLs. However, pt currently limited by decreased activity tolerance, visual deficits, weakness. Pt demonstrates significant visual field cut, impacting independence and safety in the enviroment and in ADLs.    [facility] [employer] TM \"6 Clicks\"   2016, Trustees of [facility], under license to [employer].  All rights reserved.   6 Clicks Short Forms Daily Activity Inpatient Short Form   [facility] AM-PAC  \"6 Clicks\" Daily Activity Inpatient Short Form   1. Putting on and taking off regular lower body clothing? 2 - A Lot   2. Bathing (including washing, rinsing, drying)? 2 - A Lot   3. Toileting, which includes using toilet, bedpan or urinal? 3 - A Little   4. Putting on and taking off regular upper body clothing? 3 - A Little   5. Taking care of personal grooming such as brushing teeth? 3 - A Little   6. Eating meals? 3 - A Little   Daily Activity Raw Score (Score out of 24.Lower scores equate to lower levels of function) 16   Total Evaluation Time   Total Evaluation Time (Minutes) 10     "

## 2017-04-02 NOTE — PROGRESS NOTES
" 04/02/17 1505   Quick Adds   Type of Visit Initial PT Evaluation   Living Environment   Lives With alone   Living Arrangements house   Home Accessibility stairs to enter home;stairs within home;tub/shower is not walk in;bed not on first floor   Number of Stairs to Enter Home 6   Number of Stairs Within Home 14   Stair Railings at Home outside, present at both sides;inside, present at both sides   Transportation Available car;family or friend will provide   Living Environment Comment Pt lives in split level home with two dogs. Pt states that she lives alone, but family is nearby and able to assist/stay with her as needed.    Self-Care   Dominant Hand right   Usual Activity Tolerance moderate   Current Activity Tolerance fair   Regular Exercise no   Equipment Currently Used at Home none   Activity/Exercise/Self-Care Comment Pt was IND with all mobility prior to admission.    Functional Level Prior   Ambulation 0-->independent   Transferring 0-->independent   Toileting 0-->independent   Bathing 0-->independent   Dressing 0-->independent   Eating 0-->independent   Communication 0-->understands/communicates without difficulty   Swallowing 0-->swallows foods/liquids without difficulty   Cognition 0 - no cognition issues reported   Fall history within last six months no   Which of the above functional risks had a recent onset or change? ambulation;transferring   Prior Functional Level Comment Pt was previously IND with all mobility and ADLs. Pt denies work or regular exercise, but states she had no difficulty caring for self, dogs, or negotiating stairs within home.   General Information   Onset of Illness/Injury or Date of Surgery - Date 04/01/17   Referring Physician Leschke, John M, MD   Patient/Family Goals Statement Get home   Pertinent History of Current Problem (include personal factors and/or comorbidities that impact the POC) Per chart review, \"41 year old female who is postoperative day #3 from right stealth " "assisted craniotomy for meningioma resection\"   Precautions/Limitations fall precautions  (craniotomy precautions)   Weight-Bearing Status - LUE full weight-bearing   Weight-Bearing Status - RUE full weight-bearing   Weight-Bearing Status - LLE full weight-bearing   Weight-Bearing Status - RLE full weight-bearing   Heart Disease Risk Factors Lack of physical activity;Overweight   General Observations R postero-lateral cranial incision   Cognitive Status Examination   Orientation orientation to person, place and time   Level of Consciousness alert   Follows Commands and Answers Questions 100% of the time   Personal Safety and Judgment intact   Pain Assessment   Patient Currently in Pain No   Integumentary/Edema   Integumentary/Edema no deficits were identifed   Posture    Posture Protracted shoulders;Forward head position   Range of Motion (ROM)   ROM Comment BLE AROM WFL   Strength   Strength Comments Pt demonstrates 3/5 strength at yamileth hip flexors, 5/5 strength at quadriceps and HS, ankle dorsiflexors 4/5   Bed Mobility   Bed Mobility Comments Pt tx supine<>sit with HOB elevated 30 deg with SBA   Transfer Skills   Transfer Comments Pt tx sit<>stand with 1 UE support at seat surface and SBA   Gait   Gait Comments Pt amb ~10 feet bed>bathroom with CGA and no AD. Pt demonstrates slowed juliette, WBOS, and 1 collision at doorway d/t L visual field cut.   Balance   Balance Comments Pt sat mod I with EOB, unsupported trunk. Pt stood with SBA with WBOS and no LOB   Sensory Examination   Sensory Perception no deficits were identified   General Therapy Interventions   Planned Therapy Interventions balance training;ADL retraining;bed mobility training;neuromuscular re-education;strengthening;transfer training;risk factor education;home program guidelines;progressive activity/exercise   Clinical Impression   Criteria for Skilled Therapeutic Intervention yes, treatment indicated   PT Diagnosis Impaired functional mobility " "  Influenced by the following impairments Decreased proximal hip strength, impaired dynamic stability, decreased endurance   Functional limitations due to impairments independence with bed mobility, transfers, gait, and stairs   Clinical Presentation Evolving/Changing   Clinical Presentation Rationale Pt mobility status and safety impacted by visual, cardiovascular, and musculoskeletal impairments. Anticipate pt will progress towards SBA-IND with mobility using compensatory strategies with continued therapy.   Clinical Decision Making (Complexity) Moderate complexity   Therapy Frequency` daily   Predicted Duration of Therapy Intervention (days/wks) 1 week   Anticipated Equipment Needs at Discharge shower chair;reacher   Anticipated Discharge Disposition Home with Assist;Home with Home Therapy   Risk & Benefits of therapy have been explained Yes   Patient, Family & other staff in agreement with plan of care Yes   [facility] [employer] TM \"6 Clicks\"   2016, Trustees of [facility], under license to [employer].  All rights reserved.   6 Clicks Short Forms Basic Mobility Inpatient Short Form   [facility] AM-PAC  \"6 Clicks\" V.2 Basic Mobility Inpatient Short Form   1. Turning from your back to your side while in a flat bed without using bedrails? 4 - None   2. Moving from lying on your back to sitting on the side of a flat bed without using bedrails? 4 - None   3. Moving to and from a bed to a chair (including a wheelchair)? 3 - A Little   4. Standing up from a chair using your arms (e.g., wheelchair, or bedside chair)? 3 - A Little   5. To walk in hospital room? 3 - A Little   6. Climbing 3-5 steps with a railing? 3 - A Little   Basic Mobility Raw Score (Score out of 24.Lower scores equate to lower levels of function) 20   Total Evaluation Time   Total Evaluation Time (Minutes) 8     "

## 2017-04-02 NOTE — PROGRESS NOTES
[facility]  Neurosurgery Daily ICU Note:          Assessment   Chari Coates is a 41 year old female who is postoperative day #3 from right stealth assisted craniotomy for meningioma resection.           Plan       Follow-up pathology    Post-operative MRI demonstrated right PCA infarct.     Continue decadron - discuss weaning plan    PT/OT - advance activity       Above patient and plan has been discussed with the neurosurgery chief resident.     Please dial * * * [phone] and enter job code [phone] to reach the on-call neurosurgery resident if you have questions.          Subjective     Blurred vision reported but no new objective deficits           Objective   Temp:  [97.8  F (36.6  C)-99.1  F (37.3  C)] 97.9  F (36.6  C)  Heart Rate:  [] 120  Resp:  [9-30] 16  BP: (116-156)/(41-98) 118/43  SpO2:  [92 %-96 %] 95 %  Resp: 16    I/O last 3 completed shifts:  In: 2526 [P.O.:1320; I.V.:1206]  Out: 1725 [Urine:1725]    Physical Exam  General: NAD, lying comfortably in bed  Incision: clean, dry, head wrap intact  Neurologic    Mental Status:       -- Awake; Alert; oriented x 3      -- Follows commands       -- Speech fluent, spontaneous. No aphasia or dysarthria.      -- no gaze preference. No apparent hemineglect.    Cranial Nerves:      --  PERRL 3-2mm bilat and brisk, left hemifield cut.       -- extraocular movements intact      -- face symmetrical, tongue midline      -- sensory V1-V3 intact bilaterally    Motor:    Delt Bi Tri WE WF    R 5 5 5 5 5 5   L 5 5 5 5 5 5    IP Quad Ham DF PF EHL   R 5 5 5 5 5 5   L 5 5 5 5 5 5     Sensory: symmetrically intact to light touch x4 extremities.       Labs and Imaging   BMP    Recent Labs  Lab 03/31/17  2232 03/31/17  0409 03/30/17  1714 03/30/17  1504    139 144 142   POTASSIUM 4.0 4.2 4.8 4.5   CHLORIDE 112* 109 114*  --    CO2 21 22 22  --    BUN 6* 5* 6*  --    CR 0.57 0.60 0.68  --    AUREA 8.3* 8.2* 8.3*  --      CBC    Recent Labs  Lab  04/01/17  1003 04/01/17  0428 03/31/17  1934 03/31/17  0409   WBC 16.3* 16.2* 15.4* 11.7*   HGB 10.0* 9.0* 7.9* 8.6*   * 140* 128* 129*     COAGS    Recent Labs  Lab 03/31/17  0409 03/30/17  1714 03/30/17  0545 03/29/17  1736   INR 1.23* 1.43* 0.95 0.96   PTT 24 25 24 27   FIBR 219 137*  --   --      ABG    Recent Labs  Lab 03/30/17  1203 03/30/17  1058   PH 7.36 7.37   PCO2 32* 35   PO2 84 167*   HCO3 18* 21

## 2017-04-03 ENCOUNTER — APPOINTMENT (OUTPATIENT)
Dept: OCCUPATIONAL THERAPY | Facility: CLINIC | Age: 42
DRG: 025 | End: 2017-04-03
Attending: NEUROLOGICAL SURGERY
Payer: MEDICARE

## 2017-04-03 ENCOUNTER — APPOINTMENT (OUTPATIENT)
Dept: PHYSICAL THERAPY | Facility: CLINIC | Age: 42
DRG: 025 | End: 2017-04-03
Attending: NEUROLOGICAL SURGERY
Payer: MEDICARE

## 2017-04-03 LAB — COPATH REPORT: NORMAL

## 2017-04-03 PROCEDURE — A9270 NON-COVERED ITEM OR SERVICE: HCPCS | Mod: GY | Performed by: NEUROLOGICAL SURGERY

## 2017-04-03 PROCEDURE — 40000193 ZZH STATISTIC PT WARD VISIT: Performed by: PHYSICAL THERAPIST

## 2017-04-03 PROCEDURE — 25000132 ZZH RX MED GY IP 250 OP 250 PS 637: Mod: GY | Performed by: NEUROLOGICAL SURGERY

## 2017-04-03 PROCEDURE — 25000125 ZZHC RX 250: Performed by: NEUROLOGICAL SURGERY

## 2017-04-03 PROCEDURE — 25000132 ZZH RX MED GY IP 250 OP 250 PS 637: Mod: GY | Performed by: STUDENT IN AN ORGANIZED HEALTH CARE EDUCATION/TRAINING PROGRAM

## 2017-04-03 PROCEDURE — 97116 GAIT TRAINING THERAPY: CPT | Mod: GP | Performed by: PHYSICAL THERAPIST

## 2017-04-03 PROCEDURE — 97530 THERAPEUTIC ACTIVITIES: CPT | Mod: GO | Performed by: OCCUPATIONAL THERAPIST

## 2017-04-03 PROCEDURE — 97535 SELF CARE MNGMENT TRAINING: CPT | Mod: GO | Performed by: OCCUPATIONAL THERAPIST

## 2017-04-03 PROCEDURE — 40000133 ZZH STATISTIC OT WARD VISIT: Performed by: OCCUPATIONAL THERAPIST

## 2017-04-03 PROCEDURE — 25000131 ZZH RX MED GY IP 250 OP 636 PS 637: Mod: GY | Performed by: NEUROLOGICAL SURGERY

## 2017-04-03 PROCEDURE — A9270 NON-COVERED ITEM OR SERVICE: HCPCS | Mod: GY | Performed by: STUDENT IN AN ORGANIZED HEALTH CARE EDUCATION/TRAINING PROGRAM

## 2017-04-03 PROCEDURE — 12000008 ZZH R&B INTERMEDIATE UMMC

## 2017-04-03 RX ORDER — AMOXICILLIN 250 MG
2 CAPSULE ORAL 2 TIMES DAILY
Qty: 30 TABLET | Refills: 0 | Status: SHIPPED | OUTPATIENT
Start: 2017-04-03 | End: 2017-07-14

## 2017-04-03 RX ORDER — PANTOPRAZOLE SODIUM 40 MG/1
40 TABLET, DELAYED RELEASE ORAL EVERY MORNING
Qty: 30 TABLET | Refills: 0 | Status: SHIPPED | OUTPATIENT
Start: 2017-04-03 | End: 2017-07-14

## 2017-04-03 RX ORDER — DEXAMETHASONE 2 MG/1
2 TABLET ORAL EVERY 8 HOURS
Qty: 40 TABLET | Refills: 0 | Status: SHIPPED | OUTPATIENT
Start: 2017-04-03 | End: 2017-07-14

## 2017-04-03 RX ORDER — TRAMADOL HYDROCHLORIDE 50 MG/1
50 TABLET ORAL EVERY 6 HOURS PRN
Qty: 30 TABLET | Refills: 0 | Status: SHIPPED | OUTPATIENT
Start: 2017-04-03 | End: 2017-07-14

## 2017-04-03 RX ORDER — OXYCODONE HYDROCHLORIDE 5 MG/1
5 TABLET ORAL EVERY 4 HOURS PRN
Qty: 45 TABLET | Refills: 0 | Status: SHIPPED | OUTPATIENT
Start: 2017-04-03 | End: 2017-07-14

## 2017-04-03 RX ADMIN — ACETAMINOPHEN 650 MG: 325 TABLET, FILM COATED ORAL at 19:37

## 2017-04-03 RX ADMIN — DIPHENHYDRAMINE HYDROCHLORIDE 25 MG: 25 CAPSULE ORAL at 19:37

## 2017-04-03 RX ADMIN — ALBUTEROL SULFATE 2 PUFF: 90 AEROSOL, METERED RESPIRATORY (INHALATION) at 07:50

## 2017-04-03 RX ADMIN — LINACLOTIDE 145 MCG: 145 CAPSULE, GELATIN COATED ORAL at 07:49

## 2017-04-03 RX ADMIN — ACETAMINOPHEN 650 MG: 325 TABLET, FILM COATED ORAL at 16:43

## 2017-04-03 RX ADMIN — VENLAFAXINE HYDROCHLORIDE 75 MG: 75 TABLET, EXTENDED RELEASE ORAL at 07:50

## 2017-04-03 RX ADMIN — ACETAMINOPHEN 650 MG: 325 TABLET, FILM COATED ORAL at 11:04

## 2017-04-03 RX ADMIN — ALBUTEROL SULFATE 2 PUFF: 90 AEROSOL, METERED RESPIRATORY (INHALATION) at 19:37

## 2017-04-03 RX ADMIN — ONDANSETRON 8 MG: 4 TABLET, ORALLY DISINTEGRATING ORAL at 08:48

## 2017-04-03 RX ADMIN — TRAMADOL HYDROCHLORIDE 50 MG: 50 TABLET, COATED ORAL at 17:34

## 2017-04-03 RX ADMIN — DEXAMETHASONE 4 MG: 4 TABLET ORAL at 07:09

## 2017-04-03 RX ADMIN — ACETAMINOPHEN 650 MG: 325 TABLET, FILM COATED ORAL at 07:09

## 2017-04-03 RX ADMIN — BUDESONIDE AND FORMOTEROL FUMARATE DIHYDRATE 2 PUFF: 80; 4.5 AEROSOL RESPIRATORY (INHALATION) at 19:37

## 2017-04-03 RX ADMIN — ONDANSETRON 8 MG: 4 TABLET, ORALLY DISINTEGRATING ORAL at 20:00

## 2017-04-03 RX ADMIN — METHOCARBAMOL 500 MG: 500 TABLET ORAL at 12:01

## 2017-04-03 RX ADMIN — BUDESONIDE AND FORMOTEROL FUMARATE DIHYDRATE 2 PUFF: 80; 4.5 AEROSOL RESPIRATORY (INHALATION) at 07:50

## 2017-04-03 RX ADMIN — METHOCARBAMOL 500 MG: 500 TABLET ORAL at 07:50

## 2017-04-03 RX ADMIN — DEXAMETHASONE 4 MG: 4 TABLET ORAL at 13:41

## 2017-04-03 RX ADMIN — PANTOPRAZOLE SODIUM 40 MG: 40 TABLET, DELAYED RELEASE ORAL at 07:49

## 2017-04-03 ASSESSMENT — VISUAL ACUITY
OU: NORMAL ACUITY

## 2017-04-03 NOTE — DISCHARGE SUMMARY
[facility] Discharge Summary and Instructions    Chari Coates MRN# [id number]   Age: 41 year old YOB: 1975     Date of Admission:  3/30/2017  Date of Discharge::  4/4/2017  Admitting Physician:  Ángel Lau MD  Discharge Physician:  Ángel Lau MD          Admission Diagnoses:   Meningioma  Meningioma (H)          Discharge Diagnosis:   Meningioma  Meningioma (H)          Procedures:   Stealth-assisted craniotomy right side for meningioma resection.            Brief History of Illness:   Ms. Chari Coates is a 41-year-old female who was seen in Neurosurgery Clinic with multiple meningiomas. 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 She was found to have intermittent blurry vision which is what brought her to our attention. On her imaging is a right-sided tentorial meningioma, which was large enough to require operative intervention.            Hospital Course:   Following surgery the patient did quite well.  Post operative MRI did reveal a right PCA infarct and she did note a left temporal visual field cut, otherwise was neurologically intact.  The patient was followed by therapies who felt she was safe to discharge home with family and outpatient PT, OT.   Prior to discharge the patient was ambulating safely, medically stable, tolerating diet, passing bowel movements and voiding independently.  The patient stated her frontal headache was tolerable and controlled with oral pain meds.  The patient denied nausea, vomiting, diplopia, confusion.    The patient may have her sutures removed by her PCP in [place], [place] in 2 weeks.  Patient will f/u with Dr Ángel Lau in 6 weeks.   Patient to be discharged on a decadron taper.        Final pathology did reveal Meningioma, WHO grade I.            Discharge Medications:     Current Discharge Medication List      START taking these medications    Details   oxyCODONE (ROXICODONE) 5 MG  IR tablet 1 tablet (5 mg) by Oral or Feeding Tube route every 4 hours as needed for moderate to severe pain  Qty: 45 tablet, Refills: 0    Associated Diagnoses: S/P craniotomy      traMADol (ULTRAM) 50 MG tablet Take 1 tablet (50 mg) by mouth every 6 hours as needed  Qty: 30 tablet, Refills: 0    Associated Diagnoses: S/P craniotomy      dexamethasone (DECADRON) 2 MG tablet Take 1 tablet (2 mg) by mouth every 8 hours  Qty: 40 tablet, Refills: 0    Comments: 4 mg every 8 hours for 3 days, 4 mg every 12 hours for 3 days, 2 mg every 12 hours for 3 days, 2 mg every day for 3 days then stop  Associated Diagnoses: S/P craniotomy      senna-docusate (SENOKOT-S;PERICOLACE) 8.6-50 MG per tablet Take 2 tablets by mouth 2 times daily  Qty: 30 tablet, Refills: 0    Associated Diagnoses: S/P craniotomy      pantoprazole (PROTONIX) 40 MG EC tablet Take 1 tablet (40 mg) by mouth every morning  Qty: 30 tablet, Refills: 0    Associated Diagnoses: S/P craniotomy         CONTINUE these medications which have NOT CHANGED    Details   diphenhydrAMINE (BENADRYL) 25 MG capsule Take by mouth as needed      Cholecalciferol (VITAMIN D PO) Take 50,000 Units by mouth twice a week      Ondansetron HCl (ZOFRAN PO) Take by mouth as needed Pt. Unaware of strength-- New Med       methocarbamol (ROBAXIN) 500 MG tablet Take by mouth 4 times daily as needed for muscle spasms      Linaclotide (LINZESS PO) Take 145 mcg by mouth every morning (before breakfast)      medroxyPROGESTERone (DEPO-PROVERA) 150 MG/ML injection Inject 150 mg into the muscle every 3 months      venlafaxine (EFFEXOR-XR) 75 MG 24 hr capsule Take 75 mg by mouth every morning       Budesonide-Formoterol Fumarate (SYMBICORT IN) Inhale into the lungs 2 times daily       albuterol (PROAIR HFA/PROVENTIL HFA/VENTOLIN HFA) 108 (90 BASE) MCG/ACT Inhaler Inhale 2 puffs into the lungs 2 times daily       GABAPENTIN PO Take 300 mg by mouth At Bedtime              Physical Exam  General: NAD,  lying comfortably in bed, incision appears well-healing with sutures in place   Neurologic    Mental Status:   -- Awake; Alert; oriented x 3  -- Follows commands   -- Speech fluent, spontaneous. No aphasia or dysarthria.  -- no gaze preference. No apparent hemineglect.    Cranial Nerves:  -- PERRL 3-2mm bilat and brisk, left hemifield cut.   -- extraocular movements intact  -- face symmetrical, tongue midline  -- sensory V1-V3 intact bilaterally    Motor:     Delt Bi Tri WE WF    R 5 5 5 5 5 5   L 5 5 5 5 5 5     IP Quad Ham DF PF EHL   R 5 5 5 5 5 5   L 5 5 5 5 5 5     Sensory: symmetrically intact to light touch x4 extremities.                Discharge Instructions and Follow-Up:   Discharge diet: Regular   Discharge activity: You may advance activity as tolerated. No strenuous exercise or heay lifting greater than 10 lbs for 4 weeks or until seen and cleared in clinic.   Discharge follow-up: Follow-up with Dr. Ángel Lau MD in 6 weeks  Follow up with PCP in 2 weeks for wound check    Wound care: Ok to shower,however no scrubbing of the wound and no soaking of the wound, meaning no bathtubs or swimming pools. Pat dry only. Leave wound open to air.  Sutures are not absorbable and need to be removed in 2 weeks. If patient still at rehab by this time, the sutures may be removed by the rehab physician if he or she considers that the wound has healed completely.     Please call if you have:  1. increased pain, redness, drainage, swelling at your incision  2. fevers > 101.5 F degrees  3. with any questions or concerns.  You may reach the Neurosurgery clinic at [phone] during regular work hours. ER at [phone].    and ask for the Neurosurgery Resident on call at [phone], during off hours or weekends.         Discharge Disposition:   Discharged to home

## 2017-04-03 NOTE — OP NOTE
DATE OF OPERATION:  03/30/2017      PREOPERATIVE DIAGNOSIS:  Meningioma.      POSTOPERATIVE DIAGNOSIS:  Meningioma.      PROCEDURES:      1.     Stealth-assisted craniotomy right side for meningioma resection.   2.     Use of intra operative microscope  3.     Use of cavitron ultrasonic aspirator  4.     Frameless stereotactic image guidance     SURGEON:  Ángel Lau MD      :  Kaitlin Cardenas MD, Hay Frazier MD.      PREOPERATIVE INDICATIONS:  Ms. Chari Coates is a 41-year-old female who was seen in Neurosurgery Clinic with multiple meningiomas.  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  She was found to have intermittent blurry vision which is what brought her to our attention.  On her imaging is a right-sided tentorial meningioma, which was large enough to require operative intervention.      OPERATIVE COURSE:  Ms. Coates was brought to the operating room and general endotracheal anesthesia was induced.  The bed was turned 180 degrees and she was positioned in the Arreola head esquivel, positioned in left lateral decubitus position with the right occipital area parallel to the foramen.  All pressure points were padded and checked.  A linear incision was marked from the cranial to caudal in the right occipital region.  This was marked out using the Stealth system which had been registered earlier with good accuracy.  The scalp was shaved, prepped and draped in the usual sterile fashion.  The skin was incised with a 10 blade.  Monopolar cautery was used to dissect down to the skull and cerebellar retractors were used with good retraction and the craniotomy was completed by making 2 bur holes along the sinus and 1 more superiorly.  The footplate then used to complete the craniotomy after the dura was stripped.  The craniotomy was extended more superiorly towards the sinus using 5 mm cutting bur.  The dura was then opened in a curvilinear fashion and  flapped downwards towards the transverse sinus.  The Stealth was used to find a gyrus to find the best approach the tumor and a transcortical approach was used.  The kassandra was cut with bipolar cautery and then the cortex was incised with the microscissors.  The bipolar and suction were used to approach the tumor and once the tumor was found, the cup forceps was then used to obtain a sample for frozen.  The tumor was found to be extremely bloody.  The bipolar was turned up to a setting of 50 to allow us to gain better hemostasis.  The CUSA was used intermittently and then hemostasis was achieved.  We were able to identify the tentorium and the falx.  There were large vessels posteriorly but they appeared to be within the tumor itself.  These were bipolared and cut.  Residual tumor on the tentorium was bipolared; however, due to the minute amount of it and due to the bloodiness and due to the dura that involved it was not able to be cut out due to the sinuses within the area and for hemostasis reasons.  The area was irrigated with antibiotic infused saline and the dura was closed with 4-0 Nurolon.  Epidural tack ups were created and used.  The bone flap was replaced with the Synthes plating system and the scalp was closed using 2-0 Vicryl and skin with 3-0 nylon.  A sterile dressing was placed.  The patient was turned supine and removed from the Twisp headholder and extubated in stable neurological condition.      Counts were correct.      Dr. Morales was immediately available for the entire procedure and present for all critical portions.         LORI MORALES MD       As dictated by JOSE EDUARDO HERNANDEZ MD            D: 2017 09:22   T: 2017 10:04   MT: TERESA      Name:     RICK VERDUGO   MRN:      [id number]        Account:        [id number]   :      1975           Procedure Date: 2017      Document: [id number]      I agree with the operative report as documented in the resident's  note.    I was present for the entire procedure with the exception of scalp opening and closure, for which I was immediately available.

## 2017-04-03 NOTE — PROGRESS NOTES
[facility]  Neurosurgery Daily ICU Note:          Assessment   Chari Coates is a 41 year old female who is postoperative day #4 from right stealth assisted craniotomy for meningioma resection.           Plan       Follow-up pathology    Post-operative MRI demonstrated right PCA infarct.     Continue decadron - discuss weaning plan    PTA ihalers, linaclotide, effexor    Continue Robaxin 500     PT/OT - advance activity       Above patient and plan has been discussed with the neurosurgery chief resident.     Please dial * * * [phone] and enter job code [phone] to reach the on-call neurosurgery resident if you have questions.          Subjective     Blurred vision reported but no new objective deficits           Objective   Temp:  [97.8  F (36.6  C)-98.9  F (37.2  C)] 98.4  F (36.9  C)  Pulse:  [] 95  Heart Rate:  [] 97  Resp:  [16-24] 18  BP: (118-156)/(43-79) 123/63  SpO2:  [92 %-97 %] 97 %  Resp: 18    I/O last 3 completed shifts:  In: 1380 [P.O.:1380]  Out: 1050 [Urine:1050]    Physical Exam  General: NAD, lying comfortably in bed, incision appears well-healing with sutures in place   Neurologic    Mental Status:       -- Awake; Alert; oriented x 3      -- Follows commands       -- Speech fluent, spontaneous. No aphasia or dysarthria.      -- no gaze preference. No apparent hemineglect.    Cranial Nerves:      --  PERRL 3-2mm bilat and brisk, left hemifield cut.       -- extraocular movements intact      -- face symmetrical, tongue midline      -- sensory V1-V3 intact bilaterally    Motor:    Delt Bi Tri WE WF    R 5 5 5 5 5 5   L 5 5 5 5 5 5    IP Quad Ham DF PF EHL   R 5 5 5 5 5 5   L 5 5 5 5 5 5     Sensory: symmetrically intact to light touch x4 extremities.       Labs and Imaging   BMP    Recent Labs  Lab 03/31/17  2232 03/31/17  0409 03/30/17  1714 03/30/17  1504    139 144 142   POTASSIUM 4.0 4.2 4.8 4.5   CHLORIDE 112* 109 114*  --    CO2 21 22 22  --    BUN 6* 5*  6*  --    CR 0.57 0.60 0.68  --    AUREA 8.3* 8.2* 8.3*  --      CBC    Recent Labs  Lab 04/01/17  1003 04/01/17  0428 03/31/17  1934 03/31/17  0409   WBC 16.3* 16.2* 15.4* 11.7*   HGB 10.0* 9.0* 7.9* 8.6*   * 140* 128* 129*     COAGS    Recent Labs  Lab 03/31/17  0409 03/30/17  1714 03/30/17  0545 03/29/17  1736   INR 1.23* 1.43* 0.95 0.96   PTT 24 25 24 27   FIBR 219 137*  --   --      ABG    Recent Labs  Lab 03/30/17  1203 03/30/17  1058   PH 7.36 7.37   PCO2 32* 35   PO2 84 167*   HCO3 18* 21

## 2017-04-04 ENCOUNTER — CARE COORDINATION (OUTPATIENT)
Dept: CARDIOLOGY | Facility: CLINIC | Age: 42
End: 2017-04-04

## 2017-04-04 ENCOUNTER — APPOINTMENT (OUTPATIENT)
Dept: PHYSICAL THERAPY | Facility: CLINIC | Age: 42
DRG: 025 | End: 2017-04-04
Attending: NEUROLOGICAL SURGERY
Payer: MEDICARE

## 2017-04-04 VITALS
WEIGHT: 235.01 LBS | RESPIRATION RATE: 18 BRPM | TEMPERATURE: 98.7 F | HEIGHT: 65 IN | SYSTOLIC BLOOD PRESSURE: 149 MMHG | OXYGEN SATURATION: 95 % | DIASTOLIC BLOOD PRESSURE: 75 MMHG | BODY MASS INDEX: 39.16 KG/M2 | HEART RATE: 94 BPM

## 2017-04-04 LAB — INTERPRETATION ECG - MUSE: NORMAL

## 2017-04-04 PROCEDURE — 25000132 ZZH RX MED GY IP 250 OP 250 PS 637: Mod: GY | Performed by: STUDENT IN AN ORGANIZED HEALTH CARE EDUCATION/TRAINING PROGRAM

## 2017-04-04 PROCEDURE — A9270 NON-COVERED ITEM OR SERVICE: HCPCS | Mod: GY | Performed by: STUDENT IN AN ORGANIZED HEALTH CARE EDUCATION/TRAINING PROGRAM

## 2017-04-04 PROCEDURE — 25000131 ZZH RX MED GY IP 250 OP 636 PS 637: Mod: GY | Performed by: NEUROLOGICAL SURGERY

## 2017-04-04 PROCEDURE — 25000132 ZZH RX MED GY IP 250 OP 250 PS 637: Mod: GY | Performed by: NEUROLOGICAL SURGERY

## 2017-04-04 PROCEDURE — A9270 NON-COVERED ITEM OR SERVICE: HCPCS | Mod: GY | Performed by: NEUROLOGICAL SURGERY

## 2017-04-04 PROCEDURE — 40000193 ZZH STATISTIC PT WARD VISIT: Performed by: PHYSICAL THERAPIST

## 2017-04-04 PROCEDURE — 97530 THERAPEUTIC ACTIVITIES: CPT | Mod: GP | Performed by: PHYSICAL THERAPIST

## 2017-04-04 RX ADMIN — ALBUTEROL SULFATE 2 PUFF: 90 AEROSOL, METERED RESPIRATORY (INHALATION) at 07:39

## 2017-04-04 RX ADMIN — ACETAMINOPHEN 650 MG: 325 TABLET, FILM COATED ORAL at 00:11

## 2017-04-04 RX ADMIN — DIPHENHYDRAMINE HYDROCHLORIDE 25 MG: 25 CAPSULE ORAL at 10:08

## 2017-04-04 RX ADMIN — ACETAMINOPHEN 650 MG: 325 TABLET, FILM COATED ORAL at 03:55

## 2017-04-04 RX ADMIN — BUDESONIDE AND FORMOTEROL FUMARATE DIHYDRATE 2 PUFF: 80; 4.5 AEROSOL RESPIRATORY (INHALATION) at 07:39

## 2017-04-04 RX ADMIN — VENLAFAXINE HYDROCHLORIDE 75 MG: 75 TABLET, EXTENDED RELEASE ORAL at 07:38

## 2017-04-04 RX ADMIN — ACETAMINOPHEN 650 MG: 325 TABLET, FILM COATED ORAL at 07:44

## 2017-04-04 RX ADMIN — PANTOPRAZOLE SODIUM 40 MG: 40 TABLET, DELAYED RELEASE ORAL at 07:38

## 2017-04-04 RX ADMIN — DEXAMETHASONE 4 MG: 4 TABLET ORAL at 00:08

## 2017-04-04 ASSESSMENT — VISUAL ACUITY
OU: BLURRED VISION
OU: BLURRED VISION

## 2017-04-04 ASSESSMENT — PAIN DESCRIPTION - DESCRIPTORS: DESCRIPTORS: HEADACHE;DISCOMFORT

## 2017-04-04 NOTE — PROGRESS NOTES
"[facility]  \"Hello, my name is Carleen Zayas , and I am calling from the [facility].  I want to check in and see how you are doing, after leaving the hospital.  You may also receive a call from your Care Coordinator (care team), but I want to make sure you don t have any urgent needs.  I have a couple questions to review with you:     Post-Discharge Outreach                                                    Chari Coates is a 41 year old female     Follow-up Appointments           Adult [facility] Follow-up and recommended labs and tests       Follow up with primary care provider, RAYA WALDRON, in 10-14 days for suture removal      Follow up with Dr Ángel Lau in 6 weeks for follow up- no pre appointment imaging required        Appointments on [place] and/or [facility] (with [facility] or [facility] provider or service). Call [phone] if you haven't heard regarding these appointments within 7 days of discharge.                       Your next 10 appointments already scheduled            May 24, 2017 2:15 PM CDT   Return Visit with Ángel Lau MD   Peds Neurosurgery ([facility])     [facility]  [address] [facility]  [address] [address] [phone]             Care Team:    Patient Care Team       Relationship Specialty Notifications Start End    Raya Waldron PCP - General Family Practice  2/22/17     Phone: [phone] Fax: [phone]         [facility] [address] [facility] [address]    Elías Christian MD, MD Referring Physician   2/22/17     Comment:  REFERRED TO NEUROSURGERY    Phone: [phone] Fax: [phone]         [facility] [address] [facility] [place] [address]    Ángel Lau MD MD Neurosurgery  2/22/17     Phone: [phone] Fax: [phone]         [facility] [address] [facility] [address]            Transition of Care Review                      "                                 Patient was called three times and no answer so post 24 hr DC follow up calls will be closed out, message was left with contact number for department seen by or following up with                          Plan                                                      Thanks for your time.  Your Care Coordinator may follow-up within the next couple days.  In the meantime if you have questions, concerns or problems call your care team.        Carleen Zayas

## 2017-04-05 ENCOUNTER — TELEPHONE (OUTPATIENT)
Dept: NEUROSURGERY | Facility: CLINIC | Age: 42
End: 2017-04-05

## 2017-04-05 NOTE — TELEPHONE ENCOUNTER
Neurosurgery Discharge Coordination Note       Responsible Attending physician: Ángel Lau MD    Operation performed: Craniotomy for meningioma resection    Date of Discharge: 4/4/17    Discharge to: Home     Current status:   Voicemail left with the patient to return a call to me at her convenience.    Discharge instructions and medications reviewed with patient.  Follow up appointments/imaging/testes needed:     RN triage/on call number given: [phone]/ [phone]

## 2017-04-11 DIAGNOSIS — D32.0 CEREBRAL MENINGIOMA (H): Primary | ICD-10-CM

## 2017-06-15 ENCOUNTER — MEDICAL CORRESPONDENCE (OUTPATIENT)
Dept: HEALTH INFORMATION MANAGEMENT | Facility: CLINIC | Age: 42
End: 2017-06-15

## 2017-07-07 ENCOUNTER — TRANSFERRED RECORDS (OUTPATIENT)
Dept: HEALTH INFORMATION MANAGEMENT | Facility: CLINIC | Age: 42
End: 2017-07-07

## 2017-07-14 ENCOUNTER — OFFICE VISIT (OUTPATIENT)
Dept: NEUROSURGERY | Facility: CLINIC | Age: 42
End: 2017-07-14

## 2017-07-14 VITALS
HEIGHT: 65 IN | SYSTOLIC BLOOD PRESSURE: 121 MMHG | HEART RATE: 82 BPM | BODY MASS INDEX: 39.47 KG/M2 | WEIGHT: 236.9 LBS | DIASTOLIC BLOOD PRESSURE: 72 MMHG

## 2017-07-14 DIAGNOSIS — D32.0 CEREBRAL MENINGIOMA (H): Primary | ICD-10-CM

## 2017-07-14 ASSESSMENT — PAIN SCALES - GENERAL: PAINLEVEL: MODERATE PAIN (4)

## 2017-07-14 NOTE — PROGRESS NOTES
CC: Post-op visit for meningiomas s/p craniotomy and resection    HPI: It was a pleasure to see Ms. Coates in Neurosurgery Clinic today for follow up from her meningioma resection on 3/30/2017. She is a 41 year old female with a history of ALL s/p chemo and radiation when she was 5-6 years old. She began to develop headaches and blurry vision sometime within the last 2 years and was found to have multiple meningiomas and was first seen in our clinic in 2/2017. Today, she reports that her headache and vision problems are largely unchanged since before her surgery. Her headache is multifocal and unrelated to position but resolves with Tylenol. The only difference she has noticed is that she now has some photophobia which was not present prior to her surgery. Her blurry vision is for central located objects at both near and far distances. She is otherwise doing well and denies any new complaints or deficits.    PHYSICAL EXAM:   General: female, resting comfortably  Neuro:  - Cranial Nerves: PERRL. On CN II testing she has a left-sided visual field deficit in both eyes. CN III - XII tested and intact.  - Strength: 5/5 in UE/LE bilaterally  - Sensation: intact to light touch bilaterally. No pronator drift  - Reflexes: 1+ biceps, triceps, brachioradialis and patellar bilaterally    IMAGING: She has brought an MRI dated 7/11/2017 for review to her appointment today.     ASSESSMENT: 41 y.o. female with likely radiation-induced meningiomas s/p stealth assisted craniotomy and meningioma resection approximately 4 months post-operatively. She is doing well although exam is significant for left-sided homonymous hemianopsia. We reviewed her imaging which shows complete resection of her right occipital meningioma.     PLAN: She will followup in neurosurgery clinic in 1 year with additional imaging at that time to evaluate for progression of her meningiomas. She will also follow-up with opthalmology this month to evaluate her  visual field deficit. We will upload her MRI from 7/11 for a closer assessment of further progression of her other meningiomas.

## 2017-07-14 NOTE — MR AVS SNAPSHOT
"              After Visit Summary   2017    Chari Coates    MRN: [id number]           Patient Information     Date Of Birth          1975        Visit Information        Provider Department      2017 12:00 PM Ángel Lau MD [facility] Neurosurgery         Follow-ups after your visit        Follow-up notes from your care team     Return in about 1 year (around 2018).      Who to contact     Please call your clinic at [phone] to:    Ask questions about your health    Make or cancel appointments    Discuss your medicines    Learn about your test results    Speak to your doctor   If you have compliments or concerns about an experience at your clinic, or if you wish to file a complaint, please contact [facility] Patient Relations at [phone] or email us at [email or web address]         Additional Information About Your Visit        MyChart Information     [employer] is an electronic gateway that provides easy, online access to your medical records. With [employer], you can request a clinic appointment, read your test results, renew a prescription or communicate with your care team.     To sign up for [employer] visit the website at [email or web address]   You will be asked to enter the access code listed below, as well as some personal information. Please follow the directions to create your username and password.     Your access code is: [email or web address]  Expires: 2017  6:30 AM     Your access code will  in 90 days. If you need help or a new code, please contact your [facility] Clinic or call [phone] for assistance.        Care EveryWhere ID     This is your Care EveryWhere ID. This could be used by other organizations to access your [place] medical records  [id number]        Your Vitals Were     Pulse Height BMI (Body Mass Index)             82 1.651 m (5' 5\") 39.42 kg/m2          Blood Pressure from Last 3 " Encounters:   07/14/17 121/72   04/04/17 149/75   03/29/17 134/80    Weight from Last 3 Encounters:   07/14/17 107.5 kg (236 lb 14.4 oz)   03/30/17 106.6 kg (235 lb 0.2 oz)   03/29/17 106.9 kg (235 lb 9.6 oz)              Today, you had the following     No orders found for display       Primary Care Provider Office Phone # Fax #    Britta Lu [phone]       [facility] [address] [address]        Equal Access to Services     [facility] Hospital[facility] : Hadii heber noland hadasho Sozuleika, waaxda luqadaha, qaybta kaalmada adenatalieyaclovis, callum marti . So [facility] [phone].    ATENCIÓN: Si habla español, tiene a grissom disposición servicios gratuitos de asistencia lingüística. LlSelect Medical [facility], [facility] [phone].    We comply with applicable federal civil rights laws and [place] laws. We do not discriminate on the basis of race, color, national origin, age, disability sex, sexual orientation or gender identity.            Thank you!     Thank you for choosing [facility]  for your care. Our goal is always to provide you with excellent care. Hearing back from our patients is one way we can continue to improve our services. Please take a few minutes to complete the written survey that you may receive in the mail after your visit with us. Thank you!             Your Updated Medication List - Protect others around you: Learn how to safely use, store and throw away your medicines at www.disposemymeds.org.          This list is accurate as of: 7/14/17  1:15 PM.  Always use your most recent med list.                   Brand Name Dispense Instructions for use Diagnosis    albuterol 108 (90 BASE) MCG/ACT Inhaler    PROAIR HFA/PROVENTIL HFA/VENTOLIN HFA     Inhale 2 puffs into the lungs 2 times daily        DEPO-PROVERA 150 MG/ML injection   Generic drug:  medroxyPROGESTERone      Inject 150 mg into the muscle every 3 months        diphenhydrAMINE 25 MG capsule    BENADRYL      Take by mouth as needed        GABAPENTIN PO      Take 300 mg by mouth At Bedtime        LINZESS PO      Take 145 mcg by mouth every morning (before breakfast)        methocarbamol 500 MG tablet    ROBAXIN     Take by mouth 4 times daily as needed for muscle spasms        SYMBICORT IN      Inhale into the lungs 2 times daily        venlafaxine 75 MG 24 hr capsule    EFFEXOR-XR     Take 75 mg by mouth every morning        VITAMIN D PO      Take 50,000 Units by mouth twice a week

## 2017-07-14 NOTE — LETTER
7/14/2017       RE: Chari Coates  [address]  [facility] [address]     Dear Colleague,    Thank you for referring your patient, Chari Coates, to the [facility] NEUROSURGERY at [facility]. Please see a copy of my visit note below.      CC: Post-op visit for meningiomas s/p craniotomy and resection    HPI: It was a pleasure to see Ms. Coates in Neurosurgery Clinic today for follow up from her meningioma resection on 3/30/2017. She is a 41 year old female with a history of ALL s/p chemo and radiation when she was 5-6 years old. She began to develop headaches and blurry vision sometime within the last 2 years and was found to have multiple meningiomas and was first seen in our clinic in 2/2017. Today, she reports that her headache and vision problems are largely unchanged since before her surgery. Her headache is multifocal and unrelated to position but resolves with Tylenol. The only difference she has noticed is that she now has some photophobia which was not present prior to her surgery. Her blurry vision is for central located objects at both near and far distances. She is otherwise doing well and denies any new complaints or deficits.    PHYSICAL EXAM:   General: female, resting comfortably  Neuro:  - Cranial Nerves: PERRL. On CN II testing she has a left-sided visual field deficit in both eyes. CN III - XII tested and intact.  - Strength: 5/5 in UE/LE bilaterally  - Sensation: intact to light touch bilaterally. No pronator drift  - Reflexes: 1+ biceps, triceps, brachioradialis and patellar bilaterally    IMAGING: She has brought an MRI dated 7/11/2017 for review to her appointment today.     ASSESSMENT: 41 y.o. female with likely radiation-induced meningiomas s/p stealth assisted craniotomy and meningioma resection approximately 4 months post-operatively. She is doing well although exam is significant for left-sided homonymous hemianopsia. We reviewed her imaging which shows  complete resection of her right occipital meningioma.     PLAN: She will followup in neurosurgery clinic in 1 year with additional imaging at that time to evaluate for progression of her meningiomas. She will also follow-up with opthalmology this month to evaluate her visual field deficit. We will upload her MRI from 7/11 for a closer assessment of further progression of her other meningiomas.     Again, thank you for allowing me to participate in the care of your patient.      Sincerely,    Ángel Lau MD

## 2017-07-27 ENCOUNTER — DOCUMENTATION ONLY (OUTPATIENT)
Dept: OTHER | Facility: CLINIC | Age: 42
End: 2017-07-27

## 2017-07-27 PROBLEM — Z71.89 ADVANCED DIRECTIVES, COUNSELING/DISCUSSION: Chronic | Status: ACTIVE | Noted: 2017-07-27

## 2018-02-06 ENCOUNTER — TRANSFERRED RECORDS (OUTPATIENT)
Dept: HEALTH INFORMATION MANAGEMENT | Facility: CLINIC | Age: 43
End: 2018-02-06

## 2018-03-22 ENCOUNTER — PRE VISIT (OUTPATIENT)
Dept: NEUROSURGERY | Facility: CLINIC | Age: 43
End: 2018-03-22

## 2018-08-15 ENCOUNTER — PRE VISIT (OUTPATIENT)
Dept: NEUROSURGERY | Facility: CLINIC | Age: 43
End: 2018-08-15

## 2018-08-16 ENCOUNTER — PRE VISIT (OUTPATIENT)
Dept: NEUROSURGERY | Facility: CLINIC | Age: 43
End: 2018-08-16

## 2018-08-22 ENCOUNTER — TELEPHONE (OUTPATIENT)
Dept: NEUROSURGERY | Facility: CLINIC | Age: 43
End: 2018-08-22

## 2018-08-22 NOTE — TELEPHONE ENCOUNTER
Left message to call me back.    Per Dr. Lau, MRI looks good. She can follow up in one year with a brain MRI.

## 2019-09-03 ENCOUNTER — TRANSFERRED RECORDS (OUTPATIENT)
Dept: HEALTH INFORMATION MANAGEMENT | Facility: CLINIC | Age: 44
End: 2019-09-03

## 2019-09-30 ENCOUNTER — PATIENT OUTREACH (OUTPATIENT)
Dept: NEUROSURGERY | Facility: CLINIC | Age: 44
End: 2019-09-30

## 2019-09-30 NOTE — PROGRESS NOTES
MD reviewed the most recent MRI.  It looks good and to have a MRI done in one year.  Message relayed to pt.  Pt expressed understanding of the information given.    Pt stated that Dr. Andi Christian would order and send the MRI in a year.    [phone] - [facility]

## 2020-01-01 ENCOUNTER — TRANSFERRED RECORDS (OUTPATIENT)
Dept: HEALTH INFORMATION MANAGEMENT | Facility: CLINIC | Age: 45
End: 2020-01-01

## 2020-01-01 ENCOUNTER — TELEPHONE (OUTPATIENT)
Dept: NEUROSURGERY | Facility: CLINIC | Age: 45
End: 2020-01-01

## 2020-01-01 ENCOUNTER — DOCUMENTATION ONLY (OUTPATIENT)
Dept: NEUROSURGERY | Facility: CLINIC | Age: 45
End: 2020-01-01

## 2020-01-01 ENCOUNTER — ANESTHESIA (OUTPATIENT)
Dept: SURGERY | Facility: CLINIC | Age: 45
DRG: 026 | End: 2020-01-01
Payer: MEDICARE

## 2020-01-01 ENCOUNTER — PATIENT OUTREACH (OUTPATIENT)
Dept: CARE COORDINATION | Facility: CLINIC | Age: 45
End: 2020-01-01

## 2020-01-01 ENCOUNTER — HOSPITAL ENCOUNTER (INPATIENT)
Facility: CLINIC | Age: 45
LOS: 4 days | Discharge: HOME | DRG: 026 | End: 2020-12-20
Attending: NEUROLOGICAL SURGERY | Admitting: NEUROLOGICAL SURGERY
Payer: MEDICARE

## 2020-01-01 ENCOUNTER — PREP FOR PROCEDURE (OUTPATIENT)
Dept: NEUROSURGERY | Facility: CLINIC | Age: 45
End: 2020-01-01

## 2020-01-01 ENCOUNTER — HOSPITAL ENCOUNTER (INPATIENT)
Facility: CLINIC | Age: 45
Setting detail: SURGERY ADMIT
DRG: 026 | End: 2020-01-01
Attending: NEUROLOGICAL SURGERY | Admitting: NEUROLOGICAL SURGERY
Payer: MEDICARE

## 2020-01-01 ENCOUNTER — APPOINTMENT (OUTPATIENT)
Dept: PHYSICAL THERAPY | Facility: CLINIC | Age: 45
DRG: 026 | End: 2020-01-01
Attending: NURSE PRACTITIONER
Payer: MEDICARE

## 2020-01-01 ENCOUNTER — APPOINTMENT (OUTPATIENT)
Dept: MRI IMAGING | Facility: CLINIC | Age: 45
DRG: 026 | End: 2020-01-01
Attending: STUDENT IN AN ORGANIZED HEALTH CARE EDUCATION/TRAINING PROGRAM
Payer: MEDICARE

## 2020-01-01 ENCOUNTER — APPOINTMENT (OUTPATIENT)
Dept: GENERAL RADIOLOGY | Facility: CLINIC | Age: 45
DRG: 026 | End: 2020-01-01
Attending: ANESTHESIOLOGY
Payer: MEDICARE

## 2020-01-01 ENCOUNTER — ANESTHESIA EVENT (OUTPATIENT)
Dept: SURGERY | Facility: CLINIC | Age: 45
DRG: 026 | End: 2020-01-01
Payer: MEDICARE

## 2020-01-01 ENCOUNTER — APPOINTMENT (OUTPATIENT)
Dept: OCCUPATIONAL THERAPY | Facility: CLINIC | Age: 45
DRG: 026 | End: 2020-01-01
Attending: NEUROLOGICAL SURGERY
Payer: MEDICARE

## 2020-01-01 ENCOUNTER — PREP FOR PROCEDURE (OUTPATIENT)
Dept: NEUROLOGY | Facility: CLINIC | Age: 45
End: 2020-01-01

## 2020-01-01 ENCOUNTER — APPOINTMENT (OUTPATIENT)
Dept: OCCUPATIONAL THERAPY | Facility: CLINIC | Age: 45
DRG: 026 | End: 2020-01-01
Attending: NURSE PRACTITIONER
Payer: MEDICARE

## 2020-01-01 ENCOUNTER — TELEPHONE (OUTPATIENT)
Dept: NEUROLOGY | Facility: CLINIC | Age: 45
End: 2020-01-01

## 2020-01-01 VITALS
BODY MASS INDEX: 42.94 KG/M2 | DIASTOLIC BLOOD PRESSURE: 75 MMHG | HEIGHT: 65 IN | RESPIRATION RATE: 18 BRPM | HEART RATE: 100 BPM | SYSTOLIC BLOOD PRESSURE: 131 MMHG | WEIGHT: 257.72 LBS | TEMPERATURE: 98.1 F | OXYGEN SATURATION: 96 %

## 2020-01-01 DIAGNOSIS — D32.9 MENINGIOMA (H): Primary | ICD-10-CM

## 2020-01-01 DIAGNOSIS — Z11.59 ENCOUNTER FOR SCREENING FOR OTHER VIRAL DISEASES: Primary | ICD-10-CM

## 2020-01-01 DIAGNOSIS — G93.89 BRAIN MASS: ICD-10-CM

## 2020-01-01 DIAGNOSIS — N39.0 URINARY TRACT INFECTION WITHOUT HEMATURIA, SITE UNSPECIFIED: ICD-10-CM

## 2020-01-01 LAB
ABO + RH BLD: NORMAL
ABO + RH BLD: NORMAL
ALBUMIN SERPL-MCNC: 3.7 G/DL (ref 3.4–5)
ALBUMIN UR-MCNC: 10 MG/DL
ALP SERPL-CCNC: 84 U/L (ref 40–150)
ALT SERPL W P-5'-P-CCNC: 30 U/L (ref 0–50)
ALT SERPL-CCNC: 15 U/L (ref 7–43)
ANION GAP SERPL CALCULATED.3IONS-SCNC: 4 MMOL/L (ref 3–14)
ANION GAP SERPL CALCULATED.3IONS-SCNC: 5 MMOL/L (ref 3–14)
ANION GAP SERPL CALCULATED.3IONS-SCNC: 6 MMOL/L (ref 3–14)
ANION GAP SERPL CALCULATED.3IONS-SCNC: 7 MMOL/L (ref 3–14)
ANION GAP SERPL CALCULATED.3IONS-SCNC: 7 MMOL/L (ref 3–14)
APPEARANCE UR: CLEAR
APTT PPP: 26 SEC (ref 22–37)
AST SERPL W P-5'-P-CCNC: 16 U/L (ref 0–45)
AST SERPL-CCNC: 16 U/L (ref 13–39)
BACTERIA #/AREA URNS HPF: ABNORMAL /HPF
BILIRUB SERPL-MCNC: 0.4 MG/DL (ref 0.2–1.3)
BILIRUB UR QL STRIP: NEGATIVE
BLD GP AB SCN SERPL QL: NORMAL
BLD PROD TYP BPU: NORMAL
BLD PROD TYP BPU: NORMAL
BLD UNIT ID BPU: 0
BLOOD BANK CMNT PATIENT-IMP: NORMAL
BLOOD PRODUCT CODE: NORMAL
BPU ID: NORMAL
BUN SERPL-MCNC: 10 MG/DL (ref 7–30)
BUN SERPL-MCNC: 12 MG/DL (ref 7–30)
BUN SERPL-MCNC: 12 MG/DL (ref 7–30)
BUN SERPL-MCNC: 14 MG/DL (ref 7–30)
BUN SERPL-MCNC: 14 MG/DL (ref 7–30)
CALCIUM SERPL-MCNC: 8.3 MG/DL (ref 8.5–10.1)
CALCIUM SERPL-MCNC: 8.5 MG/DL (ref 8.5–10.1)
CALCIUM SERPL-MCNC: 8.6 MG/DL (ref 8.5–10.1)
CALCIUM SERPL-MCNC: 8.7 MG/DL (ref 8.5–10.1)
CALCIUM SERPL-MCNC: 9.1 MG/DL (ref 8.5–10.1)
CHLORIDE SERPL-SCNC: 110 MMOL/L (ref 94–109)
CHLORIDE SERPL-SCNC: 111 MMOL/L (ref 94–109)
CHLORIDE SERPL-SCNC: 112 MMOL/L (ref 94–109)
CHLORIDE SERPL-SCNC: 112 MMOL/L (ref 94–109)
CHLORIDE SERPL-SCNC: 114 MMOL/L (ref 94–109)
CO2 SERPL-SCNC: 22 MMOL/L (ref 20–32)
CO2 SERPL-SCNC: 22 MMOL/L (ref 20–32)
CO2 SERPL-SCNC: 24 MMOL/L (ref 20–32)
CO2 SERPL-SCNC: 25 MMOL/L (ref 20–32)
CO2 SERPL-SCNC: 26 MMOL/L (ref 20–32)
COLOR UR AUTO: YELLOW
COPATH REPORT: NORMAL
CREAT SERPL-MCNC: 0.62 MG/DL (ref 0.52–1.04)
CREAT SERPL-MCNC: 0.62 MG/DL (ref 0.52–1.04)
CREAT SERPL-MCNC: 0.67 MG/DL (ref 0.52–1.04)
CREAT SERPL-MCNC: 0.72 MG/DL (ref 0.52–1.04)
CREAT SERPL-MCNC: 0.77 MG/DL (ref 0.6–1.1)
CREAT SERPL-MCNC: 0.81 MG/DL (ref 0.52–1.04)
ERYTHROCYTE [DISTWIDTH] IN BLOOD BY AUTOMATED COUNT: 12.3 % (ref 10–15)
ERYTHROCYTE [DISTWIDTH] IN BLOOD BY AUTOMATED COUNT: 12.5 % (ref 10–15)
ERYTHROCYTE [DISTWIDTH] IN BLOOD BY AUTOMATED COUNT: 12.8 % (ref 10–15)
ERYTHROCYTE [DISTWIDTH] IN BLOOD BY AUTOMATED COUNT: 12.8 % (ref 10–15)
ERYTHROCYTE [DISTWIDTH] IN BLOOD BY AUTOMATED COUNT: 12.9 % (ref 10–15)
GFR SERPL CREATININE-BSD FRML MDRD: 87 ML/MIN/{1.73_M2}
GFR SERPL CREATININE-BSD FRML MDRD: >90 ML/MIN/{1.73_M2}
GLUCOSE BLDC GLUCOMTR-MCNC: 119 MG/DL (ref 70–99)
GLUCOSE BLDC GLUCOMTR-MCNC: 163 MG/DL (ref 70–99)
GLUCOSE SERPL-MCNC: 108 MG/DL (ref 75–99)
GLUCOSE SERPL-MCNC: 120 MG/DL (ref 70–99)
GLUCOSE SERPL-MCNC: 126 MG/DL (ref 70–99)
GLUCOSE SERPL-MCNC: 130 MG/DL (ref 70–99)
GLUCOSE SERPL-MCNC: 137 MG/DL (ref 70–99)
GLUCOSE SERPL-MCNC: 96 MG/DL (ref 70–99)
GLUCOSE UR STRIP-MCNC: NEGATIVE MG/DL
HBA1C MFR BLD: 5.1 % (ref 0–5.6)
HCT VFR BLD AUTO: 32.4 % (ref 35–47)
HCT VFR BLD AUTO: 34.4 % (ref 35–47)
HCT VFR BLD AUTO: 34.6 % (ref 35–47)
HCT VFR BLD AUTO: 36.6 % (ref 35–47)
HCT VFR BLD AUTO: 41.4 % (ref 35–47)
HGB BLD-MCNC: 10.6 G/DL (ref 11.7–15.7)
HGB BLD-MCNC: 11.3 G/DL (ref 11.7–15.7)
HGB BLD-MCNC: 11.5 G/DL (ref 11.7–15.7)
HGB BLD-MCNC: 12 G/DL (ref 11.7–15.7)
HGB BLD-MCNC: 13.3 G/DL (ref 11.7–15.7)
HGB UR QL STRIP: NEGATIVE
INR PPP: 0.96 (ref 0.86–1.14)
INR PPP: 1 (ref 0.9–1.1)
KETONES UR STRIP-MCNC: NEGATIVE MG/DL
LABORATORY COMMENT REPORT: NORMAL
LACTATE BLD-SCNC: 1.6 MMOL/L (ref 0.7–2)
LEUKOCYTE ESTERASE UR QL STRIP: ABNORMAL
MAGNESIUM SERPL-MCNC: 2 MG/DL (ref 1.6–2.3)
MAGNESIUM SERPL-MCNC: 2.1 MG/DL (ref 1.6–2.3)
MAGNESIUM SERPL-MCNC: 2.5 MG/DL (ref 1.6–2.3)
MCH RBC QN AUTO: 29.9 PG (ref 26.5–33)
MCH RBC QN AUTO: 30.1 PG (ref 26.5–33)
MCH RBC QN AUTO: 30.5 PG (ref 26.5–33)
MCH RBC QN AUTO: 30.8 PG (ref 26.5–33)
MCH RBC QN AUTO: 30.8 PG (ref 26.5–33)
MCHC RBC AUTO-ENTMCNC: 32.1 G/DL (ref 31.5–36.5)
MCHC RBC AUTO-ENTMCNC: 32.7 G/DL (ref 31.5–36.5)
MCHC RBC AUTO-ENTMCNC: 32.8 G/DL (ref 31.5–36.5)
MCHC RBC AUTO-ENTMCNC: 32.8 G/DL (ref 31.5–36.5)
MCHC RBC AUTO-ENTMCNC: 33.2 G/DL (ref 31.5–36.5)
MCV RBC AUTO: 92 FL (ref 78–100)
MCV RBC AUTO: 93 FL (ref 78–100)
MCV RBC AUTO: 93 FL (ref 78–100)
MCV RBC AUTO: 94 FL (ref 78–100)
MCV RBC AUTO: 94 FL (ref 78–100)
MUCOUS THREADS #/AREA URNS LPF: PRESENT /LPF
NITRATE UR QL: NEGATIVE
NUM BPU REQUESTED: 1
PH UR STRIP: 7 PH (ref 5–7)
PHOSPHATE SERPL-MCNC: 1.8 MG/DL (ref 2.5–4.5)
PHOSPHATE SERPL-MCNC: 2.8 MG/DL (ref 2.5–4.5)
PHOSPHATE SERPL-MCNC: 2.9 MG/DL (ref 2.5–4.5)
PHOSPHATE SERPL-MCNC: 3.3 MG/DL (ref 2.5–4.5)
PHOSPHATE SERPL-MCNC: 3.3 MG/DL (ref 2.5–4.5)
PHOSPHATE SERPL-MCNC: 3.6 MG/DL (ref 2.5–4.5)
PLATELET # BLD AUTO: 184 10E9/L (ref 150–450)
PLATELET # BLD AUTO: 206 10E9/L (ref 150–450)
PLATELET # BLD AUTO: 211 10E9/L (ref 150–450)
PLATELET # BLD AUTO: 225 10E9/L (ref 150–450)
PLATELET # BLD AUTO: 244 10E9/L (ref 150–450)
POTASSIUM SERPL-SCNC: 4 MMOL/L (ref 3.4–5.3)
POTASSIUM SERPL-SCNC: 4 MMOL/L (ref 3.4–5.3)
POTASSIUM SERPL-SCNC: 4.1 MMOL/L (ref 3.4–5.3)
POTASSIUM SERPL-SCNC: 4.1 MMOL/L (ref 3.4–5.3)
POTASSIUM SERPL-SCNC: 4.2 MEQ/L (ref 3.5–5.1)
POTASSIUM SERPL-SCNC: 4.2 MMOL/L (ref 3.4–5.3)
PROT SERPL-MCNC: 7.4 G/DL (ref 6.8–8.8)
RBC # BLD AUTO: 3.54 10E12/L (ref 3.8–5.2)
RBC # BLD AUTO: 3.71 10E12/L (ref 3.8–5.2)
RBC # BLD AUTO: 3.73 10E12/L (ref 3.8–5.2)
RBC # BLD AUTO: 3.9 10E12/L (ref 3.8–5.2)
RBC # BLD AUTO: 4.42 10E12/L (ref 3.8–5.2)
RBC #/AREA URNS AUTO: 3 /HPF (ref 0–2)
SARS-COV-2 RNA SPEC QL NAA+PROBE: NEGATIVE
SARS-COV-2 RNA SPEC QL NAA+PROBE: NORMAL
SODIUM SERPL-SCNC: 140 MMOL/L (ref 133–144)
SODIUM SERPL-SCNC: 141 MMOL/L (ref 133–144)
SODIUM SERPL-SCNC: 143 MMOL/L (ref 133–144)
SOURCE: ABNORMAL
SP GR UR STRIP: 1.02 (ref 1–1.03)
SPECIMEN EXP DATE BLD: NORMAL
SPECIMEN SOURCE: NORMAL
SPECIMEN SOURCE: NORMAL
SQUAMOUS #/AREA URNS AUTO: <1 /HPF (ref 0–1)
TRANS CELLS #/AREA URNS HPF: 1 /HPF (ref 0–1)
TRANSFUSION STATUS PATIENT QL: NORMAL
TRANSFUSION STATUS PATIENT QL: NORMAL
UROBILINOGEN UR STRIP-MCNC: 2 MG/DL (ref 0–2)
WBC # BLD AUTO: 11.1 10E9/L (ref 4–11)
WBC # BLD AUTO: 11.5 10E9/L (ref 4–11)
WBC # BLD AUTO: 12.1 10E9/L (ref 4–11)
WBC # BLD AUTO: 13.3 10E9/L (ref 4–11)
WBC # BLD AUTO: 8.8 10E9/L (ref 4–11)
WBC #/AREA URNS AUTO: 65 /HPF (ref 0–5)

## 2020-01-01 PROCEDURE — 83605 ASSAY OF LACTIC ACID: CPT | Performed by: NEUROLOGICAL SURGERY

## 2020-01-01 PROCEDURE — 250N000011 HC RX IP 250 OP 636: Performed by: NEUROLOGICAL SURGERY

## 2020-01-01 PROCEDURE — 250N000003 HC SEVOFLURANE, EA 15 MIN: Performed by: NEUROLOGICAL SURGERY

## 2020-01-01 PROCEDURE — 250N000012 HC RX MED GY IP 250 OP 636 PS 637: Performed by: STUDENT IN AN ORGANIZED HEALTH CARE EDUCATION/TRAINING PROGRAM

## 2020-01-01 PROCEDURE — 83036 HEMOGLOBIN GLYCOSYLATED A1C: CPT | Performed by: STUDENT IN AN ORGANIZED HEALTH CARE EDUCATION/TRAINING PROGRAM

## 2020-01-01 PROCEDURE — 250N000009 HC RX 250: Performed by: NURSE ANESTHETIST, CERTIFIED REGISTERED

## 2020-01-01 PROCEDURE — 97116 GAIT TRAINING THERAPY: CPT | Mod: GP

## 2020-01-01 PROCEDURE — 88341 IMHCHEM/IMCYTCHM EA ADD ANTB: CPT | Mod: TC | Performed by: NEUROLOGICAL SURGERY

## 2020-01-01 PROCEDURE — 97530 THERAPEUTIC ACTIVITIES: CPT | Mod: GO | Performed by: OCCUPATIONAL THERAPIST

## 2020-01-01 PROCEDURE — 120N000002 HC R&B MED SURG/OB UMMC

## 2020-01-01 PROCEDURE — 250N000013 HC RX MED GY IP 250 OP 250 PS 637: Performed by: STUDENT IN AN ORGANIZED HEALTH CARE EDUCATION/TRAINING PROGRAM

## 2020-01-01 PROCEDURE — 85027 COMPLETE CBC AUTOMATED: CPT | Performed by: NEUROLOGICAL SURGERY

## 2020-01-01 PROCEDURE — 761N000005 HC RECOVERY PHASE 1 LEVEL 3 FIRST HR: Performed by: NEUROLOGICAL SURGERY

## 2020-01-01 PROCEDURE — 88307 TISSUE EXAM BY PATHOLOGIST: CPT | Mod: TC | Performed by: NEUROLOGICAL SURGERY

## 2020-01-01 PROCEDURE — 258N000003 HC RX IP 258 OP 636: Performed by: NURSE ANESTHETIST, CERTIFIED REGISTERED

## 2020-01-01 PROCEDURE — 258N000003 HC RX IP 258 OP 636: Performed by: STUDENT IN AN ORGANIZED HEALTH CARE EDUCATION/TRAINING PROGRAM

## 2020-01-01 PROCEDURE — 36415 COLL VENOUS BLD VENIPUNCTURE: CPT | Performed by: NEUROLOGICAL SURGERY

## 2020-01-01 PROCEDURE — 999N000063 XR CHEST PORT 1 VW

## 2020-01-01 PROCEDURE — 250N000009 HC RX 250: Performed by: NEUROLOGICAL SURGERY

## 2020-01-01 PROCEDURE — 999N000015 HC STATISTIC ARTERIAL MONITORING DAILY

## 2020-01-01 PROCEDURE — 61781 SCAN PROC CRANIAL INTRA: CPT | Performed by: NEUROLOGICAL SURGERY

## 2020-01-01 PROCEDURE — 80048 BASIC METABOLIC PNL TOTAL CA: CPT | Performed by: NEUROLOGICAL SURGERY

## 2020-01-01 PROCEDURE — 88307 TISSUE EXAM BY PATHOLOGIST: CPT | Mod: 26 | Performed by: SPECIALIST

## 2020-01-01 PROCEDURE — 85730 THROMBOPLASTIN TIME PARTIAL: CPT | Performed by: STUDENT IN AN ORGANIZED HEALTH CARE EDUCATION/TRAINING PROGRAM

## 2020-01-01 PROCEDURE — 272N000001 HC OR GENERAL SUPPLY STERILE: Performed by: NEUROLOGICAL SURGERY

## 2020-01-01 PROCEDURE — 70552 MRI BRAIN STEM W/DYE: CPT | Mod: 26 | Performed by: RADIOLOGY

## 2020-01-01 PROCEDURE — 250N000011 HC RX IP 250 OP 636: Performed by: NURSE PRACTITIONER

## 2020-01-01 PROCEDURE — U0003 INFECTIOUS AGENT DETECTION BY NUCLEIC ACID (DNA OR RNA); SEVERE ACUTE RESPIRATORY SYNDROME CORONAVIRUS 2 (SARS-COV-2) (CORONAVIRUS DISEASE [COVID-19]), AMPLIFIED PROBE TECHNIQUE, MAKING USE OF HIGH THROUGHPUT TECHNOLOGIES AS DESCRIBED BY CMS-2020-01-R: HCPCS | Performed by: STUDENT IN AN ORGANIZED HEALTH CARE EDUCATION/TRAINING PROGRAM

## 2020-01-01 PROCEDURE — 360N000040 HC SURGERY LEVEL 6 1ST 30 MIN - UMMC: Performed by: NEUROLOGICAL SURGERY

## 2020-01-01 PROCEDURE — 84100 ASSAY OF PHOSPHORUS: CPT | Performed by: STUDENT IN AN ORGANIZED HEALTH CARE EDUCATION/TRAINING PROGRAM

## 2020-01-01 PROCEDURE — 255N000002 HC RX 255 OP 636: Performed by: NEUROLOGICAL SURGERY

## 2020-01-01 PROCEDURE — 71045 X-RAY EXAM CHEST 1 VIEW: CPT | Mod: 26 | Performed by: RADIOLOGY

## 2020-01-01 PROCEDURE — 80053 COMPREHEN METABOLIC PANEL: CPT | Performed by: STUDENT IN AN ORGANIZED HEALTH CARE EDUCATION/TRAINING PROGRAM

## 2020-01-01 PROCEDURE — 370N000002 HC ANESTHESIA TECHNICAL FEE, EACH ADDTL 15 MIN: Performed by: NEUROLOGICAL SURGERY

## 2020-01-01 PROCEDURE — 97165 OT EVAL LOW COMPLEX 30 MIN: CPT | Mod: GO

## 2020-01-01 PROCEDURE — 250N000011 HC RX IP 250 OP 636: Performed by: STUDENT IN AN ORGANIZED HEALTH CARE EDUCATION/TRAINING PROGRAM

## 2020-01-01 PROCEDURE — 70552 MRI BRAIN STEM W/DYE: CPT

## 2020-01-01 PROCEDURE — 61512 CRNEC TREPH EXC MNGIOMA STTL: CPT | Mod: GC | Performed by: NEUROLOGICAL SURGERY

## 2020-01-01 PROCEDURE — 70553 MRI BRAIN STEM W/O & W/DYE: CPT | Mod: 26 | Performed by: STUDENT IN AN ORGANIZED HEALTH CARE EDUCATION/TRAINING PROGRAM

## 2020-01-01 PROCEDURE — 272N000004 HC RX 272: Performed by: NEUROLOGICAL SURGERY

## 2020-01-01 PROCEDURE — 999N000157 HC STATISTIC RCP TIME EA 10 MIN

## 2020-01-01 PROCEDURE — 999N000128 HC STATISTIC PERIPHERAL IV START W/O US GUIDANCE

## 2020-01-01 PROCEDURE — 85610 PROTHROMBIN TIME: CPT | Performed by: STUDENT IN AN ORGANIZED HEALTH CARE EDUCATION/TRAINING PROGRAM

## 2020-01-01 PROCEDURE — 83735 ASSAY OF MAGNESIUM: CPT | Performed by: NEUROLOGICAL SURGERY

## 2020-01-01 PROCEDURE — A9585 GADOBUTROL INJECTION: HCPCS | Performed by: NEUROLOGICAL SURGERY

## 2020-01-01 PROCEDURE — 250N000011 HC RX IP 250 OP 636: Performed by: NURSE ANESTHETIST, CERTIFIED REGISTERED

## 2020-01-01 PROCEDURE — 250N000009 HC RX 250: Performed by: STUDENT IN AN ORGANIZED HEALTH CARE EDUCATION/TRAINING PROGRAM

## 2020-01-01 PROCEDURE — 761N000006 HC RECOVERY PHASE 1 LEVEL 3 EA ADDTL HR: Performed by: NEUROLOGICAL SURGERY

## 2020-01-01 PROCEDURE — 88342 IMHCHEM/IMCYTCHM 1ST ANTB: CPT | Mod: 26 | Performed by: SPECIALIST

## 2020-01-01 PROCEDURE — 81001 URINALYSIS AUTO W/SCOPE: CPT | Performed by: STUDENT IN AN ORGANIZED HEALTH CARE EDUCATION/TRAINING PROGRAM

## 2020-01-01 PROCEDURE — 86850 RBC ANTIBODY SCREEN: CPT | Performed by: STUDENT IN AN ORGANIZED HEALTH CARE EDUCATION/TRAINING PROGRAM

## 2020-01-01 PROCEDURE — 255N000002 HC RX 255 OP 636: Performed by: RADIOLOGY

## 2020-01-01 PROCEDURE — 84100 ASSAY OF PHOSPHORUS: CPT | Performed by: NEUROLOGICAL SURGERY

## 2020-01-01 PROCEDURE — 250N000013 HC RX MED GY IP 250 OP 250 PS 637: Performed by: NEUROLOGICAL SURGERY

## 2020-01-01 PROCEDURE — 360N000041 HC SURGERY LEVEL 6 EA 15 ADDTL MIN - UMMC: Performed by: NEUROLOGICAL SURGERY

## 2020-01-01 PROCEDURE — 86923 COMPATIBILITY TEST ELECTRIC: CPT | Performed by: STUDENT IN AN ORGANIZED HEALTH CARE EDUCATION/TRAINING PROGRAM

## 2020-01-01 PROCEDURE — C1763 CONN TISS, NON-HUMAN: HCPCS | Performed by: NEUROLOGICAL SURGERY

## 2020-01-01 PROCEDURE — 86900 BLOOD TYPING SEROLOGIC ABO: CPT | Performed by: STUDENT IN AN ORGANIZED HEALTH CARE EDUCATION/TRAINING PROGRAM

## 2020-01-01 PROCEDURE — 88342 IMHCHEM/IMCYTCHM 1ST ANTB: CPT | Mod: TC | Performed by: NEUROLOGICAL SURGERY

## 2020-01-01 PROCEDURE — 200N000002 HC R&B ICU UMMC

## 2020-01-01 PROCEDURE — 999N000139 HC STATISTIC PRE-PROCEDURE ASSESSMENT II: Performed by: NEUROLOGICAL SURGERY

## 2020-01-01 PROCEDURE — C1713 ANCHOR/SCREW BN/BN,TIS/BN: HCPCS | Performed by: NEUROLOGICAL SURGERY

## 2020-01-01 PROCEDURE — A9585 GADOBUTROL INJECTION: HCPCS | Performed by: RADIOLOGY

## 2020-01-01 PROCEDURE — 97535 SELF CARE MNGMENT TRAINING: CPT | Mod: GO

## 2020-01-01 PROCEDURE — 250N000011 HC RX IP 250 OP 636: Performed by: ANESTHESIOLOGY

## 2020-01-01 PROCEDURE — 88341 IMHCHEM/IMCYTCHM EA ADD ANTB: CPT | Mod: 26 | Performed by: SPECIALIST

## 2020-01-01 PROCEDURE — 85027 COMPLETE CBC AUTOMATED: CPT | Performed by: STUDENT IN AN ORGANIZED HEALTH CARE EDUCATION/TRAINING PROGRAM

## 2020-01-01 PROCEDURE — 86901 BLOOD TYPING SEROLOGIC RH(D): CPT | Performed by: STUDENT IN AN ORGANIZED HEALTH CARE EDUCATION/TRAINING PROGRAM

## 2020-01-01 PROCEDURE — 36415 COLL VENOUS BLD VENIPUNCTURE: CPT | Performed by: STUDENT IN AN ORGANIZED HEALTH CARE EDUCATION/TRAINING PROGRAM

## 2020-01-01 PROCEDURE — 00B00ZZ EXCISION OF BRAIN, OPEN APPROACH: ICD-10-PCS | Performed by: NEUROLOGICAL SURGERY

## 2020-01-01 PROCEDURE — 370N000001 HC ANESTHESIA TECHNICAL FEE, 1ST 30 MIN: Performed by: NEUROLOGICAL SURGERY

## 2020-01-01 PROCEDURE — 70553 MRI BRAIN STEM W/O & W/DYE: CPT

## 2020-01-01 PROCEDURE — 97161 PT EVAL LOW COMPLEX 20 MIN: CPT | Mod: GP

## 2020-01-01 PROCEDURE — 8E09XBH COMPUTER ASSISTED PROCEDURE OF HEAD AND NECK REGION, WITH MAGNETIC RESONANCE IMAGING: ICD-10-PCS | Performed by: NEUROLOGICAL SURGERY

## 2020-01-01 PROCEDURE — 83735 ASSAY OF MAGNESIUM: CPT | Performed by: STUDENT IN AN ORGANIZED HEALTH CARE EDUCATION/TRAINING PROGRAM

## 2020-01-01 PROCEDURE — 97535 SELF CARE MNGMENT TRAINING: CPT | Mod: GO | Performed by: OCCUPATIONAL THERAPIST

## 2020-01-01 PROCEDURE — 999N001017 HC STATISTIC GLUCOSE BY METER IP

## 2020-01-01 PROCEDURE — 69990 MICROSURGERY ADD-ON: CPT | Mod: 59 | Performed by: NEUROLOGICAL SURGERY

## 2020-01-01 DEVICE — GRAFT DURAGEN 2X2" ID220: Type: IMPLANTABLE DEVICE | Site: SKULL | Status: FUNCTIONAL

## 2020-01-01 DEVICE — IMP PLATE SYN STR DOG BONE LOW PROFILE 2H TI 421.502: Type: IMPLANTABLE DEVICE | Site: CRANIAL | Status: FUNCTIONAL

## 2020-01-01 DEVICE — IMP PLATE SYN BOX LOW PROFILE 04H TI 421.511: Type: IMPLANTABLE DEVICE | Site: CRANIAL | Status: FUNCTIONAL

## 2020-01-01 DEVICE — IMP SCR SYN MATRIX LOW PRO 1.5X04MM SELF DRILL 04.503.104.01: Type: IMPLANTABLE DEVICE | Site: CRANIAL | Status: FUNCTIONAL

## 2020-01-01 RX ORDER — FENTANYL CITRATE 50 UG/ML
INJECTION, SOLUTION INTRAMUSCULAR; INTRAVENOUS PRN
Status: DISCONTINUED | OUTPATIENT
Start: 2020-01-01 | End: 2020-01-01

## 2020-01-01 RX ORDER — GADOBUTROL 604.72 MG/ML
10 INJECTION INTRAVENOUS ONCE
Status: COMPLETED | OUTPATIENT
Start: 2020-01-01 | End: 2020-01-01

## 2020-01-01 RX ORDER — LIDOCAINE 40 MG/G
CREAM TOPICAL
Status: DISCONTINUED | OUTPATIENT
Start: 2020-01-01 | End: 2020-01-01 | Stop reason: HOSPADM

## 2020-01-01 RX ORDER — ONDANSETRON 2 MG/ML
4-8 INJECTION INTRAMUSCULAR; INTRAVENOUS EVERY 6 HOURS PRN
Status: DISCONTINUED | OUTPATIENT
Start: 2020-01-01 | End: 2020-01-01

## 2020-01-01 RX ORDER — ONDANSETRON 4 MG/1
4 TABLET, ORALLY DISINTEGRATING ORAL EVERY 30 MIN PRN
Status: DISCONTINUED | OUTPATIENT
Start: 2020-01-01 | End: 2020-01-01 | Stop reason: HOSPADM

## 2020-01-01 RX ORDER — MAGNESIUM SULFATE HEPTAHYDRATE 40 MG/ML
2 INJECTION, SOLUTION INTRAVENOUS ONCE
Status: COMPLETED | OUTPATIENT
Start: 2020-01-01 | End: 2020-01-01

## 2020-01-01 RX ORDER — LABETALOL HYDROCHLORIDE 5 MG/ML
10-40 INJECTION, SOLUTION INTRAVENOUS EVERY 10 MIN PRN
Status: DISCONTINUED | OUTPATIENT
Start: 2020-01-01 | End: 2020-01-01

## 2020-01-01 RX ORDER — LORAZEPAM 2 MG/ML
1 INJECTION INTRAMUSCULAR ONCE
Status: CANCELLED | OUTPATIENT
Start: 2020-01-01

## 2020-01-01 RX ORDER — ONDANSETRON 2 MG/ML
4 INJECTION INTRAMUSCULAR; INTRAVENOUS EVERY 6 HOURS PRN
Status: DISCONTINUED | OUTPATIENT
Start: 2020-01-01 | End: 2020-01-01 | Stop reason: HOSPADM

## 2020-01-01 RX ORDER — LORAZEPAM 2 MG/ML
2 INJECTION INTRAMUSCULAR ONCE
Status: COMPLETED | OUTPATIENT
Start: 2020-01-01 | End: 2020-01-01

## 2020-01-01 RX ORDER — GABAPENTIN 100 MG/1
100 CAPSULE ORAL 2 TIMES DAILY
Status: DISCONTINUED | OUTPATIENT
Start: 2020-01-01 | End: 2020-01-01 | Stop reason: HOSPADM

## 2020-01-01 RX ORDER — HYDRALAZINE HYDROCHLORIDE 20 MG/ML
2.5-5 INJECTION INTRAMUSCULAR; INTRAVENOUS EVERY 10 MIN PRN
Status: DISCONTINUED | OUTPATIENT
Start: 2020-01-01 | End: 2020-01-01 | Stop reason: HOSPADM

## 2020-01-01 RX ORDER — LORAZEPAM 2 MG/ML
1 INJECTION INTRAMUSCULAR ONCE
Status: COMPLETED | OUTPATIENT
Start: 2020-01-01 | End: 2020-01-01

## 2020-01-01 RX ORDER — ACETAMINOPHEN 325 MG/1
975 TABLET ORAL EVERY 8 HOURS
Status: DISPENSED | OUTPATIENT
Start: 2020-01-01 | End: 2020-01-01

## 2020-01-01 RX ORDER — SODIUM CHLORIDE 9 MG/ML
INJECTION, SOLUTION INTRAVENOUS CONTINUOUS
Status: DISCONTINUED | OUTPATIENT
Start: 2020-01-01 | End: 2020-01-01

## 2020-01-01 RX ORDER — HYDRALAZINE HYDROCHLORIDE 20 MG/ML
10-20 INJECTION INTRAMUSCULAR; INTRAVENOUS EVERY 4 HOURS PRN
Status: DISCONTINUED | OUTPATIENT
Start: 2020-01-01 | End: 2020-01-01 | Stop reason: HOSPADM

## 2020-01-01 RX ORDER — ACETAMINOPHEN 325 MG/1
650 TABLET ORAL ONCE
Status: COMPLETED | OUTPATIENT
Start: 2020-01-01 | End: 2020-01-01

## 2020-01-01 RX ORDER — MEPERIDINE HYDROCHLORIDE 25 MG/ML
12.5 INJECTION INTRAMUSCULAR; INTRAVENOUS; SUBCUTANEOUS
Status: DISCONTINUED | OUTPATIENT
Start: 2020-01-01 | End: 2020-01-01 | Stop reason: HOSPADM

## 2020-01-01 RX ORDER — AMOXICILLIN 250 MG
2 CAPSULE ORAL 2 TIMES DAILY
Status: DISCONTINUED | OUTPATIENT
Start: 2020-01-01 | End: 2020-01-01 | Stop reason: HOSPADM

## 2020-01-01 RX ORDER — DEXAMETHASONE 2 MG/1
2 TABLET ORAL EVERY 8 HOURS SCHEDULED
Status: DISCONTINUED | OUTPATIENT
Start: 2020-01-01 | End: 2020-01-01 | Stop reason: HOSPADM

## 2020-01-01 RX ORDER — FLUMAZENIL 0.1 MG/ML
INJECTION, SOLUTION INTRAVENOUS
Status: DISCONTINUED
Start: 2020-01-01 | End: 2020-01-01 | Stop reason: HOSPADM

## 2020-01-01 RX ORDER — VENLAFAXINE HYDROCHLORIDE 37.5 MG/1
37.5 CAPSULE, EXTENDED RELEASE ORAL DAILY
COMMUNITY

## 2020-01-01 RX ORDER — BUPIVACAINE HYDROCHLORIDE AND EPINEPHRINE 5; 5 MG/ML; UG/ML
INJECTION, SOLUTION PERINEURAL PRN
Status: DISCONTINUED | OUTPATIENT
Start: 2020-01-01 | End: 2020-01-01 | Stop reason: HOSPADM

## 2020-01-01 RX ORDER — DIPHENHYDRAMINE HYDROCHLORIDE 50 MG/ML
INJECTION INTRAMUSCULAR; INTRAVENOUS PRN
Status: DISCONTINUED | OUTPATIENT
Start: 2020-01-01 | End: 2020-01-01

## 2020-01-01 RX ORDER — ONDANSETRON 2 MG/ML
4 INJECTION INTRAMUSCULAR; INTRAVENOUS EVERY 30 MIN PRN
Status: DISCONTINUED | OUTPATIENT
Start: 2020-01-01 | End: 2020-01-01 | Stop reason: HOSPADM

## 2020-01-01 RX ORDER — NALOXONE HYDROCHLORIDE 0.4 MG/ML
0.4 INJECTION, SOLUTION INTRAMUSCULAR; INTRAVENOUS; SUBCUTANEOUS
Status: DISCONTINUED | OUTPATIENT
Start: 2020-01-01 | End: 2020-01-01 | Stop reason: HOSPADM

## 2020-01-01 RX ORDER — GADOBUTROL 604.72 MG/ML
0.1 INJECTION INTRAVENOUS ONCE
Status: COMPLETED | OUTPATIENT
Start: 2020-01-01 | End: 2020-01-01

## 2020-01-01 RX ORDER — HYDRALAZINE HYDROCHLORIDE 20 MG/ML
10-20 INJECTION INTRAMUSCULAR; INTRAVENOUS EVERY 30 MIN PRN
Status: DISCONTINUED | OUTPATIENT
Start: 2020-01-01 | End: 2020-01-01

## 2020-01-01 RX ORDER — DIPHENHYDRAMINE HCL 25 MG
25-50 CAPSULE ORAL EVERY 6 HOURS PRN
Status: DISCONTINUED | OUTPATIENT
Start: 2020-01-01 | End: 2020-01-01 | Stop reason: HOSPADM

## 2020-01-01 RX ORDER — LABETALOL HYDROCHLORIDE 5 MG/ML
10-40 INJECTION, SOLUTION INTRAVENOUS EVERY 10 MIN PRN
Status: DISCONTINUED | OUTPATIENT
Start: 2020-01-01 | End: 2020-01-01 | Stop reason: HOSPADM

## 2020-01-01 RX ORDER — HYDROMORPHONE HYDROCHLORIDE 1 MG/ML
.3-.5 INJECTION, SOLUTION INTRAMUSCULAR; INTRAVENOUS; SUBCUTANEOUS EVERY 10 MIN PRN
Status: DISCONTINUED | OUTPATIENT
Start: 2020-01-01 | End: 2020-01-01 | Stop reason: HOSPADM

## 2020-01-01 RX ORDER — BACLOFEN 10 MG/1
10 TABLET ORAL 2 TIMES DAILY
COMMUNITY

## 2020-01-01 RX ORDER — POLYETHYLENE GLYCOL 3350 17 G/17G
17 POWDER, FOR SOLUTION ORAL DAILY
Qty: 510 G | Refills: 0 | Status: SHIPPED | OUTPATIENT
Start: 2020-01-01 | End: 2020-01-01

## 2020-01-01 RX ORDER — METHOCARBAMOL 500 MG/1
500 TABLET, FILM COATED ORAL 4 TIMES DAILY PRN
Qty: 28 TABLET | Refills: 0 | Status: SHIPPED | OUTPATIENT
Start: 2020-01-01 | End: 2020-01-01

## 2020-01-01 RX ORDER — DEXAMETHASONE 1 MG
1 TABLET ORAL EVERY 12 HOURS SCHEDULED
Status: DISCONTINUED | OUTPATIENT
Start: 2020-01-01 | End: 2020-01-01 | Stop reason: HOSPADM

## 2020-01-01 RX ORDER — PHENYLEPHRINE HCL IN 0.9% NACL 50MG/250ML
0.5-6 PLASTIC BAG, INJECTION (ML) INTRAVENOUS CONTINUOUS
Status: DISCONTINUED | OUTPATIENT
Start: 2020-01-01 | End: 2020-01-01

## 2020-01-01 RX ORDER — LABETALOL HYDROCHLORIDE 5 MG/ML
10 INJECTION, SOLUTION INTRAVENOUS
Status: DISCONTINUED | OUTPATIENT
Start: 2020-01-01 | End: 2020-01-01 | Stop reason: HOSPADM

## 2020-01-01 RX ORDER — AMOXICILLIN 250 MG
1 CAPSULE ORAL 2 TIMES DAILY
Status: DISCONTINUED | OUTPATIENT
Start: 2020-01-01 | End: 2020-01-01 | Stop reason: HOSPADM

## 2020-01-01 RX ORDER — PROPOFOL 10 MG/ML
INJECTION, EMULSION INTRAVENOUS PRN
Status: DISCONTINUED | OUTPATIENT
Start: 2020-01-01 | End: 2020-01-01

## 2020-01-01 RX ORDER — VENLAFAXINE HYDROCHLORIDE 75 MG/1
75 CAPSULE, EXTENDED RELEASE ORAL EVERY MORNING
Status: DISCONTINUED | OUTPATIENT
Start: 2020-01-01 | End: 2020-01-01 | Stop reason: HOSPADM

## 2020-01-01 RX ORDER — ALBUTEROL SULFATE 90 UG/1
2 AEROSOL, METERED RESPIRATORY (INHALATION) 2 TIMES DAILY
Status: DISCONTINUED | OUTPATIENT
Start: 2020-01-01 | End: 2020-01-01 | Stop reason: HOSPADM

## 2020-01-01 RX ORDER — BACLOFEN 10 MG/1
10 TABLET ORAL 2 TIMES DAILY
Status: DISCONTINUED | OUTPATIENT
Start: 2020-01-01 | End: 2020-01-01 | Stop reason: HOSPADM

## 2020-01-01 RX ORDER — GABAPENTIN 300 MG/1
300 CAPSULE ORAL AT BEDTIME
Status: DISCONTINUED | OUTPATIENT
Start: 2020-01-01 | End: 2020-01-01 | Stop reason: HOSPADM

## 2020-01-01 RX ORDER — NITROFURANTOIN 25; 75 MG/1; MG/1
100 CAPSULE ORAL EVERY 12 HOURS
Qty: 8 CAPSULE | Refills: 0 | Status: SHIPPED | OUTPATIENT
Start: 2020-01-01 | End: 2020-01-01

## 2020-01-01 RX ORDER — DEXAMETHASONE SODIUM PHOSPHATE 4 MG/ML
INJECTION, SOLUTION INTRA-ARTICULAR; INTRALESIONAL; INTRAMUSCULAR; INTRAVENOUS; SOFT TISSUE PRN
Status: DISCONTINUED | OUTPATIENT
Start: 2020-01-01 | End: 2020-01-01

## 2020-01-01 RX ORDER — PROCHLORPERAZINE 25 MG
25 SUPPOSITORY, RECTAL RECTAL EVERY 12 HOURS PRN
Status: DISCONTINUED | OUTPATIENT
Start: 2020-01-01 | End: 2020-01-01 | Stop reason: HOSPADM

## 2020-01-01 RX ORDER — POLYETHYLENE GLYCOL 3350 17 G/17G
17 POWDER, FOR SOLUTION ORAL 2 TIMES DAILY
Status: DISCONTINUED | OUTPATIENT
Start: 2020-01-01 | End: 2020-01-01 | Stop reason: HOSPADM

## 2020-01-01 RX ORDER — METHOCARBAMOL 500 MG/1
500 TABLET, FILM COATED ORAL 4 TIMES DAILY PRN
Status: DISCONTINUED | OUTPATIENT
Start: 2020-01-01 | End: 2020-01-01 | Stop reason: HOSPADM

## 2020-01-01 RX ORDER — DEXAMETHASONE 4 MG/1
4 TABLET ORAL EVERY 6 HOURS SCHEDULED
Status: COMPLETED | OUTPATIENT
Start: 2020-01-01 | End: 2020-01-01

## 2020-01-01 RX ORDER — GABAPENTIN 100 MG/1
100 CAPSULE ORAL 2 TIMES DAILY
Qty: 56 CAPSULE | Refills: 0 | Status: SHIPPED | OUTPATIENT
Start: 2020-01-01 | End: 2021-01-01

## 2020-01-01 RX ORDER — CLINDAMYCIN PHOSPHATE 900 MG/50ML
900 INJECTION, SOLUTION INTRAVENOUS
Status: COMPLETED | OUTPATIENT
Start: 2020-01-01 | End: 2020-01-01

## 2020-01-01 RX ORDER — PROCHLORPERAZINE MALEATE 10 MG
10 TABLET ORAL EVERY 6 HOURS PRN
Status: DISCONTINUED | OUTPATIENT
Start: 2020-01-01 | End: 2020-01-01 | Stop reason: HOSPADM

## 2020-01-01 RX ORDER — NALOXONE HYDROCHLORIDE 0.4 MG/ML
0.2 INJECTION, SOLUTION INTRAMUSCULAR; INTRAVENOUS; SUBCUTANEOUS
Status: DISCONTINUED | OUTPATIENT
Start: 2020-01-01 | End: 2020-01-01 | Stop reason: HOSPADM

## 2020-01-01 RX ORDER — ONDANSETRON 4 MG/1
4-8 TABLET, ORALLY DISINTEGRATING ORAL EVERY 6 HOURS PRN
Status: DISCONTINUED | OUTPATIENT
Start: 2020-01-01 | End: 2020-01-01

## 2020-01-01 RX ORDER — SODIUM CHLORIDE 9 MG/ML
INJECTION, SOLUTION INTRAVENOUS CONTINUOUS
Status: ACTIVE | OUTPATIENT
Start: 2020-01-01 | End: 2020-01-01

## 2020-01-01 RX ORDER — HYDROMORPHONE HYDROCHLORIDE 1 MG/ML
.3-.5 INJECTION, SOLUTION INTRAMUSCULAR; INTRAVENOUS; SUBCUTANEOUS
Status: DISCONTINUED | OUTPATIENT
Start: 2020-01-01 | End: 2020-01-01 | Stop reason: HOSPADM

## 2020-01-01 RX ORDER — SENNA AND DOCUSATE SODIUM 50; 8.6 MG/1; MG/1
1 TABLET, FILM COATED ORAL AT BEDTIME
Qty: 14 TABLET | Refills: 0 | Status: SHIPPED | OUTPATIENT
Start: 2020-01-01 | End: 2020-01-01

## 2020-01-01 RX ORDER — OXYCODONE HYDROCHLORIDE 5 MG/1
5-10 TABLET ORAL
Status: DISCONTINUED | OUTPATIENT
Start: 2020-01-01 | End: 2020-01-01 | Stop reason: HOSPADM

## 2020-01-01 RX ORDER — SODIUM CHLORIDE, SODIUM LACTATE, POTASSIUM CHLORIDE, CALCIUM CHLORIDE 600; 310; 30; 20 MG/100ML; MG/100ML; MG/100ML; MG/100ML
INJECTION, SOLUTION INTRAVENOUS CONTINUOUS
Status: DISCONTINUED | OUTPATIENT
Start: 2020-01-01 | End: 2020-01-01 | Stop reason: HOSPADM

## 2020-01-01 RX ORDER — VENLAFAXINE HYDROCHLORIDE 37.5 MG/1
37.5 CAPSULE, EXTENDED RELEASE ORAL EVERY MORNING
Status: DISCONTINUED | OUTPATIENT
Start: 2020-01-01 | End: 2020-01-01 | Stop reason: HOSPADM

## 2020-01-01 RX ORDER — CLINDAMYCIN PHOSPHATE 900 MG/50ML
900 INJECTION, SOLUTION INTRAVENOUS SEE ADMIN INSTRUCTIONS
Status: DISCONTINUED | OUTPATIENT
Start: 2020-01-01 | End: 2020-01-01 | Stop reason: HOSPADM

## 2020-01-01 RX ORDER — GABAPENTIN 300 MG/1
300 CAPSULE ORAL AT BEDTIME
Qty: 28 CAPSULE | Refills: 0 | Status: SHIPPED | OUTPATIENT
Start: 2020-01-01 | End: 2021-01-01

## 2020-01-01 RX ORDER — DEXMEDETOMIDINE HYDROCHLORIDE 4 UG/ML
0.2-0.7 INJECTION, SOLUTION INTRAVENOUS CONTINUOUS
Status: DISCONTINUED | OUTPATIENT
Start: 2020-01-01 | End: 2020-01-01

## 2020-01-01 RX ORDER — LIDOCAINE HYDROCHLORIDE 20 MG/ML
INJECTION, SOLUTION INFILTRATION; PERINEURAL PRN
Status: DISCONTINUED | OUTPATIENT
Start: 2020-01-01 | End: 2020-01-01

## 2020-01-01 RX ORDER — FENTANYL CITRATE 50 UG/ML
25-50 INJECTION, SOLUTION INTRAMUSCULAR; INTRAVENOUS EVERY 5 MIN PRN
Status: DISCONTINUED | OUTPATIENT
Start: 2020-01-01 | End: 2020-01-01 | Stop reason: HOSPADM

## 2020-01-01 RX ORDER — SODIUM CHLORIDE, SODIUM LACTATE, POTASSIUM CHLORIDE, CALCIUM CHLORIDE 600; 310; 30; 20 MG/100ML; MG/100ML; MG/100ML; MG/100ML
INJECTION, SOLUTION INTRAVENOUS CONTINUOUS PRN
Status: DISCONTINUED | OUTPATIENT
Start: 2020-01-01 | End: 2020-01-01

## 2020-01-01 RX ORDER — ONDANSETRON 4 MG/1
4 TABLET, ORALLY DISINTEGRATING ORAL EVERY 6 HOURS PRN
Status: DISCONTINUED | OUTPATIENT
Start: 2020-01-01 | End: 2020-01-01 | Stop reason: HOSPADM

## 2020-01-01 RX ORDER — NITROFURANTOIN 25; 75 MG/1; MG/1
100 CAPSULE ORAL EVERY 12 HOURS SCHEDULED
Status: DISCONTINUED | OUTPATIENT
Start: 2020-01-01 | End: 2020-01-01 | Stop reason: HOSPADM

## 2020-01-01 RX ORDER — IPRATROPIUM BROMIDE AND ALBUTEROL SULFATE 2.5; .5 MG/3ML; MG/3ML
3 SOLUTION RESPIRATORY (INHALATION) ONCE
Status: DISCONTINUED | OUTPATIENT
Start: 2020-01-01 | End: 2020-01-01

## 2020-01-01 RX ORDER — DEXAMETHASONE 2 MG/1
TABLET ORAL
Qty: 64 TABLET | Refills: 0 | Status: SHIPPED | OUTPATIENT
Start: 2020-01-01 | End: 2020-01-01

## 2020-01-01 RX ADMIN — ALBUTEROL SULFATE 2 PUFF: 90 AEROSOL, METERED RESPIRATORY (INHALATION) at 20:45

## 2020-01-01 RX ADMIN — REMIFENTANIL HYDROCHLORIDE 0.05 MCG/KG/MIN: 1 INJECTION, POWDER, LYOPHILIZED, FOR SOLUTION INTRAVENOUS at 22:23

## 2020-01-01 RX ADMIN — BACLOFEN 10 MG: 10 TABLET ORAL at 08:23

## 2020-01-01 RX ADMIN — GABAPENTIN 300 MG: 300 CAPSULE ORAL at 22:16

## 2020-01-01 RX ADMIN — ACETAMINOPHEN 975 MG: 325 TABLET, FILM COATED ORAL at 04:05

## 2020-01-01 RX ADMIN — DEXAMETHASONE 3 MG: 2 TABLET ORAL at 22:16

## 2020-01-01 RX ADMIN — HYDROMORPHONE HYDROCHLORIDE 0.5 MG: 1 INJECTION, SOLUTION INTRAMUSCULAR; INTRAVENOUS; SUBCUTANEOUS at 15:12

## 2020-01-01 RX ADMIN — DEXAMETHASONE 4 MG: 4 TABLET ORAL at 04:28

## 2020-01-01 RX ADMIN — DIPHENHYDRAMINE HYDROCHLORIDE 25 MG: 50 INJECTION, SOLUTION INTRAMUSCULAR; INTRAVENOUS at 15:35

## 2020-01-01 RX ADMIN — HYDROMORPHONE HYDROCHLORIDE 0.3 MG: 1 INJECTION, SOLUTION INTRAMUSCULAR; INTRAVENOUS; SUBCUTANEOUS at 00:38

## 2020-01-01 RX ADMIN — OMEPRAZOLE 20 MG: 20 CAPSULE, DELAYED RELEASE ORAL at 09:08

## 2020-01-01 RX ADMIN — LORAZEPAM 1 MG: 2 INJECTION INTRAMUSCULAR; INTRAVENOUS at 12:54

## 2020-01-01 RX ADMIN — VENLAFAXINE HYDROCHLORIDE 75 MG: 75 CAPSULE, EXTENDED RELEASE ORAL at 08:22

## 2020-01-01 RX ADMIN — MIDAZOLAM 2 MG: 1 INJECTION INTRAMUSCULAR; INTRAVENOUS at 14:54

## 2020-01-01 RX ADMIN — Medication 0.2 MCG/KG/MIN: at 17:53

## 2020-01-01 RX ADMIN — BENZOCAINE AND MENTHOL 1 LOZENGE: 15; 3.6 LOZENGE ORAL at 01:45

## 2020-01-01 RX ADMIN — DIPHENHYDRAMINE HYDROCHLORIDE 50 MG: 25 CAPSULE ORAL at 08:36

## 2020-01-01 RX ADMIN — PHENYLEPHRINE HYDROCHLORIDE 100 MCG: 10 INJECTION INTRAVENOUS at 16:57

## 2020-01-01 RX ADMIN — OXYCODONE HYDROCHLORIDE 5 MG: 5 TABLET ORAL at 00:42

## 2020-01-01 RX ADMIN — POTASSIUM & SODIUM PHOSPHATES POWDER PACK 280-160-250 MG 2 PACKET: 280-160-250 PACK at 08:27

## 2020-01-01 RX ADMIN — SODIUM CHLORIDE, POTASSIUM CHLORIDE, SODIUM LACTATE AND CALCIUM CHLORIDE: 600; 310; 30; 20 INJECTION, SOLUTION INTRAVENOUS at 18:00

## 2020-01-01 RX ADMIN — OMEPRAZOLE 20 MG: 20 CAPSULE, DELAYED RELEASE ORAL at 16:00

## 2020-01-01 RX ADMIN — DEXAMETHASONE 4 MG: 4 TABLET ORAL at 22:02

## 2020-01-01 RX ADMIN — DIPHENHYDRAMINE HYDROCHLORIDE 25 MG: 25 CAPSULE ORAL at 20:43

## 2020-01-01 RX ADMIN — VENLAFAXINE HYDROCHLORIDE 37.5 MG: 37.5 CAPSULE, EXTENDED RELEASE ORAL at 08:37

## 2020-01-01 RX ADMIN — DEXAMETHASONE 4 MG: 4 TABLET ORAL at 22:18

## 2020-01-01 RX ADMIN — DOCUSATE SODIUM AND SENNOSIDES 2 TABLET: 8.6; 5 TABLET ORAL at 20:35

## 2020-01-01 RX ADMIN — DIPHENHYDRAMINE HYDROCHLORIDE 50 MG: 25 CAPSULE ORAL at 08:04

## 2020-01-01 RX ADMIN — PROCHLORPERAZINE EDISYLATE 10 MG: 5 INJECTION INTRAMUSCULAR; INTRAVENOUS at 02:05

## 2020-01-01 RX ADMIN — GADOBUTROL 10 ML: 604.72 INJECTION INTRAVENOUS at 13:51

## 2020-01-01 RX ADMIN — ONDANSETRON 4 MG: 2 INJECTION INTRAMUSCULAR; INTRAVENOUS at 16:00

## 2020-01-01 RX ADMIN — DOCUSATE SODIUM AND SENNOSIDES 2 TABLET: 8.6; 5 TABLET ORAL at 08:23

## 2020-01-01 RX ADMIN — ACETAMINOPHEN 975 MG: 325 TABLET, FILM COATED ORAL at 01:45

## 2020-01-01 RX ADMIN — FENTANYL CITRATE 100 MCG: 50 INJECTION, SOLUTION INTRAMUSCULAR; INTRAVENOUS at 15:20

## 2020-01-01 RX ADMIN — DEXAMETHASONE 4 MG: 4 TABLET ORAL at 09:40

## 2020-01-01 RX ADMIN — ACETAMINOPHEN 975 MG: 325 TABLET, FILM COATED ORAL at 10:35

## 2020-01-01 RX ADMIN — POTASSIUM & SODIUM PHOSPHATES POWDER PACK 280-160-250 MG 2 PACKET: 280-160-250 PACK at 20:36

## 2020-01-01 RX ADMIN — ACETAMINOPHEN 650 MG: 325 TABLET, FILM COATED ORAL at 02:01

## 2020-01-01 RX ADMIN — BACLOFEN 10 MG: 10 TABLET ORAL at 08:37

## 2020-01-01 RX ADMIN — REMIFENTANIL HYDROCHLORIDE 0.1 MCG/KG/MIN: 1 INJECTION, POWDER, LYOPHILIZED, FOR SOLUTION INTRAVENOUS at 16:52

## 2020-01-01 RX ADMIN — BACLOFEN 10 MG: 10 TABLET ORAL at 20:45

## 2020-01-01 RX ADMIN — SODIUM CHLORIDE: 9 INJECTION, SOLUTION INTRAVENOUS at 00:39

## 2020-01-01 RX ADMIN — ONDANSETRON 4 MG: 4 TABLET, ORALLY DISINTEGRATING ORAL at 00:42

## 2020-01-01 RX ADMIN — GADOBUTROL 10 ML: 604.72 INJECTION INTRAVENOUS at 11:30

## 2020-01-01 RX ADMIN — SUGAMMADEX 200 MG: 100 INJECTION, SOLUTION INTRAVENOUS at 23:24

## 2020-01-01 RX ADMIN — VENLAFAXINE HYDROCHLORIDE 75 MG: 75 CAPSULE, EXTENDED RELEASE ORAL at 08:04

## 2020-01-01 RX ADMIN — ONDANSETRON 4 MG: 4 TABLET, ORALLY DISINTEGRATING ORAL at 22:38

## 2020-01-01 RX ADMIN — NITROFURANTOIN (MONOHYDRATE/MACROCRYSTALS) 100 MG: 75; 25 CAPSULE ORAL at 22:15

## 2020-01-01 RX ADMIN — DIPHENHYDRAMINE HYDROCHLORIDE 50 MG: 25 CAPSULE ORAL at 20:52

## 2020-01-01 RX ADMIN — GABAPENTIN 300 MG: 300 CAPSULE ORAL at 22:18

## 2020-01-01 RX ADMIN — CLINDAMYCIN IN 5 PERCENT DEXTROSE 900 MG: 18 INJECTION, SOLUTION INTRAVENOUS at 17:05

## 2020-01-01 RX ADMIN — DOCUSATE SODIUM AND SENNOSIDES 2 TABLET: 8.6; 5 TABLET ORAL at 09:07

## 2020-01-01 RX ADMIN — SODIUM CHLORIDE, POTASSIUM CHLORIDE, SODIUM LACTATE AND CALCIUM CHLORIDE: 600; 310; 30; 20 INJECTION, SOLUTION INTRAVENOUS at 14:54

## 2020-01-01 RX ADMIN — MAGNESIUM SULFATE IN WATER 2 G: 40 INJECTION, SOLUTION INTRAVENOUS at 07:08

## 2020-01-01 RX ADMIN — OXYCODONE HYDROCHLORIDE 10 MG: 5 TABLET ORAL at 18:26

## 2020-01-01 RX ADMIN — OXYCODONE HYDROCHLORIDE 5 MG: 5 TABLET ORAL at 08:36

## 2020-01-01 RX ADMIN — BACLOFEN 10 MG: 10 TABLET ORAL at 09:08

## 2020-01-01 RX ADMIN — ONDANSETRON 4 MG: 2 INJECTION INTRAMUSCULAR; INTRAVENOUS at 00:11

## 2020-01-01 RX ADMIN — GABAPENTIN 300 MG: 300 CAPSULE ORAL at 03:22

## 2020-01-01 RX ADMIN — BACLOFEN 10 MG: 10 TABLET ORAL at 20:35

## 2020-01-01 RX ADMIN — POTASSIUM & SODIUM PHOSPHATES POWDER PACK 280-160-250 MG 2 PACKET: 280-160-250 PACK at 15:33

## 2020-01-01 RX ADMIN — DEXAMETHASONE 4 MG: 4 TABLET ORAL at 04:04

## 2020-01-01 RX ADMIN — HYDROMORPHONE HYDROCHLORIDE 0.5 MG: 1 INJECTION, SOLUTION INTRAMUSCULAR; INTRAVENOUS; SUBCUTANEOUS at 09:39

## 2020-01-01 RX ADMIN — DEXMEDETOMIDINE 0.2 MCG/KG/HR: 100 INJECTION, SOLUTION, CONCENTRATE INTRAVENOUS at 16:15

## 2020-01-01 RX ADMIN — NITROFURANTOIN (MONOHYDRATE/MACROCRYSTALS) 100 MG: 75; 25 CAPSULE ORAL at 09:06

## 2020-01-01 RX ADMIN — OXYCODONE HYDROCHLORIDE 5 MG: 5 TABLET ORAL at 15:36

## 2020-01-01 RX ADMIN — DIPHENHYDRAMINE HYDROCHLORIDE 50 MG: 25 CAPSULE ORAL at 14:26

## 2020-01-01 RX ADMIN — GABAPENTIN 100 MG: 100 CAPSULE ORAL at 09:09

## 2020-01-01 RX ADMIN — FENTANYL CITRATE 50 MCG: 50 INJECTION, SOLUTION INTRAMUSCULAR; INTRAVENOUS at 00:49

## 2020-01-01 RX ADMIN — SODIUM CHLORIDE: 9 INJECTION, SOLUTION INTRAVENOUS at 03:19

## 2020-01-01 RX ADMIN — HYDROMORPHONE HYDROCHLORIDE 0.5 MG: 1 INJECTION, SOLUTION INTRAMUSCULAR; INTRAVENOUS; SUBCUTANEOUS at 22:03

## 2020-01-01 RX ADMIN — DEXAMETHASONE 3 MG: 2 TABLET ORAL at 05:01

## 2020-01-01 RX ADMIN — BACLOFEN 10 MG: 10 TABLET ORAL at 08:40

## 2020-01-01 RX ADMIN — ROCURONIUM BROMIDE 100 MG: 10 INJECTION INTRAVENOUS at 15:20

## 2020-01-01 RX ADMIN — POLYETHYLENE GLYCOL 3350 17 G: 17 POWDER, FOR SOLUTION ORAL at 08:37

## 2020-01-01 RX ADMIN — LIDOCAINE HYDROCHLORIDE 100 MG: 20 INJECTION, SOLUTION INFILTRATION; PERINEURAL at 15:20

## 2020-01-01 RX ADMIN — HYDROMORPHONE HYDROCHLORIDE 0.4 MG: 1 INJECTION, SOLUTION INTRAMUSCULAR; INTRAVENOUS; SUBCUTANEOUS at 01:02

## 2020-01-01 RX ADMIN — DIPHENHYDRAMINE HYDROCHLORIDE 50 MG: 25 CAPSULE ORAL at 17:37

## 2020-01-01 RX ADMIN — GABAPENTIN 100 MG: 100 CAPSULE ORAL at 08:36

## 2020-01-01 RX ADMIN — ONDANSETRON 4 MG: 4 TABLET, ORALLY DISINTEGRATING ORAL at 02:01

## 2020-01-01 RX ADMIN — ACETAMINOPHEN 650 MG: 325 TABLET, FILM COATED ORAL at 09:29

## 2020-01-01 RX ADMIN — DIPHENHYDRAMINE HYDROCHLORIDE 25 MG: 25 CAPSULE ORAL at 22:18

## 2020-01-01 RX ADMIN — ONDANSETRON 4 MG: 2 INJECTION INTRAMUSCULAR; INTRAVENOUS at 23:04

## 2020-01-01 RX ADMIN — HYDROMORPHONE HYDROCHLORIDE 0.3 MG: 1 INJECTION, SOLUTION INTRAMUSCULAR; INTRAVENOUS; SUBCUTANEOUS at 00:15

## 2020-01-01 RX ADMIN — DIPHENHYDRAMINE HYDROCHLORIDE 50 MG: 25 CAPSULE ORAL at 23:15

## 2020-01-01 RX ADMIN — PHENYLEPHRINE HYDROCHLORIDE 100 MCG: 10 INJECTION INTRAVENOUS at 17:15

## 2020-01-01 RX ADMIN — ACETAMINOPHEN 975 MG: 325 TABLET, FILM COATED ORAL at 09:40

## 2020-01-01 RX ADMIN — DOCUSATE SODIUM AND SENNOSIDES 1 TABLET: 8.6; 5 TABLET ORAL at 08:36

## 2020-01-01 RX ADMIN — ACETAMINOPHEN 975 MG: 325 TABLET, FILM COATED ORAL at 18:52

## 2020-01-01 RX ADMIN — OMEPRAZOLE 20 MG: 20 CAPSULE, DELAYED RELEASE ORAL at 08:35

## 2020-01-01 RX ADMIN — FENTANYL CITRATE 100 MCG: 50 INJECTION, SOLUTION INTRAMUSCULAR; INTRAVENOUS at 16:05

## 2020-01-01 RX ADMIN — OXYCODONE HYDROCHLORIDE 5 MG: 5 TABLET ORAL at 20:52

## 2020-01-01 RX ADMIN — ALBUTEROL SULFATE 2 PUFF: 90 AEROSOL, METERED RESPIRATORY (INHALATION) at 08:35

## 2020-01-01 RX ADMIN — DEXAMETHASONE SODIUM PHOSPHATE 8 MG: 4 INJECTION, SOLUTION INTRA-ARTICULAR; INTRALESIONAL; INTRAMUSCULAR; INTRAVENOUS; SOFT TISSUE at 15:20

## 2020-01-01 RX ADMIN — POLYETHYLENE GLYCOL 3350 17 G: 17 POWDER, FOR SOLUTION ORAL at 08:22

## 2020-01-01 RX ADMIN — DIPHENHYDRAMINE HYDROCHLORIDE 50 MG: 25 CAPSULE ORAL at 10:41

## 2020-01-01 RX ADMIN — ALBUTEROL SULFATE 2 PUFF: 90 AEROSOL, METERED RESPIRATORY (INHALATION) at 08:37

## 2020-01-01 RX ADMIN — GABAPENTIN 300 MG: 300 CAPSULE ORAL at 22:01

## 2020-01-01 RX ADMIN — LORAZEPAM 1 MG: 2 INJECTION INTRAMUSCULAR; INTRAVENOUS at 11:11

## 2020-01-01 RX ADMIN — DIPHENHYDRAMINE HYDROCHLORIDE 50 MG: 25 CAPSULE ORAL at 09:14

## 2020-01-01 RX ADMIN — HYDROMORPHONE HYDROCHLORIDE 0.5 MG: 1 INJECTION, SOLUTION INTRAMUSCULAR; INTRAVENOUS; SUBCUTANEOUS at 19:59

## 2020-01-01 RX ADMIN — DOCUSATE SODIUM AND SENNOSIDES 1 TABLET: 8.6; 5 TABLET ORAL at 20:02

## 2020-01-01 RX ADMIN — ALBUTEROL SULFATE 2 PUFF: 90 AEROSOL, METERED RESPIRATORY (INHALATION) at 09:08

## 2020-01-01 RX ADMIN — METHOCARBAMOL 500 MG: 500 TABLET, FILM COATED ORAL at 08:04

## 2020-01-01 RX ADMIN — DIPHENHYDRAMINE HYDROCHLORIDE 50 MG: 25 CAPSULE ORAL at 02:01

## 2020-01-01 RX ADMIN — OXYCODONE HYDROCHLORIDE 5 MG: 5 TABLET ORAL at 09:06

## 2020-01-01 RX ADMIN — ALBUTEROL SULFATE 2 PUFF: 90 AEROSOL, METERED RESPIRATORY (INHALATION) at 08:04

## 2020-01-01 RX ADMIN — ROCURONIUM BROMIDE 50 MG: 10 INJECTION INTRAVENOUS at 16:45

## 2020-01-01 RX ADMIN — DEXAMETHASONE 3 MG: 2 TABLET ORAL at 09:05

## 2020-01-01 RX ADMIN — ALBUTEROL SULFATE 2 PUFF: 90 AEROSOL, METERED RESPIRATORY (INHALATION) at 20:02

## 2020-01-01 RX ADMIN — ACETAMINOPHEN 975 MG: 325 TABLET, FILM COATED ORAL at 17:37

## 2020-01-01 RX ADMIN — ALBUTEROL SULFATE 2 PUFF: 90 AEROSOL, METERED RESPIRATORY (INHALATION) at 20:36

## 2020-01-01 RX ADMIN — ACETAMINOPHEN 975 MG: 325 TABLET, FILM COATED ORAL at 18:23

## 2020-01-01 RX ADMIN — DIPHENHYDRAMINE HYDROCHLORIDE 25 MG: 25 CAPSULE ORAL at 13:05

## 2020-01-01 RX ADMIN — PROPOFOL 100 MG: 10 INJECTION, EMULSION INTRAVENOUS at 15:20

## 2020-01-01 RX ADMIN — ALBUTEROL SULFATE 2 PUFF: 90 AEROSOL, METERED RESPIRATORY (INHALATION) at 08:34

## 2020-01-01 RX ADMIN — DEXAMETHASONE 4 MG: 4 TABLET ORAL at 16:00

## 2020-01-01 RX ADMIN — VENLAFAXINE HYDROCHLORIDE 37.5 MG: 37.5 CAPSULE, EXTENDED RELEASE ORAL at 09:08

## 2020-01-01 RX ADMIN — LORAZEPAM 2 MG: 2 INJECTION INTRAMUSCULAR; INTRAVENOUS at 13:05

## 2020-01-01 RX ADMIN — OMEPRAZOLE 20 MG: 20 CAPSULE, DELAYED RELEASE ORAL at 08:23

## 2020-01-01 RX ADMIN — BACLOFEN 10 MG: 10 TABLET ORAL at 20:02

## 2020-01-01 RX ADMIN — HYDROMORPHONE HYDROCHLORIDE 0.5 MG: 1 INJECTION, SOLUTION INTRAMUSCULAR; INTRAVENOUS; SUBCUTANEOUS at 21:35

## 2020-01-01 RX ADMIN — DEXAMETHASONE 4 MG: 4 TABLET ORAL at 10:35

## 2020-01-01 RX ADMIN — POLYETHYLENE GLYCOL 3350 17 G: 17 POWDER, FOR SOLUTION ORAL at 09:06

## 2020-01-01 RX ADMIN — OXYCODONE HYDROCHLORIDE 5 MG: 5 TABLET ORAL at 20:43

## 2020-01-01 RX ADMIN — DEXAMETHASONE 4 MG: 4 TABLET ORAL at 15:34

## 2020-01-01 RX ADMIN — PROCHLORPERAZINE EDISYLATE 5 MG: 5 INJECTION INTRAMUSCULAR; INTRAVENOUS at 01:30

## 2020-01-01 RX ADMIN — FENTANYL CITRATE 25 MCG: 50 INJECTION, SOLUTION INTRAMUSCULAR; INTRAVENOUS at 00:11

## 2020-01-01 ASSESSMENT — ACTIVITIES OF DAILY LIVING (ADL)
DIFFICULTY_COMMUNICATING: NO
ADLS_ACUITY_SCORE: 15
ADLS_ACUITY_SCORE: 11
ADLS_ACUITY_SCORE: 16
ADLS_ACUITY_SCORE: 15
ADLS_ACUITY_SCORE: 16
PATIENT_/_FAMILY_COMMUNICATION_STYLE: SPOKEN LANGUAGE (ENGLISH OR BILINGUAL)
ADLS_ACUITY_SCORE: 16
DIFFICULTY_EATING/SWALLOWING: NO
ADLS_ACUITY_SCORE: 13
ADLS_ACUITY_SCORE: 16
ADLS_ACUITY_SCORE: 15
WALKING_OR_CLIMBING_STAIRS_DIFFICULTY: NO
ADLS_ACUITY_SCORE: 15
ADLS_ACUITY_SCORE: 16
DRESSING/BATHING_DIFFICULTY: NO
ADLS_ACUITY_SCORE: 13
VISION_MANAGEMENT: GLASSES
ADLS_ACUITY_SCORE: 11
HEARING_DIFFICULTY_OR_DEAF: NO
CONCENTRATING,_REMEMBERING_OR_MAKING_DECISIONS_DIFFICULTY: NO
TOILETING_ISSUES: NO
WEAR_GLASSES_OR_BLIND: YES
ADLS_ACUITY_SCORE: 13
PREVIOUS_RESPONSIBILITIES: MEAL PREP;HOUSEKEEPING;LAUNDRY;SHOPPING;MEDICATION MANAGEMENT;FINANCES
ADLS_ACUITY_SCORE: 16
FALL_HISTORY_WITHIN_LAST_SIX_MONTHS: NO
ADLS_ACUITY_SCORE: 16
ADLS_ACUITY_SCORE: 16
ADLS_ACUITY_SCORE: 13
ADLS_ACUITY_SCORE: 13
ADLS_ACUITY_SCORE: 16
DOING_ERRANDS_INDEPENDENTLY_DIFFICULTY: NO

## 2020-01-01 ASSESSMENT — MIFFLIN-ST. JEOR
SCORE: 1795.28
SCORE: 1809.88

## 2020-01-01 ASSESSMENT — VISUAL ACUITY
OU: BLURRED VISION;OTHER (SEE COMMENT)
OU: BLURRED VISION;OTHER (SEE COMMENT)

## 2020-09-24 NOTE — PROGRESS NOTES
Action 9.24.20 DEJA   Action Taken Received imaging disk from [facility] with 9.17.20 MRI Brain study. Brought up to 4th floor to be uploaded in Pac[facility].

## 2020-09-24 NOTE — TELEPHONE ENCOUNTER
[facility] Call Center    Phone Message    May a detailed message be left on voicemail: yes     Reason for Call: Other: Patient's brother called and LVM on cancellation line. Stated he was returning a call from Dr. Lau. Needs to be called after 3PM today     Action Taken: Message routed to:  [facility] Neurosurgery    Travel Screening: Not Applicable

## 2020-10-02 NOTE — TELEPHONE ENCOUNTER
[facility] Call Center    Phone Message    May a detailed message be left on voicemail: yes     Reason for Call: Other: Glen calling to request a call back regarding scheduling his sister's surgery appointment and needing the notes from the conference call with him, Chari and Dr. Mcduffie sent to Dr. Mcnamara. He thinks the fax number is [phone], but not quite sure. Please call Glen back at your earliest convenience to discuss.     Action Taken: Message routed to:  [facility]    Travel Screening: Not Applicable

## 2020-10-02 NOTE — TELEPHONE ENCOUNTER
[facility] Call Center    Phone Message    May a detailed message be left on voicemail: yes     Reason for Call: Other: Ron calling asking for a call back from Srinath to get an appointment scheduled for pt. FYI pt spouse very upset and is asking for a call back as soon as possible. Per Efrain he has left multiple messages for srinath with no response.      Action Taken: Message routed to:  [facility]  neurosurg    Travel Screening: Not Applicable

## 2020-10-05 NOTE — TELEPHONE ENCOUNTER
I reached out to the patient's brother to explain that I am unable to schedule procedures past the end of the year.    The phone rang multiple times and then the call dropped. I will try to contact at a later time.    I let the patient's care team know that I will reach out to the patient when I am able to schedule.

## 2020-10-06 NOTE — TELEPHONE ENCOUNTER
I did receive a message from Glen today. No other messages have been received.   I did leave him a message to call me back.

## 2020-10-12 NOTE — TELEPHONE ENCOUNTER
I had a phone call discussion with Chari and also with her son.  We discussed the recent magnetic resonance imaging findings on the scan obtained in [place] with Dr. Michelle.  The small residual meningioma has increased significantly over the last year.  We discussed options which include further observation versus craniotomy and resection of tumor versus radiation.  The tumor is relatively close to the surface with an open corridor or making the risk of resection low.  She would like to move forward with resecting the lesion later this year and I think this is a very reasonable option.  We will make arrangements to bring her down for a clinic visit when she is able and we will try to do the surgery during the same visit.  We will obtain a new MRI scan at the same time to use for our image guidance.  We discussed risks of the procedure which include inability to completely excise the tumor, bleeding, infection, cerebrospinal fluid leak and possible worsening of vision.  She understands and wishes to proceed.

## 2020-12-16 PROBLEM — G93.89 BRAIN MASS: Status: ACTIVE | Noted: 2020-01-01

## 2020-12-16 NOTE — PHARMACY-ADMISSION MEDICATION HISTORY
Admission Medication History Completed by Pharmacy    See [facility] Admission Navigator for allergy information, preferred outpatient pharmacy, prior to admission medications and immunization status.     Medication History Sources:     Patient, Care Everywhere admission/clinical summary     Changes made to PTA medication list (reason):    Added:   o Omeprazole 20mg daily   o Baclofen 10mg BID  o Venlafaxine 37.5mg daily       Deleted:   o Linzess (dc'd per patient)  o Methocarbamol (baclofen to replace)      Changed:   o Gabapentin 300mg at bedtime --> Gabapentin 100mg in the morning, 100mg in the afternoon, and 300mg at bedtime  o Symbicort BID --> Symbicort 2 puffs BID  o Vitamin D 50,000 units twice weekly --> Vitamin D 50,000 units twice weekly on Monday and Friday     Additional Information:    Patient completed 5-day course of Macrobid 100mg BID at OSH    Patient takes 2 different strengths of venlafaxine to add up to total dose of 112.5mg daily     Patient was a good historian, able to list names and strengths of medications unprompted.     Prior to Admission medications    Medication Sig Last Dose Taking? Auth Provider   baclofen (LIORESAL) 10 MG tablet Take 10 mg by mouth 2 times daily  Yes Unknown, Entered By History   omeprazole (PRILOSEC) 20 MG DR capsule Take 20 mg by mouth daily  Yes Unknown, Entered By History   venlafaxine (EFFEXOR-XR) 37.5 MG 24 hr capsule Take 37.5 mg by mouth daily Take with 75mg ER capsule for total daily dose of 112.5mg  Yes Unknown, Entered By History   albuterol (PROAIR HFA/PROVENTIL HFA/VENTOLIN HFA) 108 (90 BASE) MCG/ACT Inhaler Inhale 2 puffs into the lungs 2 times daily    Reported, Patient   Budesonide-Formoterol Fumarate (SYMBICORT IN) Inhale 2 puffs into the lungs 2 times daily    Reported, Patient   Cholecalciferol (VITAMIN D PO) Take 50,000 Units by mouth twice a week On Monday and Friday   Reported, Patient   diphenhydrAMINE (BENADRYL) 25 MG capsule Take by mouth as  needed   Reported, Patient   gabapentin (NEURONTIN) 100 MG capsule Take 100mg in the morning, 100mg in the afternoon, and 300mg in the evening.   Reported, Patient   medroxyPROGESTERone (DEPO-PROVERA) 150 MG/ML injection Inject 150 mg into the muscle every 3 months   Reported, Patient   venlafaxine (EFFEXOR-XR) 75 MG 24 hr capsule Take 75 mg by mouth every morning Take with 37.5mg ER capsule for total daily dose of 112.5mg daily   Reported, Patient         Date completed: 12/16/20    Medication history completed by: Geoffrey Hopkins, PharmD

## 2020-12-16 NOTE — ANESTHESIA PROCEDURE NOTES
Airway   Date/Time: 12/16/2020 3:25 PM   Patient location during procedure: OR    Staff -   Performed By: CRNA    Consent for Airway   Urgency: elective    Indications and Patient Condition  Indications for airway management: chasity-procedural  Induction type:intravenousMask difficulty assessment: 1 - vent by mask    Final Airway Details  Final airway type: endotracheal airway  Successful airway:ETT - single  Endotracheal Airway Details   ETT size (mm): 7.5  Cuffed: yes  Successful intubation technique: video laryngoscopy  Grade View of Cords: 1  Adjucts: stylet  Measured from: gums/teeth  Secured with: commercial tube esquivel  Bite block used: Soft    Post intubation assessment   Placement verified by: capnometry   Number of attempts at approach: 1  Number of other approaches attempted: 0  Secured with:commercial tube esquivel  Ease of procedure: easy  Dentition: Intact

## 2020-12-16 NOTE — H&P
[facility]       NEUROSURGERY H&P NOTE    HPI: Chari Coates is a 45 year old female with a past medical history of ALL s/p chemo and radiation when she was a child.  She subsequently developed multiple meningiomas s/p resection with Dr. Lau and 2017.  Starting on Thursday, she noted blurry vision and so she talk to her doctor who recommended she present to the emergency department.  She went to the emergency department in [place] where they obtained imaging which showed interval increase in her known meningioma.  Her visual symptoms have since resolved, but she was transferred to [facility] for resection of the growing meningioma.  She currently feels at her baseline.      PAST MEDICAL HISTORY:   Past Medical History:   Diagnosis Date     ALL (acute lymphocytic leukemia) (H)      Cerebral meningioma (H)      Chronic constipation      Depression      Fibromyalgia      Gastroesophageal reflux disease      IBS (irritable bowel syndrome)      Osteoporosis      Rheumatoid arthritis (H)      Uncomplicated asthma        PAST SURGICAL HISTORY:   Past Surgical History:   Procedure Laterality Date     ARTHROSCOPY KNEE Right      CARPAL TUNNEL RELEASE RT/LT Left     left thumb release     EXCISE GANGLION HAND Right      LAPAROSCOPIC NISSEN FUNDOPLICATION       OPTICAL TRACKING SYSTEM CRANIOTOMY, EXCISE TUMOR, COMBINED Right 3/30/2017    Procedure: COMBINED OPTICAL TRACKING SYSTEM CRANIOTOMY, EXCISE TUMOR;  Surgeon: Ángel Lau MD;  Location: [facility] OR     TOE SURGERY Left      TONSILLECTOMY         FAMILY HISTORY: No family history on file.    SOCIAL HISTORY:   Social History     Tobacco Use     Smoking status: Never Smoker   Substance Use Topics     Alcohol use: No       MEDICATIONS:  Medications Prior to Admission   Medication Sig Dispense Refill Last Dose     albuterol (PROAIR HFA/PROVENTIL HFA/VENTOLIN HFA) 108 (90 BASE) MCG/ACT Inhaler Inhale 2 puffs into the  "lungs 2 times daily         Budesonide-Formoterol Fumarate (SYMBICORT IN) Inhale into the lungs 2 times daily         Cholecalciferol (VITAMIN D PO) Take 50,000 Units by mouth twice a week        diphenhydrAMINE (BENADRYL) 25 MG capsule Take by mouth as needed        GABAPENTIN PO Take 300 mg by mouth At Bedtime        Linaclotide (LINZESS PO) Take 145 mcg by mouth every morning (before breakfast)        medroxyPROGESTERone (DEPO-PROVERA) 150 MG/ML injection Inject 150 mg into the muscle every 3 months        methocarbamol (ROBAXIN) 500 MG tablet Take by mouth 4 times daily as needed for muscle spasms        venlafaxine (EFFEXOR-XR) 75 MG 24 hr capsule Take 75 mg by mouth every morning           Allergies:  Allergies   Allergen Reactions     Aspirin Other (See Comments)     D/t ulcer     Augmentin      Azithromycin      Bactrim [Sulfamethoxazole W/Trimethoprim]      Cefdinir      Cortisone      Erythromycin      Flonase [Fluticasone]      Iodine      IV     Lactose Nausea     Methylprednisolone      Naltrexone-Bupropion Hcl Er Nausea     No Clinical Screening - See Comments Other (See Comments)     Sun exposure, due to history of radiation from cancer symptoms nausea and red skin, welts  blisters     Penicillins      \"Flushed\" Per pt.      Tizanidine      Amoxicillin Rash       ROS: 10 point ROS were all negative except for pertinent positives noted in my HPI.    Physical exam:   Blood pressure 114/78, pulse 95, temperature 99.3  F (37.4  C), temperature source Oral, resp. rate 16, height 1.651 m (5' 5\"), weight 115.4 kg (254 lb 8 oz), SpO2 95 %.  CV: HR and BP as noted above  PULM: breathing comfortably on room air  ABD: soft, non-distended  NEUROLOGIC:  -- Awake; Alert; oriented x 3  -- Follows commands briskly  -- Speech fluent, spontaneous. No aphasia or dysarthria.  -- no gaze preference. No apparent hemineglect.  Cranial Nerves:  -- Left visual field cut, PERRL 3-2mm bilat and brisk, extraocular movements " intact  -- face symmetrical, tongue midline  -- palate elevates symmetrically, uvula midline  -- hearing grossly intact bilat  -- Trapezii 5/5 strength bilat symmetric    Motor:  Normal bulk / tone; no tremor, rigidity, or bradykinesia.  No muscle wasting or fasciculations  No Pronator Drift     Delt Bi Tri Hand Flexion/  Extension Iliopsoas Quadriceps Hamstrings Tibialis Anterior Gastroc    C5 C6 C7 C8/T1 L2 L3 L4-S1 L4 S1   R 5 5 5 5 5 5 5 5 5   L 5 5 5 5 5 5 5 5 5   Sensory:  intact to LT x 4 extremities     Reflexes:     Bi BR Tian Pat Ach Bab    C5-6 C6 UMN L2-4 S1 UMN   R 2+ 2+ Norm 2+ 2+ Norm   L 2+ 2+ Norm 2+ 2+ Norm     Gait: Deferred      LABS:  Recent Labs   Lab 12/16/20  0203      POTASSIUM 4.2   CHLORIDE 110*   CO2 26   ANIONGAP 5   GLC 96   BUN 14   CR 0.81   AUREA 9.1       Recent Labs   Lab 12/16/20  0203   WBC 8.8   RBC 4.42   HGB 13.3   HCT 41.4   MCV 94   MCH 30.1   MCHC 32.1   RDW 12.8          ASSESSMENT:  45 year old female with increasing meningioma size in the occipital lobe originating from the falx.    RECOMMENDATIONS:  No neurosurgical intervention indicated at this time   NPO  mIVF while NPO  Admission labs and pre-op labs  Stealth MRI with fiducials  OR for resection in the afternoon    The patient was discussed with Dr. Rdz, neurosurgery chief resident, and they agree with the above.    Berkley Paul MD  Neurosurgery Resident, PGY-2    Please contact neurosurgery resident on call with questions.    Dial * * [phone], enter [phone] when prompted.

## 2020-12-16 NOTE — ANESTHESIA PROCEDURE NOTES
Arterial Line Procedure Note      Staff -   Anesthesiologist:  Shamar Arteaga MD  CRNA: Matheus Centeno APRN CRNA  Performed By: CRNA  Procedure performed by resident/CRNA in presence of a teaching physician.      Location: In OR After Induction  Procedure Start/Stop Times:     patient identified      Correct Patient: Yes      Correct Position: Yes      Correct Site: Yes      Correct Procedure: Yes      Correct Laterality:  Yes    Site Marked:  Yes  Line Placement:     Procedure:  Arterial Line    Insertion Site:  Brachial    Insertion laterality:  Right    Skin Prep: Chloraprep      Patient Prep: patient draped, mask, sterile gloves and hand hygiene      Local skin infiltration:  None    Ultrasound Guided?: Yes      Artery evaluated via ultrasound confirming patency.   Using realtime imaging, the artery was punctured and the needle was observed entering the artery.      A permanent image is entered into patient's chart.      Catheter size:  20 gauge, 12 cm    Cath secured with: suture      Dressing:  Tegaderm    Arterial waveform: Yes      IBP within 10% of NIBP: Yes

## 2020-12-16 NOTE — ANESTHESIA PREPROCEDURE EVALUATION
Anesthesia Evaluation     . Pt has had prior anesthetic. Type: General and MAC (slow awakening; nausea delayed discharge)    History of anesthetic complications   - PONV        ROS/MED HX    ENT/Pulmonary:     (+)allergic rhinitis, Mild Persistent asthma Last exacerbation: years,Treatment: Inhaled steroids and Inhaler daily,  , . .    Neurologic: Comment: Fibromyalgia  Chronic back and neck pain - neg neurologic ROS     Cardiovascular:         METS/Exercise Tolerance: Comment: Can get wheezy with stairs >4 METS   Hematologic:     (+) History of Transfusion no previous transfusion reaction -      Musculoskeletal:   (+) arthritis (rheumatoid arthritis),  other musculoskeletal- hx rheumatoid arthritis; no biologics or methotrxate      GI/Hepatic:     (+) GERD       Renal/Genitourinary:  - ROS Renal section negative       Endo:     (+) Obesity, .      Psychiatric:     (+) psychiatric history depression      Infectious Disease:   (+) Other Infectious Disease previous UTI treated with ciprofloxacin      Malignancy:   (+) Malignancy   Neuro CA Active status post.          Other: Comment: Chari Coates is a 45 year old female with a past medical history of ALL s/p chemo and radiation when she was a child.  She subsequently developed multiple meningiomas s/p resection with Dr. Lau and 2017.  Recently developed  blurry vision and imaging showed interval increase in her known meningioma.  Her visual symptoms have since resolved,currently scheduled for resection of the growing meningioma.   Allergies:     -- Aspirin -- Other (See Comments)    --  D/t ulcer   -- Augmentin    -- Azithromycin    -- Bactrim (Sulfamethoxazole W/Trimethoprim)    -- Cefdinir    -- Cortisone    -- Erythromycin    -- Flonase (Fluticasone)    -- Iodine     --  IV   -- Lactose -- Nausea   -- Methylprednisolone    -- Naltrexone-Bupropion Hcl Er -- Nausea   -- No Clinical Screening - See Comments -- Other (See Comments)    --  Sun exposure, due to  "history of radiation from             cancer symptoms nausea and red skin, weltsblisters   -- Penicillins     --  \"Flushed\" Per pt.   -- Tizanidine    -- Amoxicillin -- Rash   -- Liquid Adhesive -- Rash   -- Skin Adhesives -- Other (See Comments) and Rash    --  blisters   (+) H/O Chronic Pain,                     Procedure: Procedure(s):  RIGHT CRANIOTOMY, USING OPTICAL TRACKING SYSTEM, WITH NEOPLASM EXCISION    PMHx/PSHx:  Past Medical History:   Diagnosis Date     ALL (acute lymphocytic leukemia) (H)      Cerebral meningioma (H)      Chronic constipation      Depression      Fibromyalgia      Gastroesophageal reflux disease      IBS (irritable bowel syndrome)      Osteoporosis      Rheumatoid arthritis (H)      Uncomplicated asthma        Past Surgical History:   Procedure Laterality Date     ARTHROSCOPY KNEE Right      CARPAL TUNNEL RELEASE RT/LT Left     left thumb release     EXCISE GANGLION HAND Right      LAPAROSCOPIC NISSEN FUNDOPLICATION       OPTICAL TRACKING SYSTEM CRANIOTOMY, EXCISE TUMOR, COMBINED Right 3/30/2017    Procedure: COMBINED OPTICAL TRACKING SYSTEM CRANIOTOMY, EXCISE TUMOR;  Surgeon: Ángel Lau MD;  Location: [facility] OR     TOE SURGERY Left      TONSILLECTOMY              [COMPLETED] acetaminophen (TYLENOL) tablet 650 mg       [COMPLETED] acetaminophen (TYLENOL) tablet 650 mg       [Auto Hold] albuterol (PROAIR HFA/PROVENTIL HFA/VENTOLIN HFA) 108 (90 Base) MCG/ACT inhaler 2 puff       clindamycin (CLEOCIN) infusion 900 mg       clindamycin (CLEOCIN) infusion 900 mg       [Auto Hold] diphenhydrAMINE (BENADRYL) capsule 25-50 mg       [Auto Hold] gabapentin (NEURONTIN) capsule 300 mg       [COMPLETED] gadobutrol (GADAVIST) injection 10 mL       HOLD ace-inhibitors and angiotension-receptor blockers on day of surgery       [Auto Hold] hydrALAZINE (APRESOLINE) injection 10-20 mg       [Auto Hold] labetalol (NORMODYNE/TRANDATE) injection 10-40 mg       [Auto Hold] lidocaine (LMX4) " cream       [Auto Hold] lidocaine 1 % 0.1-1 mL       [COMPLETED] LORazepam (ATIVAN) injection 2 mg       [Auto Hold] methocarbamol (ROBAXIN) tablet 500 mg       [Auto Hold] ondansetron (ZOFRAN-ODT) ODT tab 4-8 mg    Or       [Auto Hold] ondansetron (ZOFRAN) injection 4-8 mg       [Auto Hold] prochlorperazine (COMPAZINE) injection 10 mg    Or       [Auto Hold] prochlorperazine (COMPAZINE) tablet 10 mg    Or       [Auto Hold] prochlorperazine (COMPAZINE) suppository 25 mg       [Auto Hold] sodium chloride (PF) 0.9% PF flush 3 mL       [Auto Hold] sodium chloride (PF) 0.9% PF flush 3 mL       sodium chloride 0.9% infusion       [Auto Hold] venlafaxine (EFFEXOR-XR) 24 hr capsule 75 mg         albuterol (PROAIR HFA/PROVENTIL HFA/VENTOLIN HFA) 108 (90 BASE) MCG/ACT Inhaler, Inhale 2 puffs into the lungs 2 times daily        baclofen (LIORESAL) 10 MG tablet, Take 10 mg by mouth 2 times daily       Budesonide-Formoterol Fumarate (SYMBICORT IN), Inhale 2 puffs into the lungs 2 times daily        Cholecalciferol (VITAMIN D PO), Take 50,000 Units by mouth twice a week On Monday and Friday       diphenhydrAMINE (BENADRYL) 25 MG capsule, Take by mouth as needed       gabapentin (NEURONTIN) 100 MG capsule, Take 100mg in the morning, 100mg in the afternoon, and 300mg in the evening.       medroxyPROGESTERone (DEPO-PROVERA) 150 MG/ML injection, Inject 150 mg into the muscle every 3 months       omeprazole (PRILOSEC) 20 MG DR capsule, Take 20 mg by mouth daily       venlafaxine (EFFEXOR-XR) 37.5 MG 24 hr capsule, Take 37.5 mg by mouth daily Take with 75mg ER capsule for total daily dose of 112.5mg       venlafaxine (EFFEXOR-XR) 75 MG 24 hr capsule, Take 75 mg by mouth every morning Take with 37.5mg ER capsule for total daily dose of 112.5mg daily        Social Hx:   Social History     Tobacco Use     Smoking status: Never Smoker   Substance Use Topics     Alcohol use: No       Allergies:   Allergies   Allergen Reactions     Aspirin  "Other (See Comments)     D/t ulcer     Augmentin      Azithromycin      Bactrim [Sulfamethoxazole W/Trimethoprim]      Cefdinir      Cortisone      Erythromycin      Flonase [Fluticasone]      Iodine      IV     Lactose Nausea     Methylprednisolone      Naltrexone-Bupropion Hcl Er Nausea     No Clinical Screening - See Comments Other (See Comments)     Sun exposure, due to history of radiation from cancer symptoms nausea and red skin, welts  blisters     Penicillins      \"Flushed\" Per pt.      Tizanidine      Amoxicillin Rash     Liquid Adhesive Rash     Skin Adhesives Other (See Comments) and Rash     blisters         PHYSICAL EXAM:   Mental Status/Neuro:    Airway: Facies: Feasible  Mouth/Opening: Not Assessed  TM distance: Not Assessed  Neck ROM: Not Assessed   Respiratory: Auscultation: CTAB     Resp. Rate: Normal     Resp. Effort: Normal      CV: Rhythm: Regular  Rate: Age appropriate  Heart: Normal Sounds  Edema: None   Comments:                      Assessment:   ASA SCORE: 3    H&P: History and physical reviewed and following examination; no interval change.   Smoking Status:  Non-Smoker/Unknown   NPO Status: NPO Appropriate     Plan:   Anes. Type:  General   Pre-Medication: Midazolam   Induction:  IV (Standard)   Airway: ETT; Oral   Access/Monitoring: PIV; 2nd PIV; A-Line   Maintenance: Balanced     Postop Plan:   Postop Pain: Opioids  Postop Sedation/Airway: Not planned  Disposition: Inpatient/Admit     PONV Management:   Adult Risk Factors: Female, Non-Smoker, Postop Opioids   Prevention: Ondansetron, Dexamethasone     CONSENT: Direct conversation   Plan and risks discussed with: Patient                "

## 2020-12-16 NOTE — PLAN OF CARE
[id number]  Status: Pt arrived from [place] last evening around midnight. Admitted for vision changes.  Vitals: VSS on RA.  Neuros: A&Ox4. Denies N/T. 5/5 all extremities. L field cut. Pt denies blurry, double vision, or photophobia.  IV: PIV infusing NS @ 100 mL/hr.  Resp/trach: Stable on RA. LS clear all fields.  Diet: NPO.  Bowel status: No BM overnight. BS active all quads. Last BM: 12/15/20.  : Voids spontaneously.  Skin: Skin intact.  Pain: Denies.  Activity: Up w/ SBA. IND in room.  Social: Brother Glen called asking for an update after pt gets out of surgery, [phone].  Plan: Plan for OR at 1510 for R crani. COVID test and MRI to be completed. Will continue to monitor and follow POC.

## 2020-12-17 NOTE — PROGRESS NOTES
Admitted/transferred from: PACU  Reason for admission/transfer: s/p tumor resection  2 RN skin assessment: completed by Gael RN and LORENZA Puri  Result of skin assessment and interventions/actions: posterior craniotomy site intact with marked old drainage on dressing.   Height, weight, drug calc weight: Done  Patient belongings (see Flowsheet)    ?

## 2020-12-17 NOTE — PROGRESS NOTES
Major Shift Events:  Neuro exam unchanged overnight- PERRLA, patient following commands, moves all extremities 5/5 strength, A&O x4, baseline left field cut present per report. Cardiac rhythm remained sinus rhythm/sinus tachycardia with HR in low 100's overnight, afebrile, pulses easily palpable. SpO2 WNL on 2L NC, able to swallow PO medications without difficulty, gu intact with adequate output. Craniotomy site covered with drainage marked and unchanged.     Plan: Continue Q1 hr neuro exams, will update team with acute changes/concerns.   For vital signs and complete assessments, please see documentation flowsheets.

## 2020-12-17 NOTE — ANESTHESIA POSTPROCEDURE EVALUATION
Anesthesia POST Procedure Evaluation    Patient: Chari Coates   MRN:     [id number] Gender:   female   Age:    45 year old :      1975        Preoperative Diagnosis: Meningioma (H) [D32.9]   Procedure(s):  RIGHT CRANIOTOMY, USING OPTICAL TRACKING SYSTEM, WITH NEOPLASM EXCISION   Postop Comments: No value filed.     Anesthesia Type: General       Disposition: Admission; ICU            ICU Sign Out: Anesthesiologist/ICU physician sign out WAS performed   Postop Pain Control: Uneventful            Sign Out: Well controlled pain   PONV: No   Neuro/Psych: Uneventful            Sign Out: Acceptable/Baseline neuro status   Airway/Respiratory: Uneventful            Sign Out: Acceptable/Baseline resp. status   CV/Hemodynamics: Uneventful            Sign Out: Acceptable CV status   Other NRE: NONE   DID A NON-ROUTINE EVENT OCCUR? No         Last Anesthesia Record Vitals:  CRNA VITALS  2020 2306 - 2020 2351      2020             ART BP:  149/76    ART Mean:  112          Last PACU Vitals:  Vitals Value Taken Time   BP     Temp     Pulse 108 20 2350   Resp     SpO2 92 % 20 2350   Temp src     NIBP     Pulse     SpO2     Resp     Temp     Ht Rate     Temp 2     Vitals shown include unvalidated device data.      Electronically Signed By: Shamar Arteaga MD, 2020, 11:51 PM

## 2020-12-17 NOTE — PROGRESS NOTES
[facility], [place]   12/17/2020  Neurosurgery Progress Note:    Assessment:  Chari Coates is a 45 year old female who is now s/p right craniotomy for meningioma resection.    Plan:  - Serial neuro exams  - 10 day dex taper  - MRI today  - Pain control  - HOB > 30 degrees  - Advance diet as tolerated  - Bowel regimen  - PRN antiemetics  - IVF until taking adequate PO  - PT/OT  - SCDs for DVT proph    -----------------------------------  Berkley Paul MD  Neurosurgery Resident, PGY-2    Please contact neurosurgery resident on call with questions.    Dial * * [phone], enter [phone] when prompted.   -----------------------------------    Interval History: OR yesterday    Objective:   Temp:  [97.6  F (36.4  C)-99.2  F (37.3  C)] 98.5  F (36.9  C)  Pulse:  [] 87  Resp:  [16-21] 18  BP: (110-126)/(61-81) 112/73  MAP:  [85 mmHg-109 mmHg] 90 mmHg  Arterial Line BP: (110-139)/(70-86) 118/70  SpO2:  [90 %-97 %] 95 %  I/O last 3 completed shifts:  In: 1835 [I.V.:1835]  Out: 1520 [Urine:1170; Blood:350]    Gen: Appears comfortable, NAD  Wound: clean, dry, intact  Neurologic:  -- Awake; Alert; oriented x 3  -- Follows commands briskly  -- Speech fluent, spontaneous. No aphasia or dysarthria.  -- no gaze preference. No apparent hemineglect.  Cranial Nerves:  -- Left visual field cut, PERRL 3-2mm bilat and brisk, extraocular movements intact  -- face symmetrical, tongue midline  -- palate elevates symmetrically, uvula midline  -- hearing grossly intact bilat  -- Trapezii 5/5 strength bilat symmetric     Motor:  Normal bulk / tone; no tremor, rigidity, or bradykinesia.  No muscle wasting or fasciculations  No Pronator Drift       Delt Bi Tri Hand Flexion/  Extension Iliopsoas Quadriceps Hamstrings Tibialis Anterior Gastroc     C5 C6 C7 C8/T1 L2 L3 L4-S1 L4 S1   R 5 5 5 5 5 5 5 5 5   L 5 5 5 5 5 5 5 5 5   Sensory:  intact to LT x 4 extremities      Reflexes:       Bi BR Tian Pat Ach Bab     C5-6  C6 UMN L2-4 S1 UMN   R 2+ 2+ Norm 2+ 2+ Norm   L 2+ 2+ Norm 2+ 2+ Norm      Gait: Deferred    LABS:  Recent Labs   Lab 12/17/20  0600 12/16/20  0203    141   POTASSIUM 4.0 4.2   CHLORIDE 112* 110*   CO2 25 26   ANIONGAP 4 5   * 96   BUN 12 14   CR 0.72 0.81   AUREA 8.3* 9.1       Recent Labs   Lab 12/17/20  0600   WBC 11.1*   RBC 3.54*   HGB 10.6*   HCT 32.4*   MCV 92   MCH 29.9   MCHC 32.7   RDW 12.3          IMAGING:  Recent Results (from the past 24 hour(s))   MR Brain w Contrast    Narrative    Brain MRI with/without contrast primarily for the purposes of  stereotactic evaluation    History: Tumor protocol AND stealth protocol with fiducials, thin cuts    Comparison: MR 12/10/2020    Technique: MR imaging performed with 3-dimensonally acquired axial  T1-weighted sequences performed with intravenous contrast.    Contrast: 10mL gadavist    Findings: Limited imaging for stereotactic imaging demonstrates no  midline shift or hydrocephalus. The major intracranial arterial  structures are grossly patent.    Postsurgical changes of prior meningioma resection including including  encephalomalacia of the right occipital region.   Multiple dural based enhancing lesions, are unchanged from most recent  outside MRI:  -3.0 x 2.1 x 2.9 cm lobulated mass overlying the right occipital lobe  at the prior resection cavity  -5 x 9 mm overlying the right frontal lobe mass arising from the falx  (series 2, image 146)  -1.2 x 0.9 cm overlying the left frontal lobe mass with minimal local  mass effect (series 2, image 125)  -1.2 x 1.1 cm mass overlying the left anterior frontal lobe (series 2,  image 114)  -8 x 7 mm mass originating from the falx posteriorly (series 2, image  112)  -1.5 x 1.8 cm mass arising from the dura overlying the left parietal  lobe (series 2, image 120)  -4.0 x 4.0 mm mass in the left internal auditory canal (series 2,  image 57)      Impression    Impression:   1. Several dural based solid  lesions as described above, likely  meningiomas with largest measuring 3 x 2.1 x 2.9 cm overlying the  medial right occipital lobe and area of encephalomalacia.   2. Left internal auditory canal vestibular schwannoma, unchanged.  3. Limited imaging primarily for the purposes of stereotactic  localization    I have personally reviewed the examination and initial interpretation  and I agree with the findings.    MILTON ALARCON MD   XR Chest Port 1 View    Narrative    Exam: Chest radiograph 12/17/2020, 12/17/2020 1:11 AM    Indication: Line placement.    Comparison: Chest x-ray 3/31/2017.    Findings:   Portable AP semiupright radiograph of the chest. Right internal  jugular central catheter tip terminates in the right atrium.    Trachea is midline. Heart size is normal.    Pulmonary vasculature is distinct. Retrocardiac and left basilar  opacities. No pleural effusion or pneumothorax.    Visualized upper abdomen is unremarkable. No acute osseous  abnormality. Visualized soft tissues are within normal limits.      Impression    Impression:   1. Right internal jugular central catheter tip terminates in the right  atrium.  2. Increased left basilar retrocardiac opacities which may represent  atelectasis versus infection.    I have personally reviewed the examination and initial interpretation  and I agree with the findings.    EMILY PALMA MD

## 2020-12-17 NOTE — ANESTHESIA CARE TRANSFER NOTE
Patient: Chari Coates    Procedure(s):  RIGHT CRANIOTOMY, USING OPTICAL TRACKING SYSTEM, WITH NEOPLASM EXCISION    Diagnosis: Meningioma (H) [D32.9]  Diagnosis Additional Information: No value filed.    Anesthesia Type:   General     Note:  Airway :Face Mask  Patient transferred to:PACU  Comments: VSS. Breathing spontaneously at a regular rate with adequate tidal volumes and maintaining O2 sats on 8L facemask. Denies nausea or pain. No apparent complications from anesthesia. No gross neuro deficits noted on emergence of anesthesia. Neurosurgery resident bedside for full neuro assessment.   Chest XR ordered to verify placement of central venous line.   Verbal report given to PACU nurse.     Genia Conti MD  CA3 Anesthesia Resident  Handoff Report: Identifed the Patient, Identified the Reponsible Provider, Reviewed the pertinent medical history, Discussed the surgical course, Reviewed Intra-OP anesthesia mangement and issues during anesthesia, Set expectations for post-procedure period and Allowed opportunity for questions and acknowledgement of understanding      Vitals: (Last set prior to Anesthesia Care Transfer)    CRNA VITALS  12/16/2020 2306 - 12/16/2020 2358      12/16/2020             ART BP:  149/76    ART Mean:  112                Electronically Signed By: Genia Cole MD  December 16, 2020  11:58 PM

## 2020-12-17 NOTE — BRIEF OP NOTE
"[facility]     Brief Operative Note    Pre-operative diagnosis: Meningioma (H) [D32.9]  Post-operative diagnosis Meningioma (H) [D32.9]    Procedure: Procedure(s):  RIGHT CRANIOTOMY, USING OPTICAL TRACKING SYSTEM, WITH NEOPLASM EXCISION  Surgeon: Surgeon(s) and Role:     * Ángel Lau MD - Primary     * Ke Mehta MD - Resident - Assisting  Anesthesia: General   Estimated blood loss: 150cc  Drains: None  Specimens:   ID Type Source Tests Collected by Time Destination   A : FALX TUMOR Tissue Brain SURGICAL PATHOLOGY EXAM Ángel Lau MD 12/16/2020 10:41 PM    B : FALX TUMOR FROM SONOPET Tissue Brain SURGICAL PATHOLOGY EXAM Ángel Lau MD 12/16/2020 10:42 PM      Findings:  Tumor attached to falx. Difficult to obtain clear margin along superior edge.  Complications: None.  Implants:   Implant Name Type Inv. Item Serial No.  Lot No. LRB No. Used Action   IMP BUR HOLE COVER 17MM LOW PROFILE .527 Metallic Hardware/[place] IMP BUR HOLE COVER 17MM LOW PROFILE .527  [facility]  Right 1 Explanted   IMP PLATE SYN BOX LOW PROFILE [id number] 04H .521 Metallic Hardware/[place] IMP PLATE SYN BOX LOW PROFILE [id number] 04H .521  [employer]  Right 1 Explanted   IMP PLATE SYN STR DOG BONE LOW PROFILE 2H .502 Metallic Hardware/[place] IMP PLATE SYN STR DOG BONE LOW PROFILE 2H .502  [facility]  Right 1 Explanted   IMP SCR SYN MATRIX LOW PRO 1.5X04MM SELF DRILL [id number] Metallic Hardware/Panna [place] IMP SCR SYN MATRIX LOW PRO 1.5X04MM SELF DRILL [id number]  [facility]  Right 8 Explanted   GRAFT DURAGEN 2X2\"  Bone/Tissue/Biologic GRAFT DURAGEN 2X2\"   [employer] [id number] Right 1 Implanted   IMP PLATE SYN STR DOG BONE LOW PROFILE 2H .502 Metallic Hardware/[place] IMP PLATE SYN STR DOG BONE LOW PROFILE 2H .502 N/A [facility]  Right 2 Implanted   IMP " PLATE SYN BOX LOW PROFILE 04H .511 Metallic Hardware/[place] IMP PLATE SYN BOX LOW PROFILE 04H .511 N[facility]  Right 3 Implanted   IMP SCR SYN MATRIX LOW PRO 1.5X04MM SELF DRILL [id number] Metallic Hardware/[place] IMP SCR SYN MATRIX LOW PRO 1.5X04MM SELF DRILL [id number] N/A [facility]  Right 13 Implanted   IMP SCR SYN MATRIX LOW PRO 1.5X04MM SELF DRILL [id number] Metallic Hardware/[place] IMP SCR SYN MATRIX LOW PRO 1.5X04MM SELF DRILL [id number] N/A [facility]  Right 1 Wasted

## 2020-12-17 NOTE — OP NOTE
Procedure Date: 12/16/2020      PREOPERATIVE DIAGNOSES:     1.  Occipital meningioma.   2.  Meningiomatosis.      POSTOPERATIVE DIAGNOSES:     1.  Occipital meningioma.   2.  Meningiomatosis.      PROCEDURE:     1.  Redo right craniotomy for tumor excision.   2.  Use of intraoperative Stealth navigation.   3.  Use of intraoperative microscope.   4.  Use of intraoperative Sonopet.      ATTENDING SURGEON:  Ángel Lau MD.      RESIDENT SURGEON:  Ke Mehta MD.      ESTIMATED BLOOD LOSS:  150 mL.      ANESTHESIA:  General.      FINDINGS:  Hemorrhagic tumor attached to the falx.  It is difficult to obtain a dissection plane along the superior edge of the tumor.  Multiple samples were sent for permanent pathology.      INDICATIONS FOR OPERATION:  Ms. Coates is a 45-year-old female with a history of status post chemo and radiation when she was a child.  She subsequently developed meningiomatosis.  Her right occipital meningioma had previously been resected in 2017.  We have been monitoring her with surveillance imaging.  More recently, this right occipital parafalcine meningioma began to grow in an accelerated fashion.  The patient complained of blurry vision; however, no change in her field cut per her subjective reporting.  Given the accelerated growth and possible visual disturbances, it was recommended that she undergo surgical resection.      PROCEDURE IN DETAIL:  The patient was marked and consented in her hospital room and brought down to the operating room on a gurney.  General anesthesia was induced and the patient was intubated.  A central line, an arterial line and Flynn were all placed.  A Arreola headholder was placed.  The patient was then rotated prone and the Arreola head esquivel was attached to the bed.  All pressure points were adequately padded and she was secured to the bed.  We then rotated the bed 180 degrees away from our Anesthesia colleagues.  The patient's Stealth MRI was loaded into our  navigation system.  Using a combination of fiducial touch registration and contact registration, we registered the Stealth system to the patient's head.  We achieved good registration and proceeded.  Her prior incision was apparent.  We split the hair and marked her prior incision.  Using surgical clippers, we then removed a strip of hair along her prior incision.  In the event that we needed to T her incision for greater exposure, we also shaved a small section in which we could T out laterally.  The patient was then prepped and draped in normal sterile fashion and 10 mL of local with epinephrine was injected along the intended incision lines.      A timeout was called, confirming the patient's identity, laterality of procedure and the procedure intended.  A #10 blade was used to open the skin.  Monopolar cautery was then used to carry the dissection down to the cranium.  A joker periosteal elevator was then used to elevate the periosteum.  Cranial closure instrumentation was immediately apparent.  Monopolar cautery was used to clear off the galea that was scarred to the closure implants.  Inferiorly, the cranium had not fully healed, leaving a defect with exposed dura.  We very carefully dissected this using a mosquito and a monopolar.  With the skin fully opened and reflected, we then removed the cranial closure instrumentation.  Using a combination of a #1 curet and a joker, we then worked along the inferior edge of the prior craniotomy site to strip the dura.  We then checked our positioning using the Stealth navigation.  We could see that the corridor that we wished to take was along the inferior edge of the prior craniotomy site.  We did not feel that we needed to open up her entire prior craniotomy as the superior leg of the cuts had fused back together.  We then mya out a smaller craniotomy.  Using a B1 with a footplate, we then made these cuts.  The smaller cranial flap was then rotated off, ensuring  stripping the dura.  This was set aside.      Using a 15 blade, the dura was then opened.  Using a dental and a #15 blade, we completed our durotomy.  Tenotomy scissors were also used to extend the durotomy such that we had additional visualization.  Using 4-0 Nurolon, we then tacked the dural leaflets in the open position.  We again checked our position using Stealth navigation.  At this point, the microscope was brought to the field and the remainder of the procedure was performed under the microscope.  Our corridor was immediately apparent.  Arachnoid webs were clearly visible and we took these down using a combination of blunt dissection and sharp dissection.  As we worked down the corridor, we then encountered the tumor along the falx.  The tumor was highly susceptible to hemorrhage with contact.  We meticulously maintained hemostasis then worked around the tumor.  There was a good dissection plane inferiorly, medially and laterally.  Initially as we were working superiorly, there was also a good dissection plane; however, as we continued to work around the side of the tumor, our dissection plane was lost.  Given the overall size, we then used a Sonopet to core out the tumor.  We injected 1 mL of alcohol into the tumor to try to obtain hemostasis.  This was largely unsuccessful.  Bipolar was used to maintain meticulous hemostasis when the tumor was cored out.  We were then able to reflect the inferior, medial and lateral edges of tumor towards the center and work underneath the tumor.  We found multiple feeders which were supplying the tumor along the falx as well as out along laterally towards the brain.  These were coagulated with bipolar cautery and then cut.  We had completed as much of the work along the lateral, medial, inferior edges as possible, so we then removed these quadrants en bloc and sent them to Pathology for analysis  with the other pieces of tumor were removed, the superior portion then  migrated down into the field and we were able to dissect along it as best able.  Again, it was difficult to find a clear plane to follow; however, we did our best to avoid any disruption to adjacent brain.  We frequently checked our progress using the intraoperative Stealth.  Once it appeared that we had removed as much tumor as safely possible, we then coagulated the residual portions, particularly along the superior edge.  We then also coagulated the falx and any small little remnants.  Hydrogen peroxide coated cotton balls were then placed in the wound and allowed to obtain hemostasis, then removed them.  We then used the Stealth navigation one last time to check all of our margins and it appeared that we had achieved a good resection.  The microscope was then removed from the field.  We irrigated the wound.  The irrigation was clear.  A Valsalva was performed and we do not see any additional bleeding.  We then lined the surgical cavity with Surgicel.      Using 4-0 Nurolon, we then reapproximated the dural leaflets.  We were not able to obtain a watertight closure, so a piece of Duragen was then placed over the closure.  The bone flap was then returned to the field and reattached to the native skull using titanium plates and screws.  The wound was copiously irrigated.      We then turned our attention to closure.  The galea was closed using 2-0 Vicryl sutures in an inverted interrupted fashion.  Skin was closed using 3-0 nylon in a running fashion.  The wound was cleaned with a wet followed by a dry sponge.  ChloraPrep was applied to the incision.  Once dried, the wound was dressed with Primapore dressings.  The drapes were taken down and the head of bed was rotated back towards anesthesia.  The Arreola headholder was detached from the bed.  We then rotated back supine on a gurney.  The Arreola was removed.  General anesthesia was gently reversed and the patient was extubated.  The patient was then taken to  the postoperative care area for further postoperative cares.      All counts were correct at the end of the procedure x 2.  Dr. Ángel Morales was present and scrubbed throughout all critical portions of the case and otherwise immediately available.         ÁNGEL MORALES MD       As dictated by AURE MITCHELL MD            D: 2020   T: 2020   MT: ZHANE      Name:     RICK VERDUGO   MRN:      [id number]        Account:        [id number]   :      1975           Procedure Date: 2020      Document: [id number]

## 2020-12-18 NOTE — PROGRESS NOTES
Transferred to:  at 2115  Belongings: 2 belongings bags   Flynn removed? Yes  Central line removed? yes  Chart and medications sent with patient: yes  Family notified: yes; brother notified

## 2020-12-18 NOTE — PROGRESS NOTES
[facility], [place]   12/18/2020  Neurosurgery Progress Note:    Assessment:  Chari Coates is a 45 year old female who is now s/p right craniotomy for meningioma resection on 12/16.    Plan:  - Serial neuro exams  - 10 day dex taper  - MRI today  - Pain control  - HOB > 30 degrees  - Advance diet as tolerated  - Bowel regimen  - PRN antiemetics  - IVF until taking adequate PO  - PT/OT  - SCDs for DVT proph    -----------------------------------  Berkley Paul MD  Neurosurgery Resident, PGY-2    Please contact neurosurgery resident on call with questions.    Dial * * [phone], enter [phone] when prompted.   -----------------------------------    Interval History: Transferred to floor    Objective:   Temp:  [98.5  F (36.9  C)-99.1  F (37.3  C)] 99.1  F (37.3  C)  Pulse:  [] 96  Resp:  [16] 16  BP: (100-126)/(57-71) 126/70  MAP:  [74 mmHg-99 mmHg] 74 mmHg  Arterial Line BP: (108-127)/(57-79) 108/57  SpO2:  [90 %-96 %] 96 %  I/O last 3 completed shifts:  In: 1000 [I.V.:1000]  Out: 1650 [Urine:1650]    Gen: Appears comfortable, NAD  Wound: clean, dry, intact  Neurologic:  -- Awake; Alert; oriented x 3  -- Follows commands briskly  -- Speech fluent, spontaneous. No aphasia or dysarthria.  -- no gaze preference. No apparent hemineglect.  Cranial Nerves:  -- Left visual field cut, PERRL 3-2mm bilat and brisk, extraocular movements intact  -- face symmetrical, tongue midline  -- palate elevates symmetrically, uvula midline  -- hearing grossly intact bilat  -- Trapezii 5/5 strength bilat symmetric     Motor:  Normal bulk / tone; no tremor, rigidity, or bradykinesia.  No muscle wasting or fasciculations  No Pronator Drift       Delt Bi Tri Hand Flexion/  Extension Iliopsoas Quadriceps Hamstrings Tibialis Anterior Gastroc     C5 C6 C7 C8/T1 L2 L3 L4-S1 L4 S1   R 5 5 5 5 5 5 5 5 5   L 5 5 5 5 5 5 5 5 5   Sensory:  intact to LT x 4 extremities      Reflexes:       Bi BR Tian Pat Ach Bab     C5-6  C6 UMN L2-4 S1 UMN   R 2+ 2+ Norm 2+ 2+ Norm   L 2+ 2+ Norm 2+ 2+ Norm      Gait: Deferred    LABS:  Recent Labs   Lab 12/18/20  0626 12/17/20  0600 12/16/20  0203    140 141   POTASSIUM 4.1 4.0 4.2   CHLORIDE 114* 112* 110*   CO2 22 25 26   ANIONGAP 7 4 5   * 130* 96   BUN 10 12 14   CR 0.62 0.72 0.81   AUREA 8.5 8.3* 9.1       Recent Labs   Lab 12/18/20  0626   WBC 13.3*   RBC 3.73*   HGB 11.5*   HCT 34.6*   MCV 93   MCH 30.8   MCHC 33.2   RDW 12.5          IMAGING:  No results found for this or any previous visit (from the past 24 hour(s)).

## 2020-12-18 NOTE — PLAN OF CARE
6A PT - HOLD. PT orders acknowledged and appreciated. Per chart review and discussion with OT, pt mobilizing well, SBA - steady on feet. Pt has stairs therefore, OT to screen for PT needs. OT to follow to address mobility and ADLs. Potential need for 1 therapy discipline to follow while IP.

## 2020-12-18 NOTE — PROGRESS NOTES
12/18/20 0800   Quick Adds   Type of Visit Initial Occupational Therapy Evaluation       Present no   Language english   Living Environment   People in home alone   Current Living Arrangements house   Home Accessibility stairs to enter home;stairs within home   Number of Stairs, Main Entrance 3   Number of Stairs, Within Home, Primary 7  (7 up and 7 down)   Transportation Anticipated family or friend will provide   Living Environment Comments Pt lives in a split level home by herself. Pt has a tub/shower combo in the bathroom with grab bars. Pt has colored tape around the home to assist with vision deficits   Self-Care   Usual Activity Tolerance good   Current Activity Tolerance moderate   Regular Exercise No   Equipment Currently Used at Home grab bar, tub/shower;shower chair   Activity/Exercise/Self-Care Comment Pt was previoulsy independent/mod I with ADL completion   Disability/Function   Hearing Difficulty or Deaf no   Wear Glasses or Blind yes   Vision Management glasses   Concentrating, Remembering or Making Decisions Difficulty no   Difficulty Communicating no   Difficulty Eating/Swallowing no   Walking or Climbing Stairs Difficulty no   Dressing/Bathing Difficulty yes   Dressing/Bathing bathing difficulty, requires equipment   Toileting issues no   Doing Errands Independently Difficulty (such as shopping) no   Fall history within last six months no   Change in Functional Status Since Onset of Current Illness/Injury yes   General Information   Onset of Illness/Injury or Date of Surgery 12/16/20   Referring Physician Briana Boyer APRN CNP   Patient/Family Therapy Goal Statement (OT) To return home   Additional Occupational Profile Info/Pertinent History of Current Problem Chari Coates is a 45 year old female who is now s/p right craniotomy for meningioma resection on 12/16.   Existing Precautions/Restrictions other (see comments)  (crani precautions)   Left Upper  Extremity (Weight-bearing Status) other (see comments)  (10 lbs)   Right Upper Extremity (Weight-bearing Status) other (see comments)  (10 lbs)   Left Lower Extremity (Weight-bearing Status) full weight-bearing (FWB)   Right Lower Extremity (Weight-bearing Status) full weight-bearing (FWB)   Heart Disease Risk Factors Lack of physical activity;Overweight   General Observations and Info Activity: up with assist   Cognitive Status Examination   Orientation Status orientation to person, place and time   Affect/Mental Status (Cognitive) WNL   Follows Commands WNL   Cognitive Status Comments Pt is alert, oriented and generally appropriate in conversation. Will continue to monitor   Visual Perception   Visual Impairment/Limitations blurry vision;corrective lenses full-time   Impact of Vision Impairment on Function (Vision) Pt with baseline visual deficits, slight blurry vision, however stated her vision is near baseline. Pt has tape around her home on the stairs and corners of the counter tops   Sensory   Sensory Quick Adds No deficits were identified   Pain Assessment   Patient Currently in Pain No   Integumentary/Edema   Integumentary/Edema no deficits were identifed   Range of Motion Comprehensive   General Range of Motion no range of motion deficits identified   Comment, General Range of Motion BUE ROM WFL   Strength Comprehensive (MMT)   Comment, General Manual Muscle Testing (MMT) Assessment Not formally assess due to precautions   Bed Mobility   Bed Mobility supine-sit   Supine-Sit [place] (Bed Mobility) supervision   Transfers   Transfers sit-stand transfer;toilet transfer   Sit-Stand Transfer   Sit-Stand [place] (Transfers) supervision   Toilet Transfer   [place] Level (Toilet Transfer) supervision   Activities of Daily Living   BADL Assessment/Intervention grooming;toileting   Grooming Assessment/Training   [place] Level (Grooming) supervision   Toileting   [place] Level (Toileting)  supervision   Instrumental Activities of Daily Living (IADL)   Previous Responsibilities meal prep;housekeeping;laundry;shopping;medication management;finances   IADL Comments Pt was previously independent with IADL completion. Pt father and brother drive pt to the store and appointments. Pt family is able to assist as needed at discharge   Clinical Impression   Criteria for Skilled Therapeutic Interventions Met (OT) yes;meets criteria;skilled treatment is necessary   OT Diagnosis decreased activity tolerance and independence with ADL completion   OT Problem List-Impairments impacting ADL activity tolerance impaired;cognition;strength;post-surgical precautions   Assessment of Occupational Performance 5 or more Performance Deficits   Identified Performance Deficits g/h, toileting, bathing, dressing, functional mobility and cognition   Planned Therapy Interventions (OT) ADL retraining;IADL retraining;cognition;strengthening;home program guidelines;progressive activity/exercise   Clinical Decision Making Complexity (OT) low complexity   Therapy Frequency (OT) Daily   Predicted Duration of Therapy 1 week   Anticipated Equipment Needs Upon Discharge (OT) other (see comments)  (TBD)   Risks and Benefits of Treatment have been explained. Yes   Patient, Family & other staff in agreement with plan of care Yes   OT Discharge Planning    OT Discharge Recommendation (DC Rec) Home with assist   OT Rationale for DC Rec Pt is primarily SBA with ADL completion and functional mobility. Pt would benefit from continued skilled therapy while IP to increase activity tolerance and independence with IADL completion   OT Brief overview of current status  SBA for transfers and ambulation   Total Evaluation Time (Minutes)   Total Evaluation Time (Minutes) 5

## 2020-12-18 NOTE — PLAN OF CARE
Status: s/p posterior craniotomy with tumor resection  Vitals: VSS on RA, cont pulse ox overnight  Neuros: A&Ox4, strengths 5/5. Dizzy with standing, intermittent nausea  IV: 2 PIV SL  Diet: Regular, no intake overnight  Bowel status: BS+ no BM overnight  : Voids spontaneously  Skin: head dressing from crani, neck dressing from old midline.  Pain: Incisional pain. Tylenol given x1 overnight, Nausea. Given x1 zofran and x1 compazine  Activity: A1-2 to BSC  Plan: MRI today. Continue to monitor POC

## 2020-12-18 NOTE — PLAN OF CARE
Major Shift Events:  Neuro exam unchanged - PERRLA, patient following commands, moves all extremities 5/5 strength, A&O x4, baseline left field cut present per report. 2L oxymask PRN, able to swallow PO medications without difficulty. Lines removed today per patient care order.   Plan: 6A status - transferring around 1930 per report.   For vital signs and complete assessments, please see documentation flowsheets.

## 2020-12-18 NOTE — PLAN OF CARE
Arrived from:  4A  Belongings/meds:  2 belonging bags. Inhaler. Other meds in bin.   2 RN Skin Assessment Completed by: Malia RN, Demetrice RN.   Non-intact findings documented (yes/no/NA): Posterior head dressing from crani. R neck dressing from prior access. R arm dressing from previous PIV. Old, scarred LP sites on back. L foot dressing from previous PIV.     Pt is s/p redo R crani for tumor resection 12/16. Pt is A&Ox4. Denies N/T. 2 PIVs on RUE SL. RUE 4/5. All other extremities 5/5. VSS on 2L O2. Intermittent dizziness. Pt had PRN dilaudid for surgical pain. Continuous pulse oximetry. Passing gas. Pt talked to brother on phone. Not OOB yet. Plan for MRI tomorrow. Continue with plan of care.

## 2020-12-19 NOTE — PROGRESS NOTES
[facility], [place]   12/19/2020  Neurosurgery Progress Note:    Assessment:  Chari Coates is a 45 year old female who is now s/p right craniotomy for meningioma resection on 12/16.    Plan:  - Serial neuro exams  - 10 day dex taper  - MRI today  - Pain control  - HOB > 30 degrees  - Advance diet as tolerated  - Bowel regimen  - PRN antiemetics  - IVF until taking adequate PO  - PT/OT  - SCDs for DVT proph  - Likely discharge after MRI    -----------------------------------  Berkley Paul MD  Neurosurgery Resident, PGY-2    Please contact neurosurgery resident on call with questions.    Dial * * [phone], enter [phone] when prompted.   -----------------------------------    Interval History: MRI was not obtained    Objective:   Temp:  [98.3  F (36.8  C)-99.6  F (37.6  C)] (P) 99.6  F (37.6  C)  Pulse:  [101-108] (P) 105  Resp:  [14-16] (P) 16  BP: (119-143)/(61-78) (P) 120/77  SpO2:  [93 %-96 %] 93 %  I/O last 3 completed shifts:  In: 480 [P.O.:480]  Out: 1050 [Urine:1050]    Gen: Appears comfortable, NAD  Wound: clean, dry, intact  Neurologic:  -- Awake; Alert; oriented x 3  -- Follows commands briskly  -- Speech fluent, spontaneous. No aphasia or dysarthria.  -- no gaze preference. No apparent hemineglect.  Cranial Nerves:  -- Left visual field cut, PERRL 3-2mm bilat and brisk, extraocular movements intact  -- face symmetrical, tongue midline  -- palate elevates symmetrically, uvula midline  -- hearing grossly intact bilat  -- Trapezii 5/5 strength bilat symmetric     Motor:  Normal bulk / tone; no tremor, rigidity, or bradykinesia.  No muscle wasting or fasciculations  No Pronator Drift       Delt Bi Tri Hand Flexion/  Extension Iliopsoas Quadriceps Hamstrings Tibialis Anterior Gastroc     C5 C6 C7 C8/T1 L2 L3 L4-S1 L4 S1   R 5 5 5 5 5 5 5 5 5   L 5 5 5 5 5 5 5 5 5   Sensory:  intact to LT x 4 extremities      Reflexes:       Bi BR Tian Pat Ach Bab     C5-6 C6 UMN L2-4 S1 UMN   R 2+  2+ Norm 2+ 2+ Norm   L 2+ 2+ Norm 2+ 2+ Norm      Gait: Deferred    LABS:  Recent Labs   Lab 12/19/20  0659 12/18/20  0626 12/17/20  0600    143 140   POTASSIUM 4.0 4.1 4.0   CHLORIDE 111* 114* 112*   CO2 24 22 25   ANIONGAP 6 7 4   * 137* 130*   BUN 12 10 12   CR 0.62 0.62 0.72   AUREA 8.7 8.5 8.3*       Recent Labs   Lab 12/19/20  0659   WBC 12.1*   RBC 3.71*   HGB 11.3*   HCT 34.4*   MCV 93   MCH 30.5   MCHC 32.8   RDW 12.8          IMAGING:  No results found for this or any previous visit (from the past 24 hour(s)).

## 2020-12-19 NOTE — PLAN OF CARE
Status: POD 2 posterior craniotomy with tumor resection  Vitals: VSS on RA  Neuros: A&Ox4, strengths 5/5. Occasional, slight dizziness with standing, intermittent nausea  IV: SL x1 in place.  Diet: Regular, fair po intake today  Bowel status: BS+ no BM   : Voids spontaneously  Skin: head dressing from crani, neck dressing from old central line.  Pain: Incisional pain. Tylenol and Oxy 5mg for headache  Activity: A1-2 to BSC  Plan: MRI this sam if anti-anxiety meds are ordered and space still open. Continue to monitor POC

## 2020-12-19 NOTE — PLAN OF CARE
Status: POD 3 posterior craniotomy with meningioma resection  Vitals: VSS on RA  Neuros: A&Ox4, strengths 5/5. Occasional, slight dizziness with standing, intermittent nausea.  IV: SL x1 in place.  Labs: triggered sepsis protocol overnight d/t elevated WBC, LA 1.6. Phos redraw 0200 1.6.  Diet: Regular, low appetite  Bowel status: Multiple BM overnight  : Voids spontaneously  Skin: head dressing from crani, neck dressing from old midline.  Pain: Incisional pain. Tylenol and Oxy 5mg for headache. Benadryl frequently d/t multiple known drug reactions  Activity: Up with SBA  Plan: Possible discharge after MRI

## 2020-12-20 NOTE — PLAN OF CARE
Status: POD 3 posterior craniotomy with tumor resection  Vitals: VSS on RA  Neuros: A&Ox4, strengths 5/5.  IV: PIV x1 removed prior to discharge.  Diet: Regular, poor po intake today  Bowel status: BS+ no BM. LBM 12/18  : Voids spontaneously. Home with abx for UTI found yesterday.  Skin: Posterior head incision CDI with sutures.  Pain: Denies pain.   Activity: SBA, worked with PT this afternoon.  Plan: Continue to monitor POC. Pt discharged to local hot with her brother. Will travel home to [place] tomorrow. Left 6A via wheelchair @ 6478 with RN to  meds from pharmacy. Discharge instructions reviewed with pt and brother. Reports understanding of follow up POC.

## 2020-12-20 NOTE — PROGRESS NOTES
"6A PT Eval     12/20/20 1200   Quick Adds   Type of Visit Initial PT Evaluation   Living Environment   People in home alone   Current Living Arrangements house   Home Accessibility stairs to enter home;stairs within home   Number of Stairs, Main Entrance 3   Stair Railings, Main Entrance railings safe and in good condition   Number of Stairs, Within Home, Primary 7   Stair Railings, Within Home, Primary railings safe and in good condition   Transportation Anticipated family or friend will provide   Living Environment Comments Patient lives in split level home that has been modified for safety including colored tape to enhance vision. Patient has support from father for getting to apts/etc   Self-Care   Usual Activity Tolerance good   Current Activity Tolerance moderate   Regular Exercise No   Equipment Currently Used at Home grab bar, tub/shower;shower chair   Activity/Exercise/Self-Care Comment IND at baseline   Disability/Function   Hearing Difficulty or Deaf no   Wear Glasses or Blind yes   Vision Management glasses   Concentrating, Remembering or Making Decisions Difficulty no   Difficulty Communicating no   Difficulty Eating/Swallowing no   Walking or Climbing Stairs Difficulty no   Toileting issues no   Doing Errands Independently Difficulty (such as shopping) no   Fall history within last six months no   General Information   Onset of Illness/Injury or Date of Surgery 12/16/20   Referring Physician Briana Boyer APRN CNP   Patient/Family Therapy Goals Statement (PT) To return home   Pertinent History of Current Problem (include personal factors and/or comorbidities that impact the POC) Per EMR \"Chari Coates is a 45 year old female who is now s/p right craniotomy for meningioma resection on 12/16\"   General Observations activity: up with assist   Cognition   Affect/Mental Status (Cognition) WFL   Follows Commands (Cognition) WFL   Pain Assessment   Patient Currently in Pain No  (baseline dull " headache)   Posture    Posture Forward head position;Protracted shoulders   Range of Motion (ROM)   ROM Quick Adds ROM WFL   Strength   Manual Muscle Testing Quick Adds Strength WFL   Bed Mobility   Bed Mobility supine-sit   Supine-Sit [place] (Bed Mobility) independent   Transfers   Transfers sit-stand transfer   Sit-Stand Transfer   Sit-Stand [place] (Transfers) independent   Gait/Stairs (Locomotion)   [place] Level (Gait) supervision   Comment (Gait/Stairs) IND for long distance ambulation in open space but supervision for navigating unfamiliar spaces due to vision impairements   Balance   Balance Comments IND static natural stance, CGA for modified tandem with eyes close but able to recover appropriately with LOB   Clinical Impression   Criteria for Skilled Therapeutic Intervention yes, treatment indicated   PT Diagnosis (PT) impaired functional mobility   Influenced by the following impairments impaired balance, decreased endurance   Functional limitations due to impairments ambulation, stairs   Clinical Presentation Stable/Uncomplicated   Clinical Presentation Rationale clinical judgement   Clinical Decision Making (Complexity) low complexity   Therapy Frequency (PT) One time eval and treatment only   Predicted Duration of Therapy Intervention (days/wks) 1 session   Planned Therapy Interventions (PT) balance training;strengthening;gait training   Risk & Benefits of therapy have been explained evaluation/treatment results reviewed;care plan/treatment goals reviewed;patient   PT Discharge Planning    PT Discharge Recommendation (DC Rec) home with outpatient physical therapy   PT Rationale for DC Rec Patient will be appropriate to return home from PT perspective when medically stable and will benefit from OP PT for endurance and high level balance training.    PT Brief overview of current status  supervision for hallway ambulation    Total Evaluation Time   Total Evaluation Time (Minutes) 10        Jamaal Mcgowan, PT, DPT  Pager #[phone]

## 2020-12-20 NOTE — PLAN OF CARE
Physical Therapy Discharge Summary    Reason for therapy discharge:    All goals and outcomes met, no further needs identified.    Progress towards therapy goal(s). See goals on Care Plan in [facility] electronic health record for goal details.  Goals met    Therapy recommendation(s):    Continued therapy is recommended.  Rationale/Recommendations:  Patient will benefit from continued PT in outpatient setting for endurance training and high level balance.

## 2020-12-20 NOTE — PLAN OF CARE
Status: POD 3 posterior craniotomy with tumor resection  Vitals: VSS on RA  Neuros: A&Ox4, strengths 5/5. Occasional, slight dizziness with standing, better today. Sleepy this afternoon after 2 doses of Ativan IV for MRI. Arouses easily.  IV: New SL x1 in place.  Diet: Regular, poor po intake today  Bowel status: BS+ no BM. LBM 12/18  : Voids spontaneously. Appears to have UTI- N.S aware and awaiting possible order for abx.  Skin: Posterior head dressing removed by pt this am. Incision CDI with sutures. B cheeks reddened after Effexor- per pt this is baseline and the reason she takes Benadryl at home also.  Pain: Denies pain, received scheduled Tylenol  Activity: A1   Plan: Continue to monitor POC. MRI completed this afternoon, awaiting results and POC.

## 2020-12-20 NOTE — PLAN OF CARE
Status: POD 4 posterior craniotomy with meningioma resection  Vitals: VSS on RA  Neuros: A&Ox4, strengths 5/5. Occasional, slight dizziness with standing, intermittent nausea.  IV: SL x1 in place.  Labs: WNL this shift  Diet: Regular, low appetite  Bowel status: Bowel meds held overnight d/t multiple loose stools yesterday.  : Voids spontaneously  Skin: head dressing from crani, neck dressing from old midline.  Pain: Incisional pain. Tylenol and Oxy 5mg for headache. Benadryl frequently d/t multiple known drug reactions  Activity: Up with SBA  Plan: discharge to home tomorrow with 5 day course of antibiotics for UTI

## 2020-12-21 NOTE — DISCHARGE SUMMARY
[facility] Discharge Summary and Instructions    Chari Coates MRN# [id number]   Age: 45 year old YOB: 1975     Date of Admission:  12/16/2020  Date of Discharge::  12/20/2020  3:00 PM  Admitting Physician:  Ángel Lau MD  Discharge Physician:  Ángel Lau MD          Admission Diagnoses:   Brain Tumor  Brain mass          Discharge Diagnosis:   Brain Tumor  Brain mass          Procedures:   12/16/020: R Craniotomy for meningioma resection           Brief History of Illness:   Chari Coates is a 45 year old female with a past medical history of ALL s/p chemo and radiation when she was a child.  She subsequently developed multiple meningiomas s/p resection with Dr. Lau and Lenin.  Starting on Thursday, she noted blurry vision and so she talk to her doctor who recommended she present to the emergency department.  She went to the emergency department in [place] where they obtained imaging which showed interval increase in her known meningioma.  Her visual symptoms have since resolved, but she was transferred to [facility] for resection of the growing meningioma.  She currently feels at her baseline.          Hospital Course:   Patient underwent above-mentioned procedure on 12/16. The operation was uncomplicated and he/she was admitted to the surgical ICU for routine post operative cares. She was began on a 10 day dex taper. On post operative day 1 she was doing well and transferred to the floor. On post operative day 2, he/she was ambulating, voiding without a gu, eating a regular diet, pain was well controlled. She was found to have a UTI and started on macrobid. She received her MRI on 12/19. And was uneventfully discharged on 12/20    Exam on discharge:       Gen: Appears comfortable, NAD  Wound: clean, dry, intact  Neurologic:  -- Awake; Alert; oriented x 3  -- Follows commands briskly  -- Speech fluent, spontaneous. No aphasia or dysarthria.  -- no gaze preference.  No apparent hemineglect.  Cranial Nerves:  -- Left visual field cut, PERRL 3-2mm bilat and brisk, extraocular movements intact  -- face symmetrical, tongue midline  -- palate elevates symmetrically, uvula midline  -- hearing grossly intact bilat  -- Trapezii 5/5 strength bilat symmetric     Motor:  Normal bulk / tone; no tremor, rigidity, or bradykinesia.  No muscle wasting or fasciculations  No Pronator Drift       Delt Bi Tri Hand Flexion/  Extension Iliopsoas Quadriceps Hamstrings Tibialis Anterior Gastroc     C5 C6 C7 C8/T1 L2 L3 L4-S1 L4 S1   R 5 5 5 5 5 5 5 5 5   L 5 5 5 5 5 5 5 5 5   Sensory:  intact to LT x 4 extremities      Reflexes:       Bi BR Tian Pat Ach Bab     C5-6 C6 UMN L2-4 S1 UMN   R 2+ 2+ Norm 2+ 2+ Norm   L 2+ 2+ Norm 2+ 2+ Norm              Discharge Medications:     Discharge Medication List as of 12/20/2020  1:52 PM      START taking these medications    Details   dexamethasone (DECADRON) 2 MG tablet Take 2 tablets (4 mg) by mouth every 6 hours for 2 days, THEN 1.5 tablets (3 mg) every 6 hours for 4 days, THEN 1 tablet (2 mg) every 6 hours for 4 days, THEN 0.5 tablets (1 mg) every 6 hours for 4 days., Disp-64 tablet, R-0, E-PrescribeYou will take 4m g for 2 days; 3mg for 4 days, 2mg for 4 days, and 1mg for 4 days      !! gabapentin (NEURONTIN) 100 MG capsule Take 1 capsule (100 mg) by mouth 2 times daily for 28 days, Disp-56 capsule, R-0, E-Prescribe      nitroFURantoin macrocrystal-monohydrate (MACROBID) 100 MG capsule Take 1 capsule (100 mg) by mouth every 12 hours for 4 days, Disp-8 capsule, R-0, E-Prescribe      polyethylene glycol (MIRALAX) 17 GM/Dose powder Take 17 g by mouth daily for 14 days, Disp-510 g, R-0, E-PrescribeTake this when you take your opioids      SENNA-docusate sodium (SENNA S) 8.6-50 MG tablet Take 1 tablet by mouth At Bedtime for 14 days, Disp-14 tablet, R-0, E-Prescribe       !! - Potential duplicate medications found. Please discuss with provider.      CONTINUE  these medications which have CHANGED    Details   !! gabapentin (NEURONTIN) 300 MG capsule Take 1 capsule (300 mg) by mouth At Bedtime for 28 days, Disp-28 capsule, R-0, E-Prescribe      methocarbamol (ROBAXIN) 500 MG tablet Take 1 tablet (500 mg) by mouth 4 times daily as needed for muscle spasms, Disp-28 tablet, R-0, E-Prescribe       !! - Potential duplicate medications found. Please discuss with provider.      CONTINUE these medications which have NOT CHANGED    Details   baclofen (LIORESAL) 10 MG tablet Take 10 mg by mouth 2 times daily, Historical      omeprazole (PRILOSEC) 20 MG DR capsule Take 20 mg by mouth daily, Historical      !! venlafaxine (EFFEXOR-XR) 37.5 MG 24 hr capsule Take 37.5 mg by mouth daily Take with 75mg ER capsule for total daily dose of 112.5mg, Historical      albuterol (PROAIR HFA/PROVENTIL HFA/VENTOLIN HFA) 108 (90 BASE) MCG/ACT Inhaler Inhale 2 puffs into the lungs 2 times daily , Historical      Budesonide-Formoterol Fumarate (SYMBICORT IN) Inhale 2 puffs into the lungs 2 times daily , Historical      Cholecalciferol (VITAMIN D PO) Take 50,000 Units by mouth twice a week On Monday and Friday, Historical      diphenhydrAMINE (BENADRYL) 25 MG capsule Take by mouth as needed, Historical      !! gabapentin (NEURONTIN) 100 MG capsule Take 100mg in the morning, 100mg in the afternoon, and 300mg in the evening., Historical      medroxyPROGESTERone (DEPO-PROVERA) 150 MG/ML injection Inject 150 mg into the muscle every 3 months, Historical      !! venlafaxine (EFFEXOR-XR) 75 MG 24 hr capsule Take 75 mg by mouth every morning Take with 37.5mg ER capsule for total daily dose of 112.5mg daily, Historical       !! - Potential duplicate medications found. Please discuss with provider.                  Discharge Instructions and Follow-Up:   Discharge diet: Regular   Discharge activity: You may advance activity as tolerated. No strenuous exercise or heay lifting greater than 10 lbs for 4 weeks or  until seen and cleared in clinic.   Discharge follow-up: Follow-up with with PCP in 2 weeks for suture removal  Follow-up with Dr. Lau in 3 months with an MRI   Wound care: Ok to shower,however no scrubbing of the wound and no soaking of the wound, meaning no bathtubs or swimming pools. Pat dry only. Leave wound open to air.  Sutures are not absorbable and need to be removed in 2 weeks. If patient still at rehab by this time, the sutures may be removed by the rehab physician if he or she considers that the wound has healed completely.     Please call if you have:  1. increased pain, redness, drainage, swelling at your incision  2. fevers > 101.5 F degrees  3. with any questions or concerns.  You may reach the Neurosurgery clinic at [phone] during regular work hours. ER at [phone].    and ask for the Neurosurgery Resident on call at [phone], during off hours or weekends.         Discharge Disposition:   Discharged to home

## 2020-12-21 NOTE — PLAN OF CARE
Occupational Therapy Discharge Summary    Reason for therapy discharge:    Discharged to home.    Progress towards therapy goal(s). See goals on Care Plan in [facility] electronic health record for goal details.  Goals partially met.  Barriers to achieving goals:   discharge from facility.    Therapy recommendation(s):    No further therapy is recommended.

## 2020-12-22 NOTE — PROGRESS NOTES
Attempted to contact the patient x3 for post-hospital call without answer. Will close encounter at this time.    Amberly Funes CMA, [facility]  Post Hospital Discharge Team

## 2021-01-01 ENCOUNTER — TRANSFERRED RECORDS (OUTPATIENT)
Dept: HEALTH INFORMATION MANAGEMENT | Facility: CLINIC | Age: 46
End: 2021-01-01

## 2021-01-01 ENCOUNTER — TELEPHONE (OUTPATIENT)
Dept: NEUROSURGERY | Facility: CLINIC | Age: 46
End: 2021-01-01

## 2021-01-01 ENCOUNTER — VIRTUAL VISIT (OUTPATIENT)
Dept: NEUROSURGERY | Facility: CLINIC | Age: 46
End: 2021-01-01
Attending: NEUROLOGICAL SURGERY
Payer: MEDICARE

## 2021-01-01 ENCOUNTER — HEALTH MAINTENANCE LETTER (OUTPATIENT)
Age: 46
End: 2021-01-01

## 2021-01-01 ENCOUNTER — MEDICAL CORRESPONDENCE (OUTPATIENT)
Dept: HEALTH INFORMATION MANAGEMENT | Facility: CLINIC | Age: 46
End: 2021-01-01

## 2021-01-01 DIAGNOSIS — G96.198 PSEUDOMENINGOCELE: ICD-10-CM

## 2021-01-01 DIAGNOSIS — D32.9 MENINGIOMA (H): Primary | ICD-10-CM

## 2021-01-01 PROCEDURE — 99024 POSTOP FOLLOW-UP VISIT: CPT | Mod: 95 | Performed by: NEUROLOGICAL SURGERY

## 2021-01-01 PROCEDURE — 999N000103 HC STATISTIC NO CHARGE FACILITY FEE: Mod: TEL

## 2021-01-11 NOTE — TELEPHONE ENCOUNTER
[facility] Call Center    Phone Message    May a detailed message be left on voicemail: yes     Reason for Call: Other: Sandra, Charge Nurse, from [facility] in [facility], [place]:  Reporting that pt is currently in this hospital. Pt's condition is stable, pt came into hospital more fatigued and forgetful. Pt had surgery with Dr. Lau on 12/16/20. Pt was seen by neurosurgeon, Dr. Christian, and hospitalist, Dr. Boyle, at this hospital. Dr. Boyle is asking for this pt to have an appt with Dr. Lau within 1 week. Please call Sandra, at ph# [phone] (this goes to the nurse's station) and ask for Sandra, please, in order to help this pt with the appt with Dr. Lau. Thank you.    Action Taken: Message routed to:  [facility] ([facility]): [facility] Neurosurgery Scheduling    Travel Screening: Not Applicable

## 2021-01-11 NOTE — TELEPHONE ENCOUNTER
Health Call Center    Phone Message    May a detailed message be left on voicemail: yes     Reason for Call: Other: Glen calling to request a call back due to him noticing that Chari having some swelling on her incision. He stated that she had surgery on 12/16/20. He stated that it is puffy and feels watery. He is wondering what they should do to help with the swelling. Please call Glen at your earliest convenience to discuss.     Action Taken: Message routed to:  [facility] ([facility]): [facility] NEUROSURGERY    Travel Screening: Not Applicable

## 2021-01-12 NOTE — TELEPHONE ENCOUNTER
[facility] Call Center    Phone Message    May a detailed message be left on voicemail: yes     Reason for Call: Other: Sandra checking on the status for patient to be seen by Slim Molina the end of this week or early next. Please call to advise.    They do not want to discharge patient until she gets an appointment scheduled.    Action Taken: Other: [facility] [place]    Travel Screening: Not Applicable

## 2021-01-15 NOTE — TELEPHONE ENCOUNTER
LVM for patient/dfamily to schedule video visit with Dr. Lau on 1/26.    Will need imaging prior at [facility]. Patient may call [phone] to schedule imaging.  Orders have been faxed.    Roslyn Baer

## 2021-01-26 NOTE — PATIENT INSTRUCTIONS
You met with Pediatric Neurosurgery at the [facility]    JEN [address] Dr., NP      Pediatric Appointment Scheduling and Call Center:   [phone]    Nurse Practitioner  [phone]    Mailing Address  [address] [address]    Street Address   [address] [place] [address]    Fax Number  [phone]    For urgent matters that cannot wait until the next business day, occur over a holiday and/or weekend, report directly to your nearest ER or you may call [phone] and ask to page the Pediatric Neurosurgery Resident on call.

## 2021-01-26 NOTE — LETTER
1/26/2021      RE: Chari Coates  [address]  [facility] [id number]       Chari is a 45 year old who is being evaluated via a billable video visit.      How would you like to obtain your AVS? Mail a copy  If the video visit is dropped, the invitation should be resent by: Text to cell phone: [phone]  Will anyone else be joining your video visit? Yes: Brother. How would they like to receive their invitation? Send to e-mail at: [email or web address].    Gregoria Lopez LPN      It was a pleasure virtually meeting with Chari today.  A significant amount of discussion was also with her brother.  Chari underwent redo craniotomy for resection of occipital meningioma on December 16, 2020.  She has been doing well in general but was admitted locally recently for a subgaleal fluid collection which was consistent with pseudomeningocele.  There has been no leaking.  She has been doing well since and feels that the lesion has gotten smaller.  She has not been having significant headaches, gait problems, weakness, numbness or vision changes.  There is been no nausea or vomiting.  She is not having fevers, redness of the incision or leaking.  Again, her brother says that the lesion is softer to palpation and appears to be smaller in diameter.    No exam was done today.  This was a telephone visit.    On the most recent imaging that we have, this was consistent with a pseudomeningocele.  It was not present upon discharge but it sounds like it occurred several days after the were home.    Impression status post craniotomy for excision of meningioma, pseudomeningocele, doing well in general.    Plan we will obtain the most recent MRI image that was done locally that we do not have in our system right now for review.  We will see her back as scheduled in a few months and they will call if there are any concerns prior to that time.  We expect this to continue to decrease in size and if it does not, we discussed options which  could be used to treat it.    The duration of this telephone visit was 25 minutes.      Ángel Lau MD

## 2021-01-26 NOTE — PROGRESS NOTES
Chari is a 45 year old who is being evaluated via a billable video visit.      How would you like to obtain your AVS? Mail a copy  If the video visit is dropped, the invitation should be resent by: Text to cell phone: [phone]  Will anyone else be joining your video visit? Yes: Brother. How would they like to receive their invitation? Send to e-mail at: [email or web address].    Gregoria Lopez LPN

## 2021-01-26 NOTE — NURSING NOTE
Chief Complaint   Patient presents with     RECHECK     MRI results.      There were no vitals taken for this visit.  Gregoria Lopez LPN  January 26, 2021

## 2021-01-26 NOTE — LETTER
1/26/2021      RE: Chari Coates  [address]  [facility] [address]       Chari is a 45 year old who is being evaluated via a billable video visit.      How would you like to obtain your AVS? Mail a copy  If the video visit is dropped, the invitation should be resent by: Text to cell phone: [phone]  Will anyone else be joining your video visit? Yes: Brother. How would they like to receive their invitation? Send to e-mail at: [email or web address].    Gregoria Lopez LPN      It was a pleasure virtually meeting with Chari today.  A significant amount of discussion was also with her brother.  Chari underwent redo craniotomy for resection of occipital meningioma on December 16, 2020.  She has been doing well in general but was admitted locally recently for a subgaleal fluid collection which was consistent with pseudomeningocele.  There has been no leaking.  She has been doing well since and feels that the lesion has gotten smaller.  She has not been having significant headaches, gait problems, weakness, numbness or vision changes.  There is been no nausea or vomiting.  She is not having fevers, redness of the incision or leaking.  Again, her brother says that the lesion is softer to palpation and appears to be smaller in diameter.    No exam was done today.  This was a telephone visit.    On the most recent imaging that we have, this was consistent with a pseudomeningocele.  It was not present upon discharge but it sounds like it occurred several days after the were home.    Impression status post craniotomy for excision of meningioma, pseudomeningocele, doing well in general.    Plan we will obtain the most recent MRI image that was done locally that we do not have in our system right now for review.  We will see her back as scheduled in a few months and they will call if there are any concerns prior to that time.  We expect this to continue to decrease in size and if it does not, we discussed options which  could be used to treat it.    The duration of this telephone visit was 25 minutes.      Ángel Lau MD

## 2021-01-28 NOTE — TELEPHONE ENCOUNTER
Action 1.28.21 9:59AM MJ   Action Taken Requested MRI Brain from [facility].  MRI Brain 1.23.21      RECORDS RECEIVED FROM: Internal- 3 month follow up appt   Date of Appt: 3.16.21   NOTES (FOR ALL VISITS) STATUS DETAILS   OFFICE NOTE from referring provider N/A    OFFICE NOTE from other specialist Internal/care everywhere 1.26.21 Sid [facility]  9.17.20 LINDA Christian   DISCHARGE SUMMARY from hospital Care Everywhere 12.10.20 Dr. Dawkins, [facility]   DISCHARGE REPORT from the ER N/A    OPERATIVE REPORT Internal 12.16.20 Dr. Arteaga, [facility]   MEDICATION LIST Internal    IMAGING  (FOR ALL VISITS)     EMG N/A    EEG N/A    LUMBAR PUNCTURE N/A    AMMY SCAN N/A    DEXA SCAN *NEUROSURGERY* N/A    ULTRASOUND (CAROTID BILAT) *VASCULAR* N/A    MRI (HEAD, NECK, SPINE) Internal/ care everywhere 12.19.20 brain   12.16.20 Brain  12.16.20 Brain  12.20.20 brain [facility]   XRAY (SPINE) *NEUROSURGERY* N/A    CT (HEAD, NECK, SPINE) Care Everywhere 1.11.21 head, [facility]

## 2021-02-04 NOTE — PROGRESS NOTES
It was a pleasure virtually meeting with Chari today.  A significant amount of discussion was also with her brother.  Chari underwent redo craniotomy for resection of occipital meningioma on December 16, 2020.  She has been doing well in general but was admitted locally recently for a subgaleal fluid collection which was consistent with pseudomeningocele.  There has been no leaking.  She has been doing well since and feels that the lesion has gotten smaller.  She has not been having significant headaches, gait problems, weakness, numbness or vision changes.  There is been no nausea or vomiting.  She is not having fevers, redness of the incision or leaking.  Again, her brother says that the lesion is softer to palpation and appears to be smaller in diameter.    No exam was done today.  This was a telephone visit.    On the most recent imaging that we have, this was consistent with a pseudomeningocele.  It was not present upon discharge but it sounds like it occurred several days after the were home.    Impression status post craniotomy for excision of meningioma, pseudomeningocele, doing well in general.    Plan we will obtain the most recent MRI image that was done locally that we do not have in our system right now for review.  We will see her back as scheduled in a few months and they will call if there are any concerns prior to that time.  We expect this to continue to decrease in size and if it does not, we discussed options which could be used to treat it.    The duration of this telephone visit was 25 minutes.

## 2021-03-16 ENCOUNTER — PRE VISIT (OUTPATIENT)
Dept: NEUROSURGERY | Facility: CLINIC | Age: 46
End: 2021-03-16

## 2024-06-17 PROBLEM — Z71.89 ADVANCED DIRECTIVES, COUNSELING/DISCUSSION: Status: RESOLVED | Noted: 2017-07-27 | Resolved: 2024-06-17

## (undated) DEVICE — SPONGE COTTON BALL 1" W/STRING 30-034

## (undated) DEVICE — SU NUROLON 4-0 TF CR 8X18" C584D

## (undated) DEVICE — BUR STRK 2.3MM TAPERED ROUTER - FA2 5407-FA2-023

## (undated) DEVICE — SPONGE COTTON BALL 1/2" W/STRING 30-02

## (undated) DEVICE — CUSA 36KHZ TIP STD CRD EXT

## (undated) DEVICE — MARKER SPHERES PASSIVE MEDT PACK 5 8801075

## (undated) DEVICE — PACK GOWN 3/PK DISP XL SBA32GPFCB

## (undated) DEVICE — SPONGE COTTONOID 1/2X3" 20-07S

## (undated) DEVICE — CUSA 36KHZ WRENCH

## (undated) DEVICE — CUSA 36KHZ TIP CVD EXT MICROTIP CLARITY C74115

## (undated) DEVICE — SOL NACL 0.9% IRRIG 1000ML BOTTLE 2F7124

## (undated) DEVICE — SPONGE SURGIFOAM 100 1974

## (undated) DEVICE — ESU ELEC BLADE 2.75" COATED/INSULATED E1455

## (undated) DEVICE — DRAPE CRANIOTOMY W/POUCH 9450

## (undated) DEVICE — SUCTION MANIFOLD DORNOCH ULTRA CART UL-CL500

## (undated) DEVICE — SYR 30ML LL W/O NDL 302832

## (undated) DEVICE — TUBING SET ASPIRATION W/EXT SONOPET  LF 5450-850-003

## (undated) DEVICE — DRAPE SHEET MED 44X70" 9355

## (undated) DEVICE — SPONGE COTTONOID 1/4X1/4" 20-01S

## (undated) DEVICE — WIPES FOLEY CARE SURESTEP PROVON DFC100

## (undated) DEVICE — Device

## (undated) DEVICE — PAD CHUX UNDERPAD 23X24" 7136

## (undated) DEVICE — DRSG TEGADERM 2 3/8X2 3/4" 1624W

## (undated) DEVICE — PIN SKULL MAYFIELD ADULT TITANIUM 3/PK A1120

## (undated) DEVICE — SPONGE COTTONOID 1/2X1/2" 20-04S

## (undated) DEVICE — CATH TRAY FOLEY SURESTEP 16FR W/TMP PRB STLK LATEX A319416AM

## (undated) DEVICE — SU ETHILON 3-0 PS-1 18" 1663H

## (undated) DEVICE — SU VICRYL 2-0 CT-2 CR 8X18" J726D

## (undated) DEVICE — DRSG PRIMAPORE 03 1/8X6" 66000318

## (undated) DEVICE — GLOVE PROTEXIS BLUE W/NEU-THERA 8.5  2D73EB85

## (undated) DEVICE — DECANTER VIAL 2006S

## (undated) DEVICE — CUSA 36KHZ WRENCH TORQUE CLARITY C7602

## (undated) DEVICE — PERFORATOR 14MM CODMAN

## (undated) DEVICE — SPONGE COTTONOID 1/2X1 1/2" 20-06S

## (undated) DEVICE — SYR 10ML FINGER CONTROL W/O NDL 309695

## (undated) DEVICE — CRANIOTOME ADULT ANSPACH A-CRN

## (undated) DEVICE — STRAP UNIVERSAL POSITIONING 2-PIECE 4X47X76" 91-287

## (undated) DEVICE — ADH FLOSEAL W/HUMAN THROMBIN 5ML 1503350

## (undated) DEVICE — CUSA TUBE QUICK CONNECT CLARITY C7300

## (undated) DEVICE — SOL WATER IRRIG 1000ML BOTTLE 2F7114

## (undated) DEVICE — PREP SKIN SCRUB TRAY 4461A

## (undated) DEVICE — SPONGE COTTON BALL 1/4" W/STRING 30-00

## (undated) DEVICE — SPONGE COTTONOID 1X3" 20-10S

## (undated) DEVICE — TIP NAVIGATOR STR MICRO SONOPET 25KHZ 5450-800-309

## (undated) DEVICE — LINEN TOWEL PACK X30 5481

## (undated) DEVICE — ESU GROUND PAD ADULT W/CORD E7507

## (undated) DEVICE — NDL ANGIOCATH 14GA 1.25" 4048

## (undated) DEVICE — DECANTER BAG 2002S

## (undated) DEVICE — LIGHT HANDLE X1 31140133

## (undated) DEVICE — SYR 01ML 27GA 0.5" NDL TBC 309623

## (undated) DEVICE — PREP POVIDONE IODINE SOLUTION 10% 4OZ

## (undated) DEVICE — TUBING IV 69" STERILE 1C8160S

## (undated) DEVICE — PREP POVIDONE IODINE SCRUB 7.5% 4OZ APL82212

## (undated) DEVICE — LINEN TOWEL PACK X6 WHITE 5487

## (undated) DEVICE — BUR BALL 5MM CARBIDE ANSPACH S-5B-C-G1

## (undated) DEVICE — ESU CORD BIPOLAR AND IRR TUBING AESCULAP US355

## (undated) DEVICE — PREP POVIDONE IODINE SOLUTION 10% 120ML

## (undated) DEVICE — IMP SCR SYN MATRIX LOW PRO 1.5X04MM SELF DRILL 04.503.104.01: Type: IMPLANTABLE DEVICE | Site: SKULL | Status: NON-FUNCTIONAL

## (undated) DEVICE — PREP POVIDONE IODINE SCRUB 7.5% 120ML

## (undated) DEVICE — BUR ROUTER 1.4X12.8MM ANSPACH S-1R

## (undated) DEVICE — DRAPE MICROSCOPE LEICA 54X150" AR8033650

## (undated) DEVICE — SYR 30ML LL W/O NDL

## (undated) DEVICE — NDL BLUNT 17GA 1.5" 8881202330

## (undated) DEVICE — CATH TRAY FOLEY SURESTEP 16FR W/URNE MTR STLK LATEX A303316A

## (undated) DEVICE — GLOVE PROTEXIS MICRO 8.0  2D73PM80

## (undated) DEVICE — DRAPE POUCH INSTRUMENT 1018

## (undated) DEVICE — PREP CHLORAPREP CLEAR 3ML 260400

## (undated) DEVICE — SURGICEL HEMOSTAT 4X8" 1952

## (undated) DEVICE — PACK CRANIOTOMY

## (undated) DEVICE — TUBING CUSA MANIFOLD 36KHZ

## (undated) DEVICE — SOL RINGERS LACTATED 1000ML BAG 2B2324X

## (undated) DEVICE — COVER CAMERA VIDEO LASER 9X96" 04-CC227

## (undated) RX ORDER — PROPOFOL 10 MG/ML
INJECTION, EMULSION INTRAVENOUS
Status: DISPENSED
Start: 2020-01-01

## (undated) RX ORDER — LABETALOL HYDROCHLORIDE 5 MG/ML
INJECTION, SOLUTION INTRAVENOUS
Status: DISPENSED
Start: 2020-01-01

## (undated) RX ORDER — FENTANYL CITRATE 50 UG/ML
INJECTION, SOLUTION INTRAMUSCULAR; INTRAVENOUS
Status: DISPENSED
Start: 2020-01-01

## (undated) RX ORDER — SODIUM CHLORIDE 9 MG/ML
INJECTION, SOLUTION INTRAVENOUS
Status: DISPENSED
Start: 2020-01-01

## (undated) RX ORDER — HYDROMORPHONE HYDROCHLORIDE 1 MG/ML
INJECTION, SOLUTION INTRAMUSCULAR; INTRAVENOUS; SUBCUTANEOUS
Status: DISPENSED
Start: 2020-01-01

## (undated) RX ORDER — DEXAMETHASONE SODIUM PHOSPHATE 4 MG/ML
INJECTION, SOLUTION INTRA-ARTICULAR; INTRALESIONAL; INTRAMUSCULAR; INTRAVENOUS; SOFT TISSUE
Status: DISPENSED
Start: 2020-01-01

## (undated) RX ORDER — DIPHENHYDRAMINE HYDROCHLORIDE 50 MG/ML
INJECTION INTRAMUSCULAR; INTRAVENOUS
Status: DISPENSED
Start: 2020-01-01

## (undated) RX ORDER — LIDOCAINE HYDROCHLORIDE 20 MG/ML
INJECTION, SOLUTION EPIDURAL; INFILTRATION; INTRACAUDAL; PERINEURAL
Status: DISPENSED
Start: 2020-01-01

## (undated) RX ORDER — ONDANSETRON 2 MG/ML
INJECTION INTRAMUSCULAR; INTRAVENOUS
Status: DISPENSED
Start: 2017-03-30

## (undated) RX ORDER — FENTANYL CITRATE-0.9 % NACL/PF 10 MCG/ML
PLASTIC BAG, INJECTION (ML) INTRAVENOUS
Status: DISPENSED
Start: 2020-01-01

## (undated) RX ORDER — DIPHENHYDRAMINE HCL 25 MG
CAPSULE ORAL
Status: DISPENSED
Start: 2017-03-30

## (undated) RX ORDER — FENTANYL CITRATE 50 UG/ML
INJECTION, SOLUTION INTRAMUSCULAR; INTRAVENOUS
Status: DISPENSED
Start: 2017-03-30

## (undated) RX ORDER — BACITRACIN 50000 [IU]/1
INJECTION, POWDER, FOR SOLUTION INTRAMUSCULAR
Status: DISPENSED
Start: 2017-03-30

## (undated) RX ORDER — CLINDAMYCIN PHOSPHATE 900 MG/50ML
INJECTION, SOLUTION INTRAVENOUS
Status: DISPENSED
Start: 2017-03-30

## (undated) RX ORDER — ONDANSETRON 2 MG/ML
INJECTION INTRAMUSCULAR; INTRAVENOUS
Status: DISPENSED
Start: 2020-01-01

## (undated) RX ORDER — KETOROLAC TROMETHAMINE 30 MG/ML
INJECTION, SOLUTION INTRAMUSCULAR; INTRAVENOUS
Status: DISPENSED
Start: 2020-01-01

## (undated) RX ORDER — BUPIVACAINE HYDROCHLORIDE AND EPINEPHRINE 2.5; 5 MG/ML; UG/ML
INJECTION, SOLUTION EPIDURAL; INFILTRATION; INTRACAUDAL; PERINEURAL
Status: DISPENSED
Start: 2017-03-30

## (undated) RX ORDER — EPHEDRINE SULFATE 50 MG/ML
INJECTION, SOLUTION INTRAMUSCULAR; INTRAVENOUS; SUBCUTANEOUS
Status: DISPENSED
Start: 2020-01-01

## (undated) RX ORDER — GLYCOPYRROLATE 0.2 MG/ML
INJECTION, SOLUTION INTRAMUSCULAR; INTRAVENOUS
Status: DISPENSED
Start: 2020-01-01

## (undated) RX ORDER — CALCIUM CHLORIDE 100 MG/ML
INJECTION INTRAVENOUS; INTRAVENTRICULAR
Status: DISPENSED
Start: 2020-01-01

## (undated) RX ORDER — ESMOLOL HYDROCHLORIDE 10 MG/ML
INJECTION INTRAVENOUS
Status: DISPENSED
Start: 2020-01-01

## (undated) RX ORDER — SODIUM CHLORIDE 9 MG/ML
INJECTION, SOLUTION INTRAVENOUS
Status: DISPENSED
Start: 2017-03-30

## (undated) RX ORDER — CEFAZOLIN SODIUM 1 G/3ML
INJECTION, POWDER, FOR SOLUTION INTRAMUSCULAR; INTRAVENOUS
Status: DISPENSED
Start: 2020-01-01

## (undated) RX ORDER — HYDRALAZINE HYDROCHLORIDE 20 MG/ML
INJECTION INTRAMUSCULAR; INTRAVENOUS
Status: DISPENSED
Start: 2020-01-01

## (undated) RX ORDER — BUPIVACAINE HYDROCHLORIDE AND EPINEPHRINE 5; 5 MG/ML; UG/ML
INJECTION, SOLUTION EPIDURAL; INTRACAUDAL; PERINEURAL
Status: DISPENSED
Start: 2020-01-01

## (undated) RX ORDER — ETOMIDATE 2 MG/ML
INJECTION INTRAVENOUS
Status: DISPENSED
Start: 2020-01-01